# Patient Record
Sex: FEMALE | Race: WHITE | NOT HISPANIC OR LATINO | Employment: FULL TIME | URBAN - METROPOLITAN AREA
[De-identification: names, ages, dates, MRNs, and addresses within clinical notes are randomized per-mention and may not be internally consistent; named-entity substitution may affect disease eponyms.]

---

## 2017-07-11 ENCOUNTER — TELEPHONE (OUTPATIENT)
Dept: OBSTETRICS AND GYNECOLOGY | Facility: CLINIC | Age: 52
End: 2017-07-11

## 2017-07-11 NOTE — TELEPHONE ENCOUNTER
Pt said she thinks it is time for her yearly mammo. She had one done in November but that was for breast andrzej so she would like to know if  wants her to get a regular screening mammo done now.

## 2017-09-22 RX ORDER — ESTRADIOL 0.5 MG/1
TABLET ORAL
Qty: 30 TABLET | Refills: 0 | Status: SHIPPED | OUTPATIENT
Start: 2017-09-22 | End: 2017-10-24 | Stop reason: SDUPTHER

## 2017-10-24 RX ORDER — ESTRADIOL 0.5 MG/1
TABLET ORAL
Qty: 30 TABLET | Refills: 0 | Status: SHIPPED | OUTPATIENT
Start: 2017-10-24 | End: 2017-11-27 | Stop reason: SDUPTHER

## 2017-11-28 RX ORDER — ESTRADIOL 0.5 MG/1
TABLET ORAL
Qty: 30 TABLET | Refills: 0 | Status: SHIPPED | OUTPATIENT
Start: 2017-11-28 | End: 2018-01-30

## 2017-11-28 RX ORDER — ESTRADIOL 0.5 MG/1
TABLET ORAL
Qty: 30 TABLET | Refills: 0 | Status: SHIPPED | OUTPATIENT
Start: 2017-11-28 | End: 2017-11-28 | Stop reason: SDUPTHER

## 2017-11-30 ENCOUNTER — TELEPHONE (OUTPATIENT)
Dept: OBSTETRICS AND GYNECOLOGY | Facility: CLINIC | Age: 52
End: 2017-11-30

## 2017-11-30 DIAGNOSIS — Z12.31 VISIT FOR SCREENING MAMMOGRAM: Primary | ICD-10-CM

## 2017-12-14 ENCOUNTER — TELEPHONE (OUTPATIENT)
Dept: OBSTETRICS AND GYNECOLOGY | Facility: CLINIC | Age: 52
End: 2017-12-14

## 2017-12-14 DIAGNOSIS — Z12.31 VISIT FOR SCREENING MAMMOGRAM: Primary | ICD-10-CM

## 2017-12-27 RX ORDER — ESTRADIOL 0.5 MG/1
TABLET ORAL
Qty: 30 TABLET | Refills: 0 | Status: SHIPPED | OUTPATIENT
Start: 2017-12-27 | End: 2018-01-30

## 2018-01-30 ENCOUNTER — OFFICE VISIT (OUTPATIENT)
Dept: OBSTETRICS AND GYNECOLOGY | Facility: CLINIC | Age: 53
End: 2018-01-30
Payer: COMMERCIAL

## 2018-01-30 VITALS
DIASTOLIC BLOOD PRESSURE: 78 MMHG | SYSTOLIC BLOOD PRESSURE: 126 MMHG | WEIGHT: 145.5 LBS | BODY MASS INDEX: 26.78 KG/M2 | HEIGHT: 62 IN

## 2018-01-30 DIAGNOSIS — Z11.51 ENCOUNTER FOR SCREENING FOR HUMAN PAPILLOMAVIRUS (HPV): ICD-10-CM

## 2018-01-30 DIAGNOSIS — Z12.4 ENCOUNTER FOR PAPANICOLAOU SMEAR FOR CERVICAL CANCER SCREENING: ICD-10-CM

## 2018-01-30 DIAGNOSIS — Z01.419 ENCOUNTER FOR GYNECOLOGICAL EXAMINATION (GENERAL) (ROUTINE) WITHOUT ABNORMAL FINDINGS: Primary | ICD-10-CM

## 2018-01-30 PROCEDURE — 87624 HPV HI-RISK TYP POOLED RSLT: CPT

## 2018-01-30 PROCEDURE — 99396 PREV VISIT EST AGE 40-64: CPT | Mod: S$GLB,,, | Performed by: OBSTETRICS & GYNECOLOGY

## 2018-01-30 PROCEDURE — 88175 CYTOPATH C/V AUTO FLUID REDO: CPT

## 2018-01-30 PROCEDURE — 99999 PR PBB SHADOW E&M-EST. PATIENT-LVL III: CPT | Mod: PBBFAC,,, | Performed by: OBSTETRICS & GYNECOLOGY

## 2018-02-02 LAB
HPV16 AG SPEC QL: NEGATIVE
HPV16+18+H RISK 12 DNA CVX-IMP: NEGATIVE
HPV18 DNA SPEC QL NAA+PROBE: NEGATIVE

## 2018-02-10 NOTE — PROGRESS NOTES
Subjective:       Patient ID: Padmini Pollock is a 52 y.o. female.    Chief Complaint:  Well Woman (Annual Exam, last mammo  normal @ [facility]. Took herself off of Estradiol last mn due to mother having breast cancer. Now having hot flashes.)      History of Present Illness  52 year old here for annual.  Currently off hormones.  Mild hot flashes.  Concerned about vaginal dryness.  No breast concerns.  No pelvic pains.      GYN & OB History  No LMP recorded. Patient has had a hysterectomy.   Date of Last Pap: 2018    OB History    Para Term  AB Living   2 2       2   SAB TAB Ectopic Multiple Live Births           2      # Outcome Date GA Lbr Ain/2nd Weight Sex Delivery Anes PTL Lv   2 Para 08/10/00    M CS-LTranv  N MIKE   1 Para 89    F CS-LTranv  N MIKE          Past Medical History:   Diagnosis Date    Depression     Endometriosis     Family history of breast cancer in mother     Menopause     Prolapsing mitral leaflet syndrome        Past Surgical History:   Procedure Laterality Date     SECTION      x 2    HYSTERECTOMY      Outpt. facility possibly [facility]       Review of Systems  Review of Systems   Constitutional: Negative for fatigue.   Respiratory: Negative for shortness of breath.    Cardiovascular: Negative for chest pain.   Gastrointestinal: Negative for abdominal pain, constipation, diarrhea and nausea.   Genitourinary: Negative for dyspareunia, dysuria, menstrual problem and pelvic pain.        Vaginal dryness    Musculoskeletal: Negative for back pain.   Skin: Negative for rash.   Neurological: Negative for headaches.   Hematological: Negative for adenopathy.   Psychiatric/Behavioral: Negative for dysphoric mood. The patient is not nervous/anxious.         Objective:   Physical Exam:   Constitutional: She is oriented to person, place, and time. She appears well-developed and well-nourished.      Neck: No thyromegaly present.    Cardiovascular: Normal rate.      Pulmonary/Chest: Effort normal and breath sounds normal. Right breast exhibits no mass, no nipple discharge, no skin change, no tenderness and no bleeding. Left breast exhibits no mass, no nipple discharge, no skin change, no tenderness and no bleeding.        Abdominal: Soft. Normal appearance and bowel sounds are normal. She exhibits no distension and no mass. There is no tenderness. There is no rebound and no guarding.     Genitourinary: Vagina normal. Pelvic exam was performed with patient supine. There is no rash, tenderness, lesion or injury on the right labia. There is no rash, tenderness, lesion or injury on the left labia. Uterus is absent. Right adnexum displays no mass, no tenderness and no fullness. Left adnexum displays no mass, no tenderness and no fullness. No erythema, tenderness, rectocele, cystocele or unspecified prolapse of vaginal walls in the vagina. No signs of injury around the vagina. No vaginal discharge found. Cervix exhibits absence.           Musculoskeletal: Normal range of motion and moves all extremeties.      Lymphadenopathy:     She has no axillary adenopathy.        Right: No supraclavicular adenopathy present.        Left: No supraclavicular adenopathy present.    Neurological: She is alert and oriented to person, place, and time.    Skin: Skin is warm and dry.    Psychiatric: She has a normal mood and affect. Her behavior is normal. Judgment normal.        Assessment/ Plan:     Encounter for gynecological examination (general) (routine) without abnormal findings    Encounter for Papanicolaou smear for cervical cancer screening  -     Liquid-based pap smear, screening    Encounter for screening for human papillomavirus (HPV)  -     HPV High Risk Genotypes, PCR    next mammogram at [facility]      Follow-up in about 1 year (around 1/30/2019).    Patient was counseled today on A.C.S. Pap guidelines and recommendations for yearly pelvic exams, mammograms and monthly self breast exams;  to see her PCP for other health maintenance.

## 2018-06-13 ENCOUNTER — TELEPHONE (OUTPATIENT)
Dept: OBSTETRICS AND GYNECOLOGY | Facility: CLINIC | Age: 53
End: 2018-06-13

## 2018-06-13 NOTE — TELEPHONE ENCOUNTER
Pt states she has soreness in thighs near hips since the day after her annual 1/30/18.  She stopped hormones right before the appt.  It's not daily but has continued from then until now.  It's not in pelvis its more in hip but can radiate to pelvis.  She had a total hyst in 2011.  She wants to be checked, advised she probably needs to see PCP since she had hyst it probably isn't GYN related.

## 2018-12-21 ENCOUNTER — TELEPHONE (OUTPATIENT)
Dept: OBSTETRICS AND GYNECOLOGY | Facility: CLINIC | Age: 53
End: 2018-12-21

## 2018-12-21 DIAGNOSIS — Z12.31 VISIT FOR SCREENING MAMMOGRAM: Primary | ICD-10-CM

## 2019-03-12 ENCOUNTER — OFFICE VISIT (OUTPATIENT)
Dept: OBSTETRICS AND GYNECOLOGY | Facility: CLINIC | Age: 54
End: 2019-03-12
Payer: COMMERCIAL

## 2019-03-12 ENCOUNTER — LAB VISIT (OUTPATIENT)
Dept: LAB | Facility: HOSPITAL | Age: 54
End: 2019-03-12
Attending: OBSTETRICS & GYNECOLOGY
Payer: COMMERCIAL

## 2019-03-12 VITALS
HEIGHT: 62 IN | BODY MASS INDEX: 26.05 KG/M2 | SYSTOLIC BLOOD PRESSURE: 112 MMHG | DIASTOLIC BLOOD PRESSURE: 74 MMHG | WEIGHT: 141.56 LBS

## 2019-03-12 DIAGNOSIS — M89.9 DISORDER OF BONE: ICD-10-CM

## 2019-03-12 DIAGNOSIS — Z01.419 ENCOUNTER FOR GYNECOLOGICAL EXAMINATION (GENERAL) (ROUTINE) WITHOUT ABNORMAL FINDINGS: Primary | ICD-10-CM

## 2019-03-12 DIAGNOSIS — Z00.00 WELLNESS EXAMINATION: ICD-10-CM

## 2019-03-12 LAB — 25(OH)D3+25(OH)D2 SERPL-MCNC: 31 NG/ML

## 2019-03-12 PROCEDURE — 99999 PR PBB SHADOW E&M-EST. PATIENT-LVL III: ICD-10-PCS | Mod: PBBFAC,,, | Performed by: OBSTETRICS & GYNECOLOGY

## 2019-03-12 PROCEDURE — 99396 PREV VISIT EST AGE 40-64: CPT | Mod: S$GLB,,, | Performed by: OBSTETRICS & GYNECOLOGY

## 2019-03-12 PROCEDURE — 99396 PR PREVENTIVE VISIT,EST,40-64: ICD-10-PCS | Mod: S$GLB,,, | Performed by: OBSTETRICS & GYNECOLOGY

## 2019-03-12 PROCEDURE — 82306 VITAMIN D 25 HYDROXY: CPT

## 2019-03-12 PROCEDURE — 99999 PR PBB SHADOW E&M-EST. PATIENT-LVL III: CPT | Mod: PBBFAC,,, | Performed by: OBSTETRICS & GYNECOLOGY

## 2019-03-12 RX ORDER — CLOSTRIDIUM TETANI TOXOID ANTIGEN (FORMALDEHYDE INACTIVATED), CORYNEBACTERIUM DIPHTHERIAE TOXOID ANTIGEN (FORMALDEHYDE INACTIVATED), BORDETELLA PERTUSSIS TOXOID ANTIGEN (GLUTARALDEHYDE INACTIVATED), BORDETELLA PERTUSSIS FILAMENTOUS HEMAGGLUTININ ANTIGEN (FORMALDEHYDE INACTIVATED), BORDETELLA PERTUSSIS PERTACTIN ANTIGEN, AND BORDETELLA PERTUSSIS FIMBRIAE 2/3 ANTIGEN 5; 2; 2.5; 5; 3; 5 [LF]/.5ML; [LF]/.5ML; UG/.5ML; UG/.5ML; UG/.5ML; UG/.5ML
INJECTION, SUSPENSION INTRAMUSCULAR
COMMUNITY
Start: 2019-01-26 | End: 2020-09-08

## 2019-03-12 RX ORDER — PROGESTERONE 100 MG/1
100 CAPSULE ORAL NIGHTLY
Qty: 30 CAPSULE | Refills: 11 | Status: SHIPPED | OUTPATIENT
Start: 2019-03-12 | End: 2020-09-08

## 2019-03-12 RX ORDER — PRAVASTATIN SODIUM 20 MG/1
20 TABLET ORAL DAILY
COMMUNITY
End: 2021-12-27 | Stop reason: SDUPTHER

## 2019-03-12 RX ORDER — ESTRADIOL 1 MG/1
1 TABLET ORAL DAILY
Qty: 30 TABLET | Refills: 11 | Status: SHIPPED | OUTPATIENT
Start: 2019-03-12 | End: 2019-04-01 | Stop reason: SDUPTHER

## 2019-03-12 NOTE — PROGRESS NOTES
Subjective:       Patient ID: Padmini Pollock is a 53 y.o. female.    Chief Complaint:  Well Woman (Annual Exam  --  last pap/hpv 18, Neg (Supracervical Hyst)  --  last mmg 18, Birads 2  --  colonoscopy 2016, Normal )      History of Present Illness  53 year old here for annual.  Reports worsening hot flashes and night sweats.  Feeling mood changes and irritation.  Feeling more fatigued.  No vaginal bleeding or pelvic pain.  No breast pain.  Last mammogram normal.  Interested in possible genetic counseling and will do next mammogram here.  Discussed bone density.  Interested in starting hormones and is considering pellets.      GYN & OB History  No LMP recorded. Patient has had a hysterectomy.   Date of Last Pap: 2018    OB History    Para Term  AB Living   2 2 2     2   SAB TAB Ectopic Multiple Live Births           2      # Outcome Date GA Lbr Ian/2nd Weight Sex Delivery Anes PTL Lv   2 Term 08/10/00 39w0d   M CS-LTranv Spinal N MIKE      Complications: Previous  section   1 Term 89 39w0d   F CS-LTranv Spinal N MIKE          Past Medical History:   Diagnosis Date    Depression     Endometriosis     Family history of breast cancer in mother     Hx of migraine headaches     Hyperlipidemia     Menopause     Prolapsing mitral leaflet syndrome        Past Surgical History:   Procedure Laterality Date     SECTION      x 2    COLONOSCOPY  2016    Normal     HYSTERECTOMY  2011    Supra Cervical w/ Dr Jamie Mayorga (Outpt. facility possibly [facility])       Review of Systems  Review of Systems   Constitutional: Positive for fatigue.   Respiratory: Negative for shortness of breath.    Cardiovascular: Negative for chest pain.   Gastrointestinal: Negative for abdominal pain, constipation, diarrhea and nausea.   Endocrine: Positive for heat intolerance.   Genitourinary: Negative for dyspareunia, dysuria, menstrual problem, pelvic pain and vaginal bleeding.    Musculoskeletal: Negative for back pain.   Skin: Negative for rash.   Neurological: Negative for headaches.   Hematological: Negative for adenopathy.   Psychiatric/Behavioral: Positive for dysphoric mood. The patient is not nervous/anxious.         Objective:   Physical Exam:   Constitutional: She is oriented to person, place, and time. She appears well-developed and well-nourished.      Neck: No thyromegaly present.    Cardiovascular: Normal rate.     Pulmonary/Chest: Effort normal and breath sounds normal. Right breast exhibits no mass, no nipple discharge, no skin change, no tenderness and no bleeding. Left breast exhibits no mass, no nipple discharge, no skin change, no tenderness and no bleeding.        Abdominal: Soft. Normal appearance and bowel sounds are normal. She exhibits no distension and no mass. There is no tenderness. There is no rebound and no guarding.     Genitourinary: Vagina normal. Pelvic exam was performed with patient supine. There is no rash, tenderness, lesion or injury on the right labia. There is no rash, tenderness, lesion or injury on the left labia. Uterus is absent. Cervix is normal. Right adnexum displays no mass, no tenderness and no fullness. Left adnexum displays no mass, no tenderness and no fullness. No erythema, tenderness, rectocele, cystocele or unspecified prolapse of vaginal walls in the vagina. No signs of injury around the vagina. No vaginal discharge found. Cervix exhibits no motion tenderness, no discharge and no friability.   Genitourinary Comments: Vulvar dryness            Musculoskeletal: Normal range of motion and moves all extremeties.      Lymphadenopathy:     She has no axillary adenopathy.        Right: No supraclavicular adenopathy present.        Left: No supraclavicular adenopathy present.    Neurological: She is alert and oriented to person, place, and time.    Skin: Skin is warm and dry.    Psychiatric: She has a normal mood and affect. Her behavior is  normal. Judgment normal.        Assessment/ Plan:     Encounter for gynecological examination (general) (routine) without abnormal findings    Wellness examination  -     Vitamin D; Future; Expected date: 03/12/2019    Disorder of bone  -     DXA Bone Density Spine And Hip; Future; Expected date: 03/12/2019    Other orders  -     estradiol (ESTRACE) 1 MG tablet; Take 1 tablet (1 mg total) by mouth once daily.  Dispense: 30 tablet; Refill: 11  -     progesterone (PROMETRIUM) 100 MG capsule; Take 1 capsule (100 mg total) by mouth nightly.  Dispense: 30 capsule; Refill: 11         Follow-up in about 3 months (around 6/12/2019).    Patient was counseled today on A.C.S. Pap guidelines and recommendations for yearly pelvic exams, mammograms and monthly self breast exams; to see her PCP for other health maintenance.

## 2019-03-14 ENCOUNTER — TELEPHONE (OUTPATIENT)
Dept: OBSTETRICS AND GYNECOLOGY | Facility: CLINIC | Age: 54
End: 2019-03-14

## 2019-03-14 NOTE — TELEPHONE ENCOUNTER
Pt notified of normal vitamin d levels and instructed to take 1000 IUs daily per Dr. Ndiaye, pt verbalized understanding.

## 2019-03-15 ENCOUNTER — TELEPHONE (OUTPATIENT)
Dept: OBSTETRICS AND GYNECOLOGY | Facility: CLINIC | Age: 54
End: 2019-03-15

## 2019-03-15 NOTE — TELEPHONE ENCOUNTER
----- Message from Connie Ndiaye MD sent at 3/13/2019 12:45 PM CDT -----  Please take vitamin d as the level is just in the normal range.  1000 iu a day Dr. Ndiaye

## 2019-04-02 RX ORDER — ESTRADIOL 1 MG/1
1 TABLET ORAL DAILY
Qty: 90 TABLET | Refills: 3 | Status: SHIPPED | OUTPATIENT
Start: 2019-04-02 | End: 2020-01-30 | Stop reason: SDUPTHER

## 2019-05-06 ENCOUNTER — APPOINTMENT (OUTPATIENT)
Dept: RADIOLOGY | Facility: CLINIC | Age: 54
End: 2019-05-06
Attending: OBSTETRICS & GYNECOLOGY
Payer: COMMERCIAL

## 2019-05-06 DIAGNOSIS — M89.9 DISORDER OF BONE: ICD-10-CM

## 2019-05-06 PROCEDURE — 77080 DXA BONE DENSITY AXIAL: CPT | Mod: 26,,, | Performed by: INTERNAL MEDICINE

## 2019-05-06 PROCEDURE — 77080 DEXA BONE DENSITY SPINE HIP: ICD-10-PCS | Mod: 26,,, | Performed by: INTERNAL MEDICINE

## 2019-05-06 PROCEDURE — 77080 DXA BONE DENSITY AXIAL: CPT | Mod: TC,PO

## 2019-06-26 RX ORDER — ESTRADIOL 1 MG/1
1 TABLET ORAL DAILY
Qty: 90 TABLET | Refills: 3 | OUTPATIENT
Start: 2019-06-26 | End: 2020-06-25

## 2019-12-17 ENCOUNTER — TELEPHONE (OUTPATIENT)
Dept: OBSTETRICS AND GYNECOLOGY | Facility: CLINIC | Age: 54
End: 2019-12-17

## 2019-12-17 DIAGNOSIS — Z12.31 VISIT FOR SCREENING MAMMOGRAM: Primary | ICD-10-CM

## 2019-12-17 NOTE — TELEPHONE ENCOUNTER
Dr. Ndiaye-- pt requesting mammo orders be faxed to [facility] [address]. Pt would like to be called once sent. Pt's #  [phone]

## 2020-01-31 RX ORDER — ESTRADIOL 1 MG/1
1 TABLET ORAL DAILY
Qty: 90 TABLET | Refills: 0 | Status: SHIPPED | OUTPATIENT
Start: 2020-01-31 | End: 2020-05-10 | Stop reason: SDUPTHER

## 2020-05-10 RX ORDER — ESTRADIOL 1 MG/1
TABLET ORAL
Qty: 90 TABLET | Refills: 0 | Status: SHIPPED | OUTPATIENT
Start: 2020-05-10 | End: 2020-08-04

## 2020-07-06 ENCOUNTER — TELEPHONE (OUTPATIENT)
Dept: OBSTETRICS AND GYNECOLOGY | Facility: CLINIC | Age: 55
End: 2020-07-06

## 2020-07-06 RX ORDER — ESCITALOPRAM OXALATE 10 MG/1
10 TABLET ORAL DAILY
Qty: 30 TABLET | Refills: 2 | Status: SHIPPED | OUTPATIENT
Start: 2020-07-06 | End: 2021-06-30

## 2020-09-08 ENCOUNTER — OFFICE VISIT (OUTPATIENT)
Dept: OBSTETRICS AND GYNECOLOGY | Facility: CLINIC | Age: 55
End: 2020-09-08
Attending: OBSTETRICS & GYNECOLOGY
Payer: COMMERCIAL

## 2020-09-08 VITALS
SYSTOLIC BLOOD PRESSURE: 120 MMHG | DIASTOLIC BLOOD PRESSURE: 80 MMHG | WEIGHT: 143.5 LBS | BODY MASS INDEX: 26.41 KG/M2 | HEIGHT: 62 IN

## 2020-09-08 DIAGNOSIS — Z12.31 VISIT FOR SCREENING MAMMOGRAM: ICD-10-CM

## 2020-09-08 DIAGNOSIS — Z01.419 ENCOUNTER FOR GYNECOLOGICAL EXAMINATION (GENERAL) (ROUTINE) WITHOUT ABNORMAL FINDINGS: Primary | ICD-10-CM

## 2020-09-08 PROCEDURE — 99396 PREV VISIT EST AGE 40-64: CPT | Mod: S$GLB,,, | Performed by: OBSTETRICS & GYNECOLOGY

## 2020-09-08 PROCEDURE — 99999 PR PBB SHADOW E&M-EST. PATIENT-LVL III: ICD-10-PCS | Mod: PBBFAC,,, | Performed by: OBSTETRICS & GYNECOLOGY

## 2020-09-08 PROCEDURE — 99999 PR PBB SHADOW E&M-EST. PATIENT-LVL III: CPT | Mod: PBBFAC,,, | Performed by: OBSTETRICS & GYNECOLOGY

## 2020-09-08 PROCEDURE — 99396 PR PREVENTIVE VISIT,EST,40-64: ICD-10-PCS | Mod: S$GLB,,, | Performed by: OBSTETRICS & GYNECOLOGY

## 2020-09-08 RX ORDER — PROGESTERONE 100 MG/1
100 CAPSULE ORAL NIGHTLY
Qty: 30 CAPSULE | Refills: 11 | Status: SHIPPED | OUTPATIENT
Start: 2020-09-08 | End: 2021-06-30

## 2020-09-14 NOTE — PROGRESS NOTES
Subjective:       Patient ID: Padmini Pollock is a 55 y.o. female.    Chief Complaint:  Well Woman (18-pap/hpv-negative/negative 1933-edepv-WDL -negative )      History of Present Illness  55 year old here for annual.  Discussed hormones and currently happy with present dose.  Will not change at this time.  Patient s/p hysterectomy and is having no vaginal bleeding or pelvic pain.  Pap utd and mmg at end of the year.  Recently started lexapro and moods are stable.  Discussed changes in her life and de stressing techniques to help her at home.    Menopausal symptoms controlled     GYN & OB History  No LMP recorded. Patient has had a hysterectomy.   Date of Last Pap: 2018    OB History    Para Term  AB Living   2 2 2     2   SAB TAB Ectopic Multiple Live Births           2      # Outcome Date GA Lbr Ian/2nd Weight Sex Delivery Anes PTL Lv   2 Term 08/10/00 39w0d   M CS-LTranv Spinal N MIKE      Complications: Previous  section   1 Term 89 39w0d   F CS-LTranv Spinal N MIKE       Past Medical History:   Diagnosis Date    Depression     Endometriosis     Family history of breast cancer in mother     Hx of migraine headaches     Hyperlipidemia     Menopause     Prolapsing mitral leaflet syndrome        Past Surgical History:   Procedure Laterality Date     SECTION      x 2    COLONOSCOPY  2016    Normal     HYSTERECTOMY  2011    Supra Cervical w/ Dr Jamie Mayorga (Outpt. facility possibly [facility])       Review of Systems  Review of Systems   Constitutional: Negative for fatigue.   Respiratory: Negative for shortness of breath.    Cardiovascular: Negative for chest pain.   Gastrointestinal: Negative for abdominal pain, constipation, diarrhea and nausea.   Genitourinary: Negative for dyspareunia, dysuria, menstrual problem, pelvic pain and vaginal bleeding.   Skin: Negative for rash.   Neurological: Negative for headaches.   Hematological: Negative for adenopathy.    Psychiatric/Behavioral: Positive for dysphoric mood and sleep disturbance. The patient is nervous/anxious.         Objective:   Physical Exam:   Constitutional: She is oriented to person, place, and time. She appears well-developed and well-nourished.      Neck: No thyromegaly present.     Pulmonary/Chest: Effort normal. Right breast exhibits no mass, no nipple discharge, no skin change, no tenderness and no bleeding. Left breast exhibits no mass, no nipple discharge, no skin change, no tenderness and no bleeding.        Abdominal: Soft. Normal appearance and bowel sounds are normal. She exhibits no distension and no mass. There is no abdominal tenderness. There is no rebound and no guarding.     Genitourinary:    Vagina normal.      Pelvic exam was performed with patient supine.   There is no rash, tenderness, lesion or injury on the right labia. There is no rash, tenderness, lesion or injury on the left labia. Uterus is absent. Cervix is normal. Right adnexum displays no mass, no tenderness and no fullness. Left adnexum displays no mass, no tenderness and no fullness. No erythema, tenderness, rectocele, cystocele or unspecified prolapse of vaginal walls in the vagina.    No signs of injury in the vagina.   negative for vaginal discharge          Musculoskeletal: Normal range of motion and moves all extremeties.      Lymphadenopathy:        Right: No supraclavicular adenopathy present.        Left: No supraclavicular adenopathy present.    Neurological: She is alert and oriented to person, place, and time.    Skin: Skin is warm and dry.    Psychiatric: She has a normal mood and affect. Her behavior is normal. Judgment normal.        Assessment/ Plan:     Encounter for gynecological examination (general) (routine) without abnormal findings    Visit for screening mammogram  -     Mammo Digital Screening Martha w/ [facility]; Future; Expected date: 09/08/2020    Other orders  -     progesterone (PROMETRIUM) 100 MG capsule;  Take 1 capsule (100 mg total) by mouth nightly.  Dispense: 30 capsule; Refill: 11         No follow-ups on file.    Patient was counseled today on A.C.S. Pap guidelines and recommendations for yearly pelvic exams, mammograms and monthly self breast exams; to see her PCP for other health maintenance.

## 2020-11-03 RX ORDER — ESTRADIOL 1 MG/1
1 TABLET ORAL DAILY
Qty: 90 TABLET | Refills: 3 | Status: SHIPPED | OUTPATIENT
Start: 2020-11-03 | End: 2021-10-26 | Stop reason: SDUPTHER

## 2020-12-10 ENCOUNTER — CLINICAL SUPPORT (OUTPATIENT)
Dept: URGENT CARE | Facility: CLINIC | Age: 55
End: 2020-12-10
Payer: COMMERCIAL

## 2020-12-10 DIAGNOSIS — U07.1 COVID-19: Primary | ICD-10-CM

## 2020-12-10 DIAGNOSIS — U07.1 COVID-19 VIRUS DETECTED: ICD-10-CM

## 2020-12-10 LAB
CTP QC/QA: YES
SARS-COV-2 RDRP RESP QL NAA+PROBE: POSITIVE

## 2020-12-10 PROCEDURE — U0002 COVID-19 LAB TEST NON-CDC: HCPCS | Mod: QW,S$GLB,, | Performed by: PHYSICIAN ASSISTANT

## 2020-12-10 PROCEDURE — U0002: ICD-10-PCS | Mod: QW,S$GLB,, | Performed by: PHYSICIAN ASSISTANT

## 2020-12-10 NOTE — LETTER
[address], [address] [address]  Phone: [phone]  Fax: [phone]          Return to Work/School    Patient: Padmini Pollock  YOB: 1965   Date: 12/10/2020     To Whom It May Concern:     Padmini Pollock was in contact with/seen in my office on 12/10/2020. COVID-19 is present in our communities across the state. There is limited testing for COVID at this time, so not all patients can be tested. In this situation, your employee meets the following criteria:     Padmini Pollock has met the criteria for COVID-19 testing and has a POSITIVE result. She can return to work once they are asymptomatic for 24 hours without the use of fever reducing medications AND at least ten days from the start of symptoms (or from the first positive result if they have no symptoms).      If you have any questions or concerns, or if I can be of further assistance, please do not hesitate to contact me.     Sincerely,    NURSE URGENT CARE, [facility] [place]

## 2020-12-14 ENCOUNTER — NURSE TRIAGE (OUTPATIENT)
Dept: ADMINISTRATIVE | Facility: CLINIC | Age: 55
End: 2020-12-14

## 2020-12-14 NOTE — TELEPHONE ENCOUNTER
Day 8 symptoms     Reason for Disposition   [1] COVID-19 diagnosed by positive lab test AND [2] mild symptoms (e.g., cough, fever, others) AND [3] no complications or SOB    Additional Information   Negative: Severe difficulty breathing (e.g., struggling for each breath, speaks in single words)   Negative: Difficult to awaken or acting confused (e.g., disoriented, slurred speech)   Negative: Bluish (or gray) lips or face now   Negative: Shock suspected (e.g., cold/pale/clammy skin, too weak to stand, low BP, rapid pulse)   Negative: Sounds like a life-threatening emergency to the triager   Negative: [1] COVID-19 exposure AND [2] no symptoms   Negative: COVID-19 and Breastfeeding, questions about   Negative: [1] Adult with possible COVID-19 symptoms AND [2] triager concerned about severity of symptoms or other causes   Negative: SEVERE or constant chest pain or pressure (Exception: mild central chest pain, present only when coughing)   Negative: MODERATE difficulty breathing (e.g., speaks in phrases, SOB even at rest, pulse 100-120)   Negative: MILD difficulty breathing (e.g., minimal/no SOB at rest, SOB with walking, pulse <100)   Negative: Chest pain   Negative: Patient sounds very sick or weak to the triager   Negative: Fever > 103 F (39.4 C)   Negative: [1] Fever > 101 F (38.3 C) AND [2] age > 60   Negative: [1] Fever > 100.0 F (37.8 C) AND [2] bedridden (e.g., nursing home patient, CVA, chronic illness, recovering from surgery)   Negative: HIGH RISK patient (e.g., age > 64 years, diabetes, heart or lung disease, weak immune system) (Exception: has already been evaluated by healthcare provider and has no new or worsening symptoms)   Negative: [1] COVID-19 infection suspected by caller or triager AND [2] mild symptoms (cough, fever, or others) AND [3] no complications or SOB   Negative: Fever present > 3 days (72 hours)   Negative: [1] Fever returns after gone for over 24 hours AND [2]  symptoms worse or not improved   Negative: [1] Continuous (nonstop) coughing interferes with work or school AND [2] no improvement using cough treatment per protocol   Negative: Cough present > 3 weeks    Protocols used: CORONAVIRUS (COVID-19) DIAGNOSED OR [id number]

## 2021-01-14 ENCOUNTER — TELEPHONE (OUTPATIENT)
Dept: OBSTETRICS AND GYNECOLOGY | Facility: CLINIC | Age: 56
End: 2021-01-14

## 2021-01-14 DIAGNOSIS — Z12.31 VISIT FOR SCREENING MAMMOGRAM: Primary | ICD-10-CM

## 2021-02-23 ENCOUNTER — PATIENT MESSAGE (OUTPATIENT)
Dept: OBSTETRICS AND GYNECOLOGY | Facility: CLINIC | Age: 56
End: 2021-02-23

## 2021-02-23 ENCOUNTER — TELEPHONE (OUTPATIENT)
Dept: OBSTETRICS AND GYNECOLOGY | Facility: CLINIC | Age: 56
End: 2021-02-23

## 2021-02-23 DIAGNOSIS — R92.8 ABNORMAL MAMMOGRAM: Primary | ICD-10-CM

## 2021-04-16 ENCOUNTER — PATIENT MESSAGE (OUTPATIENT)
Dept: RESEARCH | Facility: HOSPITAL | Age: 56
End: 2021-04-16

## 2021-06-30 ENCOUNTER — OFFICE VISIT (OUTPATIENT)
Dept: PRIMARY CARE CLINIC | Facility: CLINIC | Age: 56
End: 2021-06-30
Payer: COMMERCIAL

## 2021-06-30 ENCOUNTER — PATIENT OUTREACH (OUTPATIENT)
Dept: ADMINISTRATIVE | Facility: HOSPITAL | Age: 56
End: 2021-06-30

## 2021-06-30 VITALS
HEART RATE: 68 BPM | RESPIRATION RATE: 20 BRPM | DIASTOLIC BLOOD PRESSURE: 96 MMHG | OXYGEN SATURATION: 98 % | HEIGHT: 62 IN | SYSTOLIC BLOOD PRESSURE: 140 MMHG | WEIGHT: 149.5 LBS | BODY MASS INDEX: 27.51 KG/M2 | TEMPERATURE: 98 F

## 2021-06-30 DIAGNOSIS — K76.89 LIVER CYST: ICD-10-CM

## 2021-06-30 DIAGNOSIS — E78.5 HYPERLIPIDEMIA, UNSPECIFIED HYPERLIPIDEMIA TYPE: ICD-10-CM

## 2021-06-30 DIAGNOSIS — I10 HYPERTENSION, UNSPECIFIED TYPE: ICD-10-CM

## 2021-06-30 DIAGNOSIS — Z11.4 ENCOUNTER FOR SCREENING FOR HIV: ICD-10-CM

## 2021-06-30 DIAGNOSIS — K21.9 GASTROESOPHAGEAL REFLUX DISEASE, UNSPECIFIED WHETHER ESOPHAGITIS PRESENT: ICD-10-CM

## 2021-06-30 DIAGNOSIS — Z00.00 NORMAL PHYSICAL EXAM, ROUTINE: Primary | ICD-10-CM

## 2021-06-30 DIAGNOSIS — N28.1 SIMPLE RENAL CYST: ICD-10-CM

## 2021-06-30 DIAGNOSIS — Z11.59 NEED FOR HEPATITIS C SCREENING TEST: ICD-10-CM

## 2021-06-30 DIAGNOSIS — J45.991 COUGH VARIANT ASTHMA: ICD-10-CM

## 2021-06-30 PROCEDURE — 99386 PR PREVENTIVE VISIT,NEW,40-64: ICD-10-PCS | Mod: S$GLB,,, | Performed by: INTERNAL MEDICINE

## 2021-06-30 PROCEDURE — 99999 PR PBB SHADOW E&M-EST. PATIENT-LVL IV: ICD-10-PCS | Mod: PBBFAC,,, | Performed by: INTERNAL MEDICINE

## 2021-06-30 PROCEDURE — 99386 PREV VISIT NEW AGE 40-64: CPT | Mod: S$GLB,,, | Performed by: INTERNAL MEDICINE

## 2021-06-30 PROCEDURE — 99999 PR PBB SHADOW E&M-EST. PATIENT-LVL IV: CPT | Mod: PBBFAC,,, | Performed by: INTERNAL MEDICINE

## 2021-07-01 ENCOUNTER — PATIENT OUTREACH (OUTPATIENT)
Dept: ADMINISTRATIVE | Facility: HOSPITAL | Age: 56
End: 2021-07-01

## 2021-10-04 PROBLEM — Z00.00 NORMAL PHYSICAL EXAM, ROUTINE: Status: RESOLVED | Noted: 2021-06-30 | Resolved: 2021-10-04

## 2021-11-02 ENCOUNTER — TELEPHONE (OUTPATIENT)
Dept: PRIMARY CARE CLINIC | Facility: CLINIC | Age: 56
End: 2021-11-02
Payer: COMMERCIAL

## 2021-11-02 ENCOUNTER — OFFICE VISIT (OUTPATIENT)
Dept: INTERNAL MEDICINE | Facility: CLINIC | Age: 56
End: 2021-11-02
Payer: COMMERCIAL

## 2021-11-02 VITALS
SYSTOLIC BLOOD PRESSURE: 156 MMHG | HEART RATE: 81 BPM | OXYGEN SATURATION: 98 % | HEIGHT: 62 IN | DIASTOLIC BLOOD PRESSURE: 94 MMHG | BODY MASS INDEX: 27.05 KG/M2 | WEIGHT: 147 LBS

## 2021-11-02 DIAGNOSIS — Z11.4 ENCOUNTER FOR SCREENING FOR HIV: ICD-10-CM

## 2021-11-02 DIAGNOSIS — G43.809 OTHER MIGRAINE WITHOUT STATUS MIGRAINOSUS, NOT INTRACTABLE: ICD-10-CM

## 2021-11-02 DIAGNOSIS — Z11.59 NEED FOR HEPATITIS C SCREENING TEST: ICD-10-CM

## 2021-11-02 DIAGNOSIS — E78.5 HYPERLIPIDEMIA, UNSPECIFIED HYPERLIPIDEMIA TYPE: ICD-10-CM

## 2021-11-02 DIAGNOSIS — I10 HYPERTENSION, UNSPECIFIED TYPE: Primary | ICD-10-CM

## 2021-11-02 PROCEDURE — 99999 PR PBB SHADOW E&M-EST. PATIENT-LVL III: ICD-10-PCS | Mod: PBBFAC,,, | Performed by: INTERNAL MEDICINE

## 2021-11-02 PROCEDURE — 99214 OFFICE O/P EST MOD 30 MIN: CPT | Mod: S$GLB,,, | Performed by: INTERNAL MEDICINE

## 2021-11-02 PROCEDURE — 99999 PR PBB SHADOW E&M-EST. PATIENT-LVL III: CPT | Mod: PBBFAC,,, | Performed by: INTERNAL MEDICINE

## 2021-11-02 PROCEDURE — 99214 PR OFFICE/OUTPT VISIT, EST, LEVL IV, 30-39 MIN: ICD-10-PCS | Mod: S$GLB,,, | Performed by: INTERNAL MEDICINE

## 2021-11-02 RX ORDER — ATENOLOL 25 MG/1
25 TABLET ORAL 2 TIMES DAILY
Qty: 60 TABLET | Refills: 1 | Status: SHIPPED | OUTPATIENT
Start: 2021-11-02 | End: 2021-12-23

## 2021-11-04 ENCOUNTER — TELEPHONE (OUTPATIENT)
Dept: PRIMARY CARE CLINIC | Facility: CLINIC | Age: 56
End: 2021-11-04
Payer: COMMERCIAL

## 2021-11-04 DIAGNOSIS — I10 HYPERTENSION, UNSPECIFIED TYPE: Primary | ICD-10-CM

## 2021-11-04 LAB
ALBUMIN: 4 GRAM/DL (ref 3.5–5)
ALP SERPL-CCNC: 121 UNIT/L (ref 38–126)
ALT SERPL W P-5'-P-CCNC: 58 UNIT/L (ref 7–56)
ANION GAP SERPL CALC-SCNC: 17 MEQ/L (ref 9–18)
AST SERPL-CCNC: 56 UNIT/L (ref 7–40)
BASOPHILS ABSOLUTE COUNT: 0 K/UL (ref 0–0.2)
BASOPHILS NFR BLD: 0.6 % (ref 0–2)
BILIRUB SERPL-MCNC: 0.3 MG/DL (ref 0–1.2)
BUN BLD-MCNC: 10 MG/DL (ref 7–21)
BUN/CREAT SERPL: 12 RATIO (ref 6–22)
CALC OSMOLALITY: 282 MOSM/KG (ref 275–295)
CALCIUM SERPL-MCNC: 9.4 MG/DL (ref 8.5–10.4)
CHLORIDE SERPL-SCNC: 103 MEQ/L (ref 98–107)
CHOL/HDLC RATIO: 4
CHOLEST SERPL-MSCNC: 203 MG/DL (ref 100–200)
CO2 SERPL-SCNC: 26 MEQ/L (ref 21–31)
CREAT SERPL-MCNC: 0.8 MG/DL (ref 0.5–1)
DIFFERENTIAL TYPE: NORMAL
EOSINOPHIL NFR BLD: 1.2 % (ref 0–4)
EOSINOPHILS ABSOLUTE COUNT: 0.1 K/UL (ref 0–0.7)
ERYTHROCYTE [DISTWIDTH] IN BLOOD BY AUTOMATED COUNT: 12.8 % (ref 12–15.3)
GFR: 78.7 ML/MIN/1.73M2
GLUCOSE SERPL-MCNC: 92 MG/DL (ref 70–100)
HCT VFR BLD AUTO: 40.6 % (ref 37–47)
HCV AB S/CO SERPL IA: 0.01
HCV AB SERPL QL IA: NORMAL
HDLC SERPL-MCNC: 50 MG/DL (ref 40–75)
HGB BLD-MCNC: 13.8 GRAM/DL (ref 12–16)
HIV 1+2 AB+HIV1 P24 AG SERPL QL IA: NORMAL
LDLC SERPL CALC-MCNC: 130 MG/DL (ref 0–125)
LYMPHOCYTES %: 26 % (ref 15–45)
LYMPHOCYTES ABSOLUTE COUNT: 1.8 K/UL (ref 1–4.2)
MCH RBC QN AUTO: 32.2 PICOGRAM (ref 27–33)
MCHC RBC AUTO-ENTMCNC: 33.9 GRAM/DL (ref 32–36)
MCV RBC AUTO: 95.1 FEMTOLITER (ref 81–99)
MONOCYTES %: 6.4 % (ref 3–13)
MONOCYTES ABSOLUTE COUNT: 0.5 K/UL (ref 0.1–0.8)
NEUTROPHILS ABSOLUTE COUNT: 4.7 K/UL (ref 2.1–7.6)
NEUTROPHILS RELATIVE PERCENT: 65.8 % (ref 32–80)
NONHDLC SERPL-MCNC: 153 MG/DL (ref 60–125)
PLATELET # BLD AUTO: 337 K/UL (ref 150–350)
PMV BLD AUTO: 8.8 FEMTOLITER (ref 7–10.2)
POTASSIUM SERPL-SCNC: 4.3 MEQ/L (ref 3.5–5)
RBC # BLD AUTO: 4.27 MIL/UL (ref 4.2–5.4)
SODIUM BLD-SCNC: 142 MEQ/L (ref 135–145)
TOTAL PROTEIN: 7 GRAM/DL (ref 6.3–8.2)
TRIGL SERPL-MCNC: 142 MG/DL (ref 30–150)
TSH SERPL DL<=0.005 MIU/L-ACNC: 1.9 UIL/ML (ref 0.35–4)
WBC # BLD AUTO: 7.1 K/UL (ref 4.5–11)

## 2021-11-04 RX ORDER — LOSARTAN POTASSIUM 25 MG/1
25 TABLET ORAL DAILY
Qty: 90 TABLET | Refills: 0 | Status: SHIPPED | OUTPATIENT
Start: 2021-11-04 | End: 2021-12-27 | Stop reason: SDUPTHER

## 2021-11-04 RX ORDER — LISINOPRIL 10 MG/1
10 TABLET ORAL DAILY
Qty: 90 TABLET | Refills: 0 | Status: SHIPPED | OUTPATIENT
Start: 2021-11-04 | End: 2021-11-04

## 2021-12-27 ENCOUNTER — OFFICE VISIT (OUTPATIENT)
Dept: PRIMARY CARE CLINIC | Facility: CLINIC | Age: 56
End: 2021-12-27
Payer: COMMERCIAL

## 2021-12-27 VITALS
SYSTOLIC BLOOD PRESSURE: 121 MMHG | DIASTOLIC BLOOD PRESSURE: 72 MMHG | TEMPERATURE: 99 F | WEIGHT: 137.13 LBS | RESPIRATION RATE: 18 BRPM | HEART RATE: 90 BPM | HEIGHT: 62 IN | OXYGEN SATURATION: 97 % | BODY MASS INDEX: 25.23 KG/M2

## 2021-12-27 DIAGNOSIS — R05.9 COUGH: ICD-10-CM

## 2021-12-27 DIAGNOSIS — J06.9 ACUTE UPPER RESPIRATORY INFECTION: Primary | ICD-10-CM

## 2021-12-27 DIAGNOSIS — I10 HYPERTENSION, UNSPECIFIED TYPE: ICD-10-CM

## 2021-12-27 DIAGNOSIS — E78.5 HYPERLIPIDEMIA, UNSPECIFIED HYPERLIPIDEMIA TYPE: ICD-10-CM

## 2021-12-27 DIAGNOSIS — K63.5 POLYP OF COLON, UNSPECIFIED PART OF COLON, UNSPECIFIED TYPE: ICD-10-CM

## 2021-12-27 PROCEDURE — 1159F MED LIST DOCD IN RCRD: CPT | Mod: CPTII,S$GLB,, | Performed by: INTERNAL MEDICINE

## 2021-12-27 PROCEDURE — U0003 INFECTIOUS AGENT DETECTION BY NUCLEIC ACID (DNA OR RNA); SEVERE ACUTE RESPIRATORY SYNDROME CORONAVIRUS 2 (SARS-COV-2) (CORONAVIRUS DISEASE [COVID-19]), AMPLIFIED PROBE TECHNIQUE, MAKING USE OF HIGH THROUGHPUT TECHNOLOGIES AS DESCRIBED BY CMS-2020-01-R: HCPCS | Performed by: INTERNAL MEDICINE

## 2021-12-27 PROCEDURE — 4010F PR ACE/ARB THEARPY RXD/TAKEN: ICD-10-PCS | Mod: CPTII,S$GLB,, | Performed by: INTERNAL MEDICINE

## 2021-12-27 PROCEDURE — 99999 PR PBB SHADOW E&M-EST. PATIENT-LVL III: CPT | Mod: PBBFAC,,, | Performed by: INTERNAL MEDICINE

## 2021-12-27 PROCEDURE — 1159F PR MEDICATION LIST DOCUMENTED IN MEDICAL RECORD: ICD-10-PCS | Mod: CPTII,S$GLB,, | Performed by: INTERNAL MEDICINE

## 2021-12-27 PROCEDURE — 3074F PR MOST RECENT SYSTOLIC BLOOD PRESSURE < 130 MM HG: ICD-10-PCS | Mod: CPTII,S$GLB,, | Performed by: INTERNAL MEDICINE

## 2021-12-27 PROCEDURE — 99999 PR PBB SHADOW E&M-EST. PATIENT-LVL III: ICD-10-PCS | Mod: PBBFAC,,, | Performed by: INTERNAL MEDICINE

## 2021-12-27 PROCEDURE — 99214 OFFICE O/P EST MOD 30 MIN: CPT | Mod: S$GLB,,, | Performed by: INTERNAL MEDICINE

## 2021-12-27 PROCEDURE — 3078F DIAST BP <80 MM HG: CPT | Mod: CPTII,S$GLB,, | Performed by: INTERNAL MEDICINE

## 2021-12-27 PROCEDURE — 99214 PR OFFICE/OUTPT VISIT, EST, LEVL IV, 30-39 MIN: ICD-10-PCS | Mod: S$GLB,,, | Performed by: INTERNAL MEDICINE

## 2021-12-27 PROCEDURE — 3074F SYST BP LT 130 MM HG: CPT | Mod: CPTII,S$GLB,, | Performed by: INTERNAL MEDICINE

## 2021-12-27 PROCEDURE — 3008F BODY MASS INDEX DOCD: CPT | Mod: CPTII,S$GLB,, | Performed by: INTERNAL MEDICINE

## 2021-12-27 PROCEDURE — 4010F ACE/ARB THERAPY RXD/TAKEN: CPT | Mod: CPTII,S$GLB,, | Performed by: INTERNAL MEDICINE

## 2021-12-27 PROCEDURE — 3078F PR MOST RECENT DIASTOLIC BLOOD PRESSURE < 80 MM HG: ICD-10-PCS | Mod: CPTII,S$GLB,, | Performed by: INTERNAL MEDICINE

## 2021-12-27 PROCEDURE — 3008F PR BODY MASS INDEX (BMI) DOCUMENTED: ICD-10-PCS | Mod: CPTII,S$GLB,, | Performed by: INTERNAL MEDICINE

## 2021-12-27 PROCEDURE — U0005 INFEC AGEN DETEC AMPLI PROBE: HCPCS | Performed by: INTERNAL MEDICINE

## 2021-12-27 RX ORDER — ATENOLOL 25 MG/1
25 TABLET ORAL DAILY
Qty: 90 TABLET | Refills: 1 | Status: SHIPPED | OUTPATIENT
Start: 2021-12-27 | End: 2022-03-28

## 2021-12-27 RX ORDER — PRAVASTATIN SODIUM 20 MG/1
20 TABLET ORAL DAILY
Qty: 90 TABLET | Refills: 1 | Status: SHIPPED | OUTPATIENT
Start: 2021-12-27 | End: 2022-07-01 | Stop reason: SDUPTHER

## 2021-12-27 RX ORDER — TRAMADOL HYDROCHLORIDE 50 MG/1
50 TABLET ORAL 2 TIMES DAILY PRN
COMMUNITY
Start: 2021-12-15 | End: 2021-12-27

## 2021-12-27 RX ORDER — LOSARTAN POTASSIUM 25 MG/1
25 TABLET ORAL DAILY
Qty: 90 TABLET | Refills: 1 | Status: SHIPPED | OUTPATIENT
Start: 2021-12-27 | End: 2022-07-01 | Stop reason: SDUPTHER

## 2021-12-28 LAB
SARS-COV-2 RNA RESP QL NAA+PROBE: DETECTED
SARS-COV-2- CYCLE NUMBER: 40

## 2021-12-29 ENCOUNTER — TELEPHONE (OUTPATIENT)
Dept: PRIMARY CARE CLINIC | Facility: CLINIC | Age: 56
End: 2021-12-29
Payer: COMMERCIAL

## 2021-12-29 DIAGNOSIS — U07.1 COVID-19: Primary | ICD-10-CM

## 2022-01-03 DIAGNOSIS — R39.89 SUSPECTED UTI: Primary | ICD-10-CM

## 2022-01-03 RX ORDER — NITROFURANTOIN 25; 75 MG/1; MG/1
100 CAPSULE ORAL 2 TIMES DAILY
Qty: 10 CAPSULE | Refills: 0 | Status: SHIPPED | OUTPATIENT
Start: 2022-01-03 | End: 2022-01-08

## 2022-01-03 NOTE — TELEPHONE ENCOUNTER
Last seen 9/8/2020    Pt c/o bladder infection, reports bladder spasms and urgency.     Advised if not feeling better in 5 days, to call and drop off urine sample.  Pt verbalized understanding.    Scheduled annual in March.    Macrobid x 5 days pended

## 2022-01-10 ENCOUNTER — TELEPHONE (OUTPATIENT)
Dept: PRIMARY CARE CLINIC | Facility: CLINIC | Age: 57
End: 2022-01-10
Payer: COMMERCIAL

## 2022-01-10 DIAGNOSIS — U07.1 COVID-19: Primary | ICD-10-CM

## 2022-01-10 RX ORDER — PROMETHAZINE HYDROCHLORIDE AND DEXTROMETHORPHAN HYDROBROMIDE 6.25; 15 MG/5ML; MG/5ML
5 SYRUP ORAL EVERY 8 HOURS PRN
Qty: 118 ML | Refills: 0 | Status: SHIPPED | OUTPATIENT
Start: 2022-01-10 | End: 2022-01-20

## 2022-01-10 NOTE — TELEPHONE ENCOUNTER
----- Message from Thom Wallace sent at 1/10/2022  3:49 PM CST -----  Contact: [phone] pt  Pt would like cough meds. Pt states cough will not go away. Sent to       [id number] - [place] - [phone]  [place] [address]  Phone: [phone] Fax: [phone]

## 2022-01-12 ENCOUNTER — OFFICE VISIT (OUTPATIENT)
Dept: PRIMARY CARE CLINIC | Facility: CLINIC | Age: 57
End: 2022-01-12
Payer: COMMERCIAL

## 2022-01-12 ENCOUNTER — TELEPHONE (OUTPATIENT)
Dept: PRIMARY CARE CLINIC | Facility: CLINIC | Age: 57
End: 2022-01-12
Payer: COMMERCIAL

## 2022-01-12 VITALS
TEMPERATURE: 98 F | HEIGHT: 62 IN | RESPIRATION RATE: 18 BRPM | OXYGEN SATURATION: 98 % | HEART RATE: 88 BPM | SYSTOLIC BLOOD PRESSURE: 122 MMHG | DIASTOLIC BLOOD PRESSURE: 82 MMHG | BODY MASS INDEX: 25.6 KG/M2 | WEIGHT: 139.13 LBS

## 2022-01-12 DIAGNOSIS — U07.1 COVID-19: Primary | ICD-10-CM

## 2022-01-12 DIAGNOSIS — J02.9 ACUTE PHARYNGITIS, UNSPECIFIED ETIOLOGY: ICD-10-CM

## 2022-01-12 PROCEDURE — 99213 OFFICE O/P EST LOW 20 MIN: CPT | Mod: 25,S$GLB,, | Performed by: INTERNAL MEDICINE

## 2022-01-12 PROCEDURE — 99213 PR OFFICE/OUTPT VISIT, EST, LEVL III, 20-29 MIN: ICD-10-PCS | Mod: 25,S$GLB,, | Performed by: INTERNAL MEDICINE

## 2022-01-12 PROCEDURE — 96372 THER/PROPH/DIAG INJ SC/IM: CPT | Mod: S$GLB,,, | Performed by: INTERNAL MEDICINE

## 2022-01-12 PROCEDURE — 3074F SYST BP LT 130 MM HG: CPT | Mod: CPTII,S$GLB,, | Performed by: INTERNAL MEDICINE

## 2022-01-12 PROCEDURE — 3008F PR BODY MASS INDEX (BMI) DOCUMENTED: ICD-10-PCS | Mod: CPTII,S$GLB,, | Performed by: INTERNAL MEDICINE

## 2022-01-12 PROCEDURE — 3008F BODY MASS INDEX DOCD: CPT | Mod: CPTII,S$GLB,, | Performed by: INTERNAL MEDICINE

## 2022-01-12 PROCEDURE — 3074F PR MOST RECENT SYSTOLIC BLOOD PRESSURE < 130 MM HG: ICD-10-PCS | Mod: CPTII,S$GLB,, | Performed by: INTERNAL MEDICINE

## 2022-01-12 PROCEDURE — 3079F DIAST BP 80-89 MM HG: CPT | Mod: CPTII,S$GLB,, | Performed by: INTERNAL MEDICINE

## 2022-01-12 PROCEDURE — 3079F PR MOST RECENT DIASTOLIC BLOOD PRESSURE 80-89 MM HG: ICD-10-PCS | Mod: CPTII,S$GLB,, | Performed by: INTERNAL MEDICINE

## 2022-01-12 PROCEDURE — 4010F PR ACE/ARB THEARPY RXD/TAKEN: ICD-10-PCS | Mod: CPTII,S$GLB,, | Performed by: INTERNAL MEDICINE

## 2022-01-12 PROCEDURE — 99999 PR PBB SHADOW E&M-EST. PATIENT-LVL III: ICD-10-PCS | Mod: PBBFAC,,, | Performed by: INTERNAL MEDICINE

## 2022-01-12 PROCEDURE — 4010F ACE/ARB THERAPY RXD/TAKEN: CPT | Mod: CPTII,S$GLB,, | Performed by: INTERNAL MEDICINE

## 2022-01-12 PROCEDURE — 1159F PR MEDICATION LIST DOCUMENTED IN MEDICAL RECORD: ICD-10-PCS | Mod: CPTII,S$GLB,, | Performed by: INTERNAL MEDICINE

## 2022-01-12 PROCEDURE — 1159F MED LIST DOCD IN RCRD: CPT | Mod: CPTII,S$GLB,, | Performed by: INTERNAL MEDICINE

## 2022-01-12 PROCEDURE — 96372 PR INJECTION,THERAP/PROPH/DIAG2ST, IM OR SUBCUT: ICD-10-PCS | Mod: S$GLB,,, | Performed by: INTERNAL MEDICINE

## 2022-01-12 PROCEDURE — 99999 PR PBB SHADOW E&M-EST. PATIENT-LVL III: CPT | Mod: PBBFAC,,, | Performed by: INTERNAL MEDICINE

## 2022-01-12 RX ORDER — ALBUTEROL SULFATE 90 UG/1
2 AEROSOL, METERED RESPIRATORY (INHALATION) EVERY 6 HOURS PRN
Qty: 18 G | Refills: 0 | Status: SHIPPED | OUTPATIENT
Start: 2022-01-12 | End: 2022-02-04

## 2022-01-12 RX ORDER — AZITHROMYCIN 250 MG/1
TABLET, FILM COATED ORAL
Qty: 6 TABLET | Refills: 0 | Status: SHIPPED | OUTPATIENT
Start: 2022-01-12 | End: 2022-01-17

## 2022-01-12 RX ORDER — TRIAMCINOLONE ACETONIDE 40 MG/ML
40 INJECTION, SUSPENSION INTRA-ARTICULAR; INTRAMUSCULAR
Status: COMPLETED | OUTPATIENT
Start: 2022-01-12 | End: 2022-01-12

## 2022-01-12 RX ADMIN — TRIAMCINOLONE ACETONIDE 40 MG: 40 INJECTION, SUSPENSION INTRA-ARTICULAR; INTRAMUSCULAR at 03:01

## 2022-01-12 NOTE — TELEPHONE ENCOUNTER
----- Message from Marlen Butcher sent at 1/12/2022  8:09 AM CST -----  Contact: pt [phone]  Would like to get medical advice  Symptoms: sore throat/hard to swallow/ear pain  How long has patient had these symptoms: yesterday  Would the patient like a call back, or a response through their [facility] portal?: call  Pharmacy name and phone #:   [id number] - [place] - [id number]  [place] [address]  Phone: [phone] Fax: [phone]    Comments:    Please call and advise.    Thank you

## 2022-01-12 NOTE — TELEPHONE ENCOUNTER
Pt started with rt ear pain and sore throat that makes it hard to swallow yesterday. No other symptoms. Note: pt tested positive for covid 12/27/21

## 2022-01-12 NOTE — PROGRESS NOTES
Two patient identifiers verified.  Allergies reviewed.  Kenalog 40 MG IM administered to right dorsalgluteal per MD order.  Patient tolerated injection well; no redness, bleeding, or bruising noted to injection site.  Patient instructed to remain in clinic setting for 15 minutes. verbalizes understanding.

## 2022-01-12 NOTE — PROGRESS NOTES
"[facility] Note    Chief Complaint      Chief Complaint   Patient presents with    Otalgia    Sore Throat       History of Present Illness      Padmini Pollock is a 56 y.o.  F with Heptic cyst, Simple Renal cyst, GERD, HLD, Postmenopausal on HRT, HTN, Colon Polyps presents to  chronic issues. Referred by Mom, Christianne . Last visit - 6/30/21.     COVID + 12/27/21 - Still with lingering cough.  Fevers have subsided. Promethazine DM is helping with cough. Will Rx Albuterol prn.     Acute pharyngitis - Pt reports sore throat causing difficulty swallowing and Rt Otalgia x 2 days.  Pt thinks she had a rash with ampicillin in past in 20's and has not been on PCN or PCN derivatives since.  Will Tx with Zpack and admin Kenalog.     Hepatic cyst -await old records to review. Can consider repeat Abd U/S.     Simple Renal cyst -  Await old records to review      GERD - Rec reflux prec. Rec pepcid OTC prn.      HLD - Pt on Pravastatin 20 mg QHS. She just had labs with Cards, Dr. Dobson. Will obtain a copy for review      Postmenopausal on HRT - Pt on estrace per OB.      HTN - Pt on Atenolol  25 mg QHS and Losartan 25 mg 1/2 tab po QHS.  Wt loss - down 12 lb since June with diet.     Colon Polyps - due for repeat C-scope with Dr. Mayorga. Pt plans to set up.      Cough variant asthma - saw Dr. Kovacs in past. She is not on an inhaler - will refill given recent COVID and continued cough.   She reports PFT's were "borderline".      HCM - Flu - 9/2020 - due;  Tdap -1/26/19;  PCV 13 - none;  PVX 23 - none;  Shingrix - none;  COVID - 19 Vaccine (Pfizer)  #1 2/24/21; #2 3/17/21; and # 3 - due; MGM -  3/2021 - at [facility];  DEXA - none;  PAP - 1/30/18 - neg; Hep C Screen - 11/3/21 - neg.;  HIV Screen -- 11/3/21 - neg.; C-scope - 6/20/16 - ulceration - repeat 5 yrs - due;    Prev PCP - none;  Ob/GYN - Dr. Ndiaye; Cards - Dr. Dobson; GI - Dr. Mayorga; Pulm - Dr. Kovacs; Well visit - 6/30/21       Patient Care Team:  Elvia" GISSELLE Chacko MD as PCP - General (Internal Medicine)  Kenyatta Ramirez MA as Care Coordinator  Jose Roberto Mayorga MD as Consulting Physician (Gastroenterology)     Health Maintenance:  Immunization History   Administered Date(s) Administered    Influenza - Quadrivalent - MDCK - PF 10/06/2018, 2019    Influenza - Quadrivalent - PF *Preferred* (6 months and older) 2020    Influenza - Trivalent (ADULT) 2015    Tdap 2019      Health Maintenance   Topic Date Due    Mammogram  2022    Lipid Panel  2026    TETANUS VACCINE  2029    Hepatitis C Screening  Completed        Past Medical History:  Past Medical History:   Diagnosis Date    Depression     Endometriosis     Family history of breast cancer in mother     Hx of migraine headaches     Hyperlipidemia     Hyperlipidemia 2021    Hypertension 2021    Menopause     Prolapsing mitral leaflet syndrome        Past Surgical History:   has a past surgical history that includes  section; Hysterectomy (); and Colonoscopy ().    Family History:  family history includes Breast cancer in her mother; Heart attack in her father; Heart disease (age of onset: 65) in her father; Hypertension in her father and mother; No Known Problems in her sister.     Social History:  Social History     Tobacco Use    Smoking status: Never Smoker    Smokeless tobacco: Never Used   Substance Use Topics    Alcohol use: Yes     Comment: rare - once every 6 mos    Drug use: Never       Review of Systems   Constitutional: Negative for chills and fever.        Fever 10 day ago - resolved   HENT: Positive for ear pain and sore throat. Negative for nasal congestion.    Respiratory: Positive for cough. Negative for shortness of breath and wheezing.    Gastrointestinal: Negative for diarrhea, nausea and vomiting.   Musculoskeletal: Negative for arthralgias and myalgias.   Neurological: Negative for headaches.     "    Medications:    Current Outpatient Medications:     atenoloL (TENORMIN) 25 MG tablet, Take 1 tablet (25 mg total) by mouth once daily., Disp: 90 tablet, Rfl: 1    estradioL (ESTRACE) 1 MG tablet, TAKE 1 TABLET BY MOUTH EVERY DAY, Disp: 90 tablet, Rfl: 3    losartan (COZAAR) 25 MG tablet, Take 1 tablet (25 mg total) by mouth once daily., Disp: 90 tablet, Rfl: 1    pravastatin (PRAVACHOL) 20 MG tablet, Take 1 tablet (20 mg total) by mouth once daily., Disp: 90 tablet, Rfl: 1    promethazine-dextromethorphan (PROMETHAZINE-DM) 6.25-15 mg/5 mL Syrp, Take 5 mLs by mouth every 8 (eight) hours as needed., Disp: 118 mL, Rfl: 0    albuterol (PROAIR HFA) 90 mcg/actuation inhaler, Inhale 2 puffs into the lungs every 6 (six) hours as needed for Wheezing. Rescue, Disp: 18 g, Rfl: 0    azithromycin (Z-FREDDIE) 250 MG tablet, Take 2 tablets by mouth on day 1; Take 1 tablet by mouth on days 2-5, Disp: 6 tablet, Rfl: 0  No current facility-administered medications for this visit.     Allergies:  Review of patient's allergies indicates:  No Known Allergies    Physical Exam      Vital Signs  Temp: 97.9 °F (36.6 °C)  Pulse: 88  Resp: 18  SpO2: 98 %  BP: 122/82  BP Location: Right arm  Patient Position: Sitting  Pain Score: 0-No pain  Height and Weight  Height: 5' 2" (157.5 cm)  Weight: 63.1 kg (139 lb 1.8 oz)  BSA (Calculated - sq m): 1.66 sq meters  BMI (Calculated): 25.4  Weight in (lb) to have BMI = 25: 136.4      Patient Position: Sitting      Physical Exam  Vitals reviewed.   Constitutional:       General: She is not in acute distress.     Appearance: Normal appearance. She is not ill-appearing, toxic-appearing or diaphoretic.   HENT:      Head: Normocephalic and atraumatic.      Right Ear: Tympanic membrane normal.      Left Ear: Tympanic membrane normal.      Mouth/Throat:      Pharynx: Oropharyngeal exudate and posterior oropharyngeal erythema present.      Comments: veronica tonsils with white exudate  Eyes:      Extraocular " Movements: Extraocular movements intact.      Conjunctiva/sclera: Conjunctivae normal.      Pupils: Pupils are equal, round, and reactive to light.   Cardiovascular:      Rate and Rhythm: Normal rate and regular rhythm.      Pulses: Normal pulses.      Heart sounds: Normal heart sounds.   Pulmonary:      Effort: No respiratory distress.      Breath sounds: Normal breath sounds. No wheezing.   Lymphadenopathy:      Cervical: Cervical adenopathy present.   Neurological:      General: No focal deficit present.      Mental Status: She is alert and oriented to person, place, and time.   Psychiatric:         Mood and Affect: Mood normal.         Behavior: Behavior normal.          Laboratory:  CBC:  Recent Labs   Lab 11/03/21 0000   WBC 7.1   RBC 4.27   Hemoglobin 13.8   Hematocrit 40.6   Platelets 337   MCV 95.1   MCH 32.2   MCHC 33.9       CMP:  Recent Labs   Lab 11/03/21  0000   Glucose 92   Calcium 9.4   ALBUMIN 4.0   Total Protein 7.0   Sodium 142   Potassium 4.3   CO2 26   Chloride 103   BUN 10   Creatinine 0.8   Alkaline Phosphatase 121   ALT 58 H   AST 56 H   Total Bilirubin 0.3        LIPIDS:  Recent Labs   Lab 11/03/21  0000   TSH 1.90   HDL 50   Cholesterol 203 H   Triglycerides 142   LDL Calculated 130 H   Non-HDL Cholesterol 153 H       TSH:  Recent Labs   Lab 11/03/21  0000   TSH 1.90      Other:   Recent Labs   Lab 03/12/19  1037   Vit D, 25-Hydroxy 31     Recent Labs   Lab 11/03/21  0000   Hepatitis C Ab NON-REACTIVE       Assessment/Plan     Padmini Pollock is a 56 y.o.female with:    COVID-19  -     albuterol (PROAIR HFA) 90 mcg/actuation inhaler; Inhale 2 puffs into the lungs every 6 (six) hours as needed for Wheezing. Rescue  Dispense: 18 g; Refill: 0  - Doing better. Still has lingering cough.  Lung exam wnl.  Will Refill Albuterol.  Promethazine DM is helping.     Acute pharyngitis, unspecified etiology  -     triamcinolone acetonide injection 40 mg  -     azithromycin (Z-FREDDIE) 250 MG tablet; Take 2  tablets by mouth on day 1; Take 1 tablet by mouth on days 2-5  Dispense: 6 tablet; Refill: 0  - Rec supportive care.  Rec strict hand hygiene.  Gargle with salt water. Rec APAP/Ibuprofen prn T > 100.3/pain. RTC or alert MD if Symptoms persist/worsen. Will Admin Kenalog 40 mg IM x 1 and Rx Zpack given ? Allergy to PCN. D/w pt warning signs for which to alert MD.        Chronic conditions status updated as per HPI.  Other than changes above, cont current medications and maintain follow up with specialists.  Follow up in about 6 months (around 7/1/2022) for well visit or sooner if needed.      Elvia Chacko MD  [facility]

## 2022-01-15 ENCOUNTER — NURSE TRIAGE (OUTPATIENT)
Dept: ADMINISTRATIVE | Facility: CLINIC | Age: 57
End: 2022-01-15
Payer: COMMERCIAL

## 2022-01-15 ENCOUNTER — PATIENT MESSAGE (OUTPATIENT)
Dept: PRIMARY CARE CLINIC | Facility: CLINIC | Age: 57
End: 2022-01-15
Payer: COMMERCIAL

## 2022-01-16 ENCOUNTER — OFFICE VISIT (OUTPATIENT)
Dept: URGENT CARE | Facility: CLINIC | Age: 57
End: 2022-01-16
Payer: COMMERCIAL

## 2022-01-16 VITALS
BODY MASS INDEX: 25.58 KG/M2 | TEMPERATURE: 98 F | SYSTOLIC BLOOD PRESSURE: 111 MMHG | RESPIRATION RATE: 16 BRPM | OXYGEN SATURATION: 96 % | HEART RATE: 92 BPM | DIASTOLIC BLOOD PRESSURE: 71 MMHG | WEIGHT: 139 LBS | HEIGHT: 62 IN

## 2022-01-16 DIAGNOSIS — J03.90 TONSILLITIS WITH EXUDATE: Primary | ICD-10-CM

## 2022-01-16 DIAGNOSIS — J02.9 ACUTE SORE THROAT: ICD-10-CM

## 2022-01-16 PROCEDURE — 87147 CULTURE TYPE IMMUNOLOGIC: CPT | Performed by: NURSE PRACTITIONER

## 2022-01-16 PROCEDURE — 4010F PR ACE/ARB THEARPY RXD/TAKEN: ICD-10-PCS | Mod: CPTII,S$GLB,, | Performed by: NURSE PRACTITIONER

## 2022-01-16 PROCEDURE — 96372 PR INJECTION,THERAP/PROPH/DIAG2ST, IM OR SUBCUT: ICD-10-PCS | Mod: S$GLB,,, | Performed by: EMERGENCY MEDICINE

## 2022-01-16 PROCEDURE — 3008F PR BODY MASS INDEX (BMI) DOCUMENTED: ICD-10-PCS | Mod: CPTII,S$GLB,, | Performed by: NURSE PRACTITIONER

## 2022-01-16 PROCEDURE — 3074F SYST BP LT 130 MM HG: CPT | Mod: CPTII,S$GLB,, | Performed by: NURSE PRACTITIONER

## 2022-01-16 PROCEDURE — 1160F PR REVIEW ALL MEDS BY PRESCRIBER/CLIN PHARMACIST DOCUMENTED: ICD-10-PCS | Mod: CPTII,S$GLB,, | Performed by: NURSE PRACTITIONER

## 2022-01-16 PROCEDURE — 99214 PR OFFICE/OUTPT VISIT, EST, LEVL IV, 30-39 MIN: ICD-10-PCS | Mod: 25,S$GLB,, | Performed by: NURSE PRACTITIONER

## 2022-01-16 PROCEDURE — 3078F DIAST BP <80 MM HG: CPT | Mod: CPTII,S$GLB,, | Performed by: NURSE PRACTITIONER

## 2022-01-16 PROCEDURE — 1159F PR MEDICATION LIST DOCUMENTED IN MEDICAL RECORD: ICD-10-PCS | Mod: CPTII,S$GLB,, | Performed by: NURSE PRACTITIONER

## 2022-01-16 PROCEDURE — 87070 CULTURE OTHR SPECIMN AEROBIC: CPT | Performed by: NURSE PRACTITIONER

## 2022-01-16 PROCEDURE — 1160F RVW MEDS BY RX/DR IN RCRD: CPT | Mod: CPTII,S$GLB,, | Performed by: NURSE PRACTITIONER

## 2022-01-16 PROCEDURE — 3078F PR MOST RECENT DIASTOLIC BLOOD PRESSURE < 80 MM HG: ICD-10-PCS | Mod: CPTII,S$GLB,, | Performed by: NURSE PRACTITIONER

## 2022-01-16 PROCEDURE — 1159F MED LIST DOCD IN RCRD: CPT | Mod: CPTII,S$GLB,, | Performed by: NURSE PRACTITIONER

## 2022-01-16 PROCEDURE — 4010F ACE/ARB THERAPY RXD/TAKEN: CPT | Mod: CPTII,S$GLB,, | Performed by: NURSE PRACTITIONER

## 2022-01-16 PROCEDURE — 3074F PR MOST RECENT SYSTOLIC BLOOD PRESSURE < 130 MM HG: ICD-10-PCS | Mod: CPTII,S$GLB,, | Performed by: NURSE PRACTITIONER

## 2022-01-16 PROCEDURE — 96372 THER/PROPH/DIAG INJ SC/IM: CPT | Mod: S$GLB,,, | Performed by: EMERGENCY MEDICINE

## 2022-01-16 PROCEDURE — 99214 OFFICE O/P EST MOD 30 MIN: CPT | Mod: 25,S$GLB,, | Performed by: NURSE PRACTITIONER

## 2022-01-16 PROCEDURE — 3008F BODY MASS INDEX DOCD: CPT | Mod: CPTII,S$GLB,, | Performed by: NURSE PRACTITIONER

## 2022-01-16 RX ORDER — CLINDAMYCIN HYDROCHLORIDE 300 MG/1
300 CAPSULE ORAL EVERY 6 HOURS
Qty: 28 CAPSULE | Refills: 0 | Status: SHIPPED | OUTPATIENT
Start: 2022-01-16 | End: 2022-01-20

## 2022-01-16 RX ORDER — CLINDAMYCIN PHOSPHATE 150 MG/ML
600 INJECTION, SOLUTION INTRAVENOUS
Status: COMPLETED | OUTPATIENT
Start: 2022-01-16 | End: 2022-01-16

## 2022-01-16 RX ADMIN — CLINDAMYCIN PHOSPHATE 600 MG: 150 INJECTION, SOLUTION INTRAVENOUS at 05:01

## 2022-01-16 NOTE — TELEPHONE ENCOUNTER
"Padmini called to report worsening sore throat, right side, with right ear pain adjacent to the sore throat.  She has not been taking the promethazine DM, as it makes her sleepy, and has not used the albuterol inhaler as "I don't want to take too many medications."  She was diagnosed with Covid 19 on 12/27, began to feel better, then began to have fever of 101.0 last night, with the sore throat and cough.  Can eat and drink cold foods / liquids only.  Is gargling every 4 hours or so. No SOB. No CP. She did see Elvia Chacko MD, pcp, on 01/12/2022 and began taking azithromycin.  She will continue this abx, and will call [facility] for virtual visit tonight, she said. Encouraged use of inhaler and promethazine tonight, to see if she gets some relief. I recommended go to [facility] tomorrow morning if she is feeling worse.  And encouraged ED if any swelling of the throat, or unable to swallow, or pain becomes severe, fever > 103.0.  She said she will. Message to Elvia Chacko MD, pcp.  Please contact caller directly with any additional care advice.      Reason for Disposition   [1] Continuous (nonstop) coughing interferes with work or school AND [2] no improvement using cough treatment per protocol    Additional Information   Negative: SEVERE difficulty breathing (e.g., struggling for each breath, speaks in single words)   Negative: Difficult to awaken or acting confused (e.g., disoriented, slurred speech)   Negative: Bluish (or gray) lips or face now   Negative: Shock suspected (e.g., cold/pale/clammy skin, too weak to stand, low BP, rapid pulse)   Negative: Sounds like a life-threatening emergency to the triager   Negative: SEVERE or constant chest pain or pressure (Exception: mild central chest pain, present only when coughing)   Negative: MODERATE difficulty breathing (e.g., speaks in phrases, SOB even at rest, pulse 100-120)   Negative: [1] Headache AND [2] stiff neck (can't touch chin to chest)   " Negative: MILD difficulty breathing (e.g., minimal/no SOB at rest, SOB with walking, pulse <100)   Negative: Chest pain or pressure   Negative: Patient sounds very sick or weak to the triager   Negative: Fever > 103 F (39.4 C)   Negative: [1] Fever > 101 F (38.3 C) AND [2] age > 60 years   Negative: [1] Fever > 100.0 F (37.8 C) AND [2] bedridden (e.g., nursing home patient, CVA, chronic illness, recovering from surgery)   Negative: HIGH RISK for severe COVID complications (e.g., age > 64 years, obesity with BMI > 25, pregnant, chronic lung disease or other chronic medical condition)  (Exception: Already seen by PCP and no new or worsening symptoms.)   Negative: [1] HIGH RISK patient AND [2] influenza is widespread in the community AND [3] ONE OR MORE respiratory symptoms: cough, sore throat, runny or stuffy nose   Negative: [1] HIGH RISK patient AND [2] influenza exposure within the last 7 days AND [3] ONE OR MORE respiratory symptoms: cough, sore throat, runny or stuffy nose   Negative: [1] COVID-19 infection suspected by caller or triager AND [2] mild symptoms (cough, fever, or others) AND [3] negative COVID-19 rapid test   Negative: Fever present > 3 days (72 hours)   Negative: [1] Fever returns after gone for over 24 hours AND [2] symptoms worse or not improved    Protocols used: CORONAVIRUS (COVID-19) DIAGNOSED OR [id number]

## 2022-01-16 NOTE — PATIENT INSTRUCTIONS
Return to Urgent Care or go to ER if symptoms worsen or fail to improve.  Follow up with PCP as recommended for further management.   We will notify you of any test results (if performed) in 3-5 days.   If you develop difficulty swallowing liquids, severe worsening throat pain, fever, go to ED.     Patient Education       Sore Throat Discharge Instructions, Adult   About this topic   Swelling at the back of your throat is called pharyngitis. Swelling of your throat and tonsils is tonsillopharyngitis. Both are commonly called a sore throat. Your sore throat is likely caused by a virus. Most of the time, a sore throat will go away without antibiotics in a week or two.           What care is needed at home?   · Ask your doctor what you need to do when you go home. Make sure you ask questions if you do not understand what the doctor says. This way you will know what you need to do.  · To help ease a sore throat you can:  ? Use a sore throat spray.  ? Suck on hard candy or throat lozenges.  ? Gargle with warm saltwater a few times each day. Mix of 1/4 teaspoon (1.25 grams) salt in 8 ounces (240 mL) of warm water.  ? Use a cool mist humidifier to help you breathe easier.  · You may want to take medicine like acetaminophen, ibuprofen, or naproxen for pain.  · If you decide to take over-the-counter cough or cold medicines, follow the directions on the label carefully. Be sure you do not take more than one medicine that contains acetaminophen. Also, if you have a heart problem or high blood pressure, check with your doctor before you take any of these medicines.  · Wash your hands often. This will help keep others healthy.  What follow-up care is needed?   Your doctor may ask you to make visits to the office to check on your progress. Be sure to keep these visits.  What drugs may be needed?   Take your drugs as ordered by your doctor. Do not skip doses or stop when you feel better. The doctor may order drugs to:  · Prevent  or fight an infection  · Help with pain  · Lower fever  · Help nasal congestion and runny nose  · Soothe the throat  Will physical activity be limited?   You may need to rest at home for 1 to 2 days or until you are feeling well.  What changes to diet are needed?   If your throat feels too sore to eat solid foods you may drink juice, milk, milkshakes, or soups. Talk to your doctor about what diet is proper for your condition.  What can be done to prevent this health problem?   · Wash your hands often. Be sure to wash after you blow your nose or take care of others with a sore throat.  · Do not share utensils and drinking glasses with someone who has a sore throat. Wash these objects with hot, soapy water.  · Do not share your foods or drinks with others while you are sick. You might infect them.  · Throw away used tissues right away and then wash your hands.  · Get a new toothbrush after signs are gone or you are done with your antibiotics.  When do I need to call the doctor?   · You have trouble breathing.  · Your neck, tongue, or throat is swelling.  · You are drooling because you cannot swallow your saliva.  · You have very bad pain in your throat that keeps you from eating or drinking anything.  · There are large, painful lumps in your neck.  · You have blisters in the back of your throat.  Teach Back: Helping You Understand   The Teach Back Method helps you understand the information we are giving you. After you talk with the staff, tell them in your own words what you learned. This helps to make sure the staff has described each thing clearly. It also helps to explain things that may have been confusing. Before going home, make sure you can do these:  · I can tell you about my condition.  · I can tell you what may help ease my pain.  · I can tell you what I will do if I have trouble breathing or swallowing or have large painful lumps in my throat.  Where can I learn more?   Centers for Disease Control and  Prevention  https://www.cdc.gov/antibiotic-use/community/for-patients/common-illnesses/sore-throat.html   Ministry of Health  https://www.health.govt.nz/your-health/conditions-and-treatments/diseases-and-illnesses/sore-throat   NHS Choices  https://www.nhs.uk/conditions/sore-throat/   Last Reviewed Date   2021-06-08  Consumer Information Use and Disclaimer   This information is not specific medical advice and does not replace information you receive from your health care provider. This is only a brief summary of general information. It does NOT include all information about conditions, illnesses, injuries, tests, procedures, treatments, therapies, discharge instructions or life-style choices that may apply to you. You must talk with your health care provider for complete information about your health and treatment options. This information should not be used to decide whether or not to accept your health care providers advice, instructions or recommendations. Only your health care provider has the knowledge and training to provide advice that is right for you.  Copyright   Copyright © 2021 [employer], [employer] and its affiliates and/or licensors. All rights reserved.

## 2022-01-16 NOTE — PROGRESS NOTES
"Subjective:       Patient ID: Padmini Pollock is a 56 y.o. female.    Vitals:  height is 5' 2" (1.575 m) and weight is 63 kg (139 lb). Her tympanic temperature is 98 °F (36.7 °C). Her blood pressure is 111/71 and her pulse is 92. Her respiration is 16 and oxygen saturation is 96%.     Chief Complaint: Otalgia    Pt was recently covid + on 12/27 . Pt stated thast she has been having right ear pain . Patient stated that she has been covid vaccinated . Pts pharmacy has been updated.  She was seen by PCP and prescribed zpack-- today is her last dose.     Otalgia   There is pain in the right ear. This is a new problem. The current episode started in the past 7 days. The problem occurs constantly. The problem has been unchanged. The maximum temperature recorded prior to her arrival was 101 - 101.9 F. The fever has been present for less than 1 day. The pain is mild. Associated symptoms include a sore throat. Pertinent negatives include no abdominal pain, coughing, diarrhea, ear discharge, headaches, hearing loss, neck pain, rash, rhinorrhea or vomiting. The treatment provided no relief. There is no history of a chronic ear infection, hearing loss or a tympanostomy tube.       HENT: Positive for ear pain and sore throat. Negative for ear discharge and hearing loss.    Neck: Negative for neck pain.   Respiratory: Negative for cough.    Gastrointestinal: Negative for abdominal pain, vomiting and diarrhea.   Skin: Negative for rash.   Neurological: Negative for headaches.       Objective:      Physical Exam   Constitutional:  Non-toxic appearance. She appears ill. No distress.   HENT:   Ears:   Right Ear: Tympanic membrane and ear canal normal.   Left Ear: Tympanic membrane and ear canal normal.   Nose: Mucosal edema and rhinorrhea present.   Mouth/Throat: Uvula is midline and mucous membranes are normal. No uvula swelling. Oropharyngeal exudate, posterior oropharyngeal edema and posterior oropharyngeal erythema present. No " tonsillar abscesses. Tonsillar exudate.   Neck: Neck supple. No torticollis present. No neck rigidity present. No decreased range of motion present.   Pulmonary/Chest: No respiratory distress (patient speaks in complete sentences without pause).   Lymphadenopathy:     She has cervical adenopathy.        Right cervical: Superficial cervical adenopathy present.        Left cervical: Superficial cervical adenopathy present.   Neurological: She is alert.   Skin: Skin is warm, dry and not diaphoretic.   Nursing note and vitals reviewed.        Assessment:       1. Tonsillitis with exudate    2. Acute sore throat          Plan:         Tonsillitis with exudate  -     Culture, Throat  -     clindamycin injection 600 mg  -     clindamycin (CLEOCIN) 300 MG capsule; Take 1 capsule (300 mg total) by mouth every 6 (six) hours. With food for 7 days  Dispense: 28 capsule; Refill: 0  -     (Magic mouthwash) 1:1:1 diphenhydramine(Benadryl) 12.5mg/5ml liq, aluminum & magnesium hydroxide-simethicone (Maalox), LIDOcaine viscous 2%; Swish and spit 10 mLs every 4 (four) hours as needed (sore throat).  Dispense: 100 mL; Refill: 0    Acute sore throat  -     Cancel: POCT Strep A, Molecular  -     Culture, Throat  -     clindamycin injection 600 mg  -     clindamycin (CLEOCIN) 300 MG capsule; Take 1 capsule (300 mg total) by mouth every 6 (six) hours. With food for 7 days  Dispense: 28 capsule; Refill: 0  -     (Magic mouthwash) 1:1:1 diphenhydramine(Benadryl) 12.5mg/5ml liq, aluminum & magnesium hydroxide-simethicone (Maalox), LIDOcaine viscous 2%; Swish and spit 10 mLs every 4 (four) hours as needed (sore throat).  Dispense: 100 mL; Refill: 0      Patient reports that she was dx with Covid 19 about 3 weeks ago-- then about 1 week ago she developed worsening throat pain-- seen by PCP about 5 days ago who noted exudative tonsillitis and prescribed zpack and steroid shot.  Patient states she is not feeling any better and will finish the  "zpack today.    Attempted to obtain poct molecular strep which returned "invalid" x 3.  Throat culture obtained and sent to lab.  Treated with clinda IM and oral, throat culture sent and strict ER precautions given to patient.                  "

## 2022-01-18 ENCOUNTER — TELEPHONE (OUTPATIENT)
Dept: PRIMARY CARE CLINIC | Facility: CLINIC | Age: 57
End: 2022-01-18
Payer: COMMERCIAL

## 2022-01-18 LAB — BACTERIA THROAT CULT: NORMAL

## 2022-01-18 NOTE — TELEPHONE ENCOUNTER
----- Message from Thom Wallace sent at 1/18/2022 11:38 AM CST -----  Contact: [phone] pt  Pt states she read her results and is not fulling understanding the results. It was a throat culture. Please call and advise

## 2022-01-19 ENCOUNTER — TELEPHONE (OUTPATIENT)
Dept: URGENT CARE | Facility: CLINIC | Age: 57
End: 2022-01-19
Payer: COMMERCIAL

## 2022-01-20 ENCOUNTER — OFFICE VISIT (OUTPATIENT)
Dept: URGENT CARE | Facility: CLINIC | Age: 57
End: 2022-01-20
Payer: COMMERCIAL

## 2022-01-20 VITALS
OXYGEN SATURATION: 97 % | RESPIRATION RATE: 16 BRPM | SYSTOLIC BLOOD PRESSURE: 129 MMHG | HEART RATE: 59 BPM | HEIGHT: 62 IN | TEMPERATURE: 98 F | DIASTOLIC BLOOD PRESSURE: 60 MMHG | WEIGHT: 138 LBS | BODY MASS INDEX: 25.4 KG/M2

## 2022-01-20 DIAGNOSIS — J02.9 PHARYNGITIS, UNSPECIFIED ETIOLOGY: Primary | ICD-10-CM

## 2022-01-20 DIAGNOSIS — B27.90 INFECTIOUS MONONUCLEOSIS WITHOUT COMPLICATION, INFECTIOUS MONONUCLEOSIS DUE TO UNSPECIFIED ORGANISM: ICD-10-CM

## 2022-01-20 LAB
CTP QC/QA: YES
HETEROPH AB SER QL: POSITIVE

## 2022-01-20 PROCEDURE — 3008F PR BODY MASS INDEX (BMI) DOCUMENTED: ICD-10-PCS | Mod: CPTII,S$GLB,, | Performed by: NURSE PRACTITIONER

## 2022-01-20 PROCEDURE — 99213 PR OFFICE/OUTPT VISIT, EST, LEVL III, 20-29 MIN: ICD-10-PCS | Mod: S$GLB,,, | Performed by: NURSE PRACTITIONER

## 2022-01-20 PROCEDURE — 99213 OFFICE O/P EST LOW 20 MIN: CPT | Mod: S$GLB,,, | Performed by: NURSE PRACTITIONER

## 2022-01-20 PROCEDURE — 3078F DIAST BP <80 MM HG: CPT | Mod: CPTII,S$GLB,, | Performed by: NURSE PRACTITIONER

## 2022-01-20 PROCEDURE — 3078F PR MOST RECENT DIASTOLIC BLOOD PRESSURE < 80 MM HG: ICD-10-PCS | Mod: CPTII,S$GLB,, | Performed by: NURSE PRACTITIONER

## 2022-01-20 PROCEDURE — 1160F RVW MEDS BY RX/DR IN RCRD: CPT | Mod: CPTII,S$GLB,, | Performed by: NURSE PRACTITIONER

## 2022-01-20 PROCEDURE — 4010F PR ACE/ARB THEARPY RXD/TAKEN: ICD-10-PCS | Mod: CPTII,S$GLB,, | Performed by: NURSE PRACTITIONER

## 2022-01-20 PROCEDURE — 4010F ACE/ARB THERAPY RXD/TAKEN: CPT | Mod: CPTII,S$GLB,, | Performed by: NURSE PRACTITIONER

## 2022-01-20 PROCEDURE — 1159F MED LIST DOCD IN RCRD: CPT | Mod: CPTII,S$GLB,, | Performed by: NURSE PRACTITIONER

## 2022-01-20 PROCEDURE — 3074F SYST BP LT 130 MM HG: CPT | Mod: CPTII,S$GLB,, | Performed by: NURSE PRACTITIONER

## 2022-01-20 PROCEDURE — 86308 POCT INFECTIOUS MONONUCLEOSIS: ICD-10-PCS | Mod: QW,S$GLB,, | Performed by: NURSE PRACTITIONER

## 2022-01-20 PROCEDURE — 86308 HETEROPHILE ANTIBODY SCREEN: CPT | Mod: QW,S$GLB,, | Performed by: NURSE PRACTITIONER

## 2022-01-20 PROCEDURE — 1159F PR MEDICATION LIST DOCUMENTED IN MEDICAL RECORD: ICD-10-PCS | Mod: CPTII,S$GLB,, | Performed by: NURSE PRACTITIONER

## 2022-01-20 PROCEDURE — 1160F PR REVIEW ALL MEDS BY PRESCRIBER/CLIN PHARMACIST DOCUMENTED: ICD-10-PCS | Mod: CPTII,S$GLB,, | Performed by: NURSE PRACTITIONER

## 2022-01-20 PROCEDURE — 3074F PR MOST RECENT SYSTOLIC BLOOD PRESSURE < 130 MM HG: ICD-10-PCS | Mod: CPTII,S$GLB,, | Performed by: NURSE PRACTITIONER

## 2022-01-20 PROCEDURE — 3008F BODY MASS INDEX DOCD: CPT | Mod: CPTII,S$GLB,, | Performed by: NURSE PRACTITIONER

## 2022-01-20 NOTE — LETTER
January 20, 2022      [facility]  [address], [address]  [place] [address]  Phone: [phone]  Fax: [phone]       Patient: Padmini Pollock   YOB: 1965  Date of Visit: 01/20/2022    To Whom It May Concern:    Homer Pollock  was at [facility] on 01/20/2022 and tested POSITIVE FOR MONO.   The patient may return to work/school on 1/24/22 as long as she is without fever for 24 hours before returning on this day.   If you have any questions or concerns, or if I can be of further assistance, please do not hesitate to contact me.    Sincerely,    Jaja Carrillo NP

## 2022-01-20 NOTE — PATIENT INSTRUCTIONS
Mono   If your condition worsens or fails to improve we recommend that you receive another evaluation at the ER immediately or contact your PCP to discuss your concerns or return here. You must understand that you've received an urgent care treatment only and that you may be released before all your medical problems are known or treated. You the patient will arrange for followup care as instructed.   Your Mono Spot Test was Positive  Because this condition is viral, supportive care is mainstay of treatment:  - Drink plenty of cool liquids while avoid any beverage or food that can irritate your throat (acidic, spicy or salty foods).  - Throw away your toothbrush now and start a new one.   -  Tylenol or ibuprofen for pain may help as long as you are not allergic to these meds or have a medical condition such as stomach ulcers, liver or kidney disease or taking blood thinners etc that would   prevent you from using these medications.   -  Rest and fluids will help as well.   -  Avoid contact sports (including football, gymnastics, rugby, hockey, lacrosse, wrestling, diving, and basketball) or activities, associated with increased abdominal pressure (such as weightlifting) for minimum of 4 weeks.    -  Most symptoms resolve in 1 to 2 weeks, although fatigue can persist for a couple of weeks thereafPatient Education       Mononucleosis Discharge Instructions   About this topic   Mononucleosis, or mono, is an infection that causes flu-like signs. These include fever, feeling tired, and swollen neck glands. It is caused by a germ called the Fabio-Barr virus.  The infection may go away on its own. You will start to feel better in 1 to 2 weeks. Full recovery may happen after 2 to 3 months. The older you are when you get mono, the worse the signs. You may treat this illness at home with rest, drinking lots of fluids, and taking over-the-counter (OTC) drugs.          What care is needed at home?   · Ask your doctor what you need to do when you go home. Make sure you ask questions if you do not understand what the doctor says. This way you will know what you need to do.  · Take all drugs as ordered by your doctor.  · Drink lots of fluids to keep your urine light yellow.  · Do not share drinking containers, toothbrushes, facecloths, lip gloss, lip balm, lipstick, or moisturizers with anyone.  · Put a cool mist humidifier in your room to keep your throat moist.  · Gargle with warm salt water every after meal. Mix 1/2 teaspoon (2.5 grams) salt with a cup (240 mL) of warm water.  · You may suck on lozenges, popsicles, or candies to ease throat pain.  What follow-up care is needed?   · Your doctor may ask you to make visits to the office to check on your progress. Be sure to keep these visits.  · If the infection is very bad, your doctor may order other tests or blood work.  What drugs may be needed?   The doctor may order drugs to:  · Help with pain  · Lower fever  Will physical activity be limited?   · You may need to limit your activity until you feel better. Talk to your doctor about the right amount of activity for you.  · If you have sharp belly pain, avoid activities that take a lot of energy.  · You may need bedrest for a few days to let your body recover from the infection. Slowly add activities over a few weeks to months until you get to your former activity level.  · Avoid contact sports for 1 month after infection onset, to prevent spleen rupture.  What changes to diet are needed?   · Eat soft foods like soup and pureed fruit and vegetables if swallowing is painful.  · Do not drink sports drinks, soft drinks, or undiluted fruit juice. They have too much sugar and may cause fluid loss and throat pain. Instead try herbal teas, diluted fruit juice, and water.  · Avoid caffeine, smoking, and beer, wine, and mixed drinks (alcohol). These can make your signs worse.  What  problems could happen?   · Liver damage  · Spleen breaks open. This is a rupture.  What can be done to prevent this health problem?   · Avoid kissing others who have the signs of mono, such as fever or sore throat.  · Do not drink from the glasses or water bottles of others.  · Keep your eating utensils and glasses away from the rest of your family. This will help prevent the spread of infection.  · Cover your nose and mouth when you sneeze or cough.  When do I need to call the doctor?   · Signs of infection. These include a fever of 100.4°F (38°C) or higher, chills.  · Very bad belly pain  · Yellow color of your eyes and skin  · Health problem is not better or you are feeling worse  Helpful tips   When you start to feel better, take it slow and dont overdo things.  Teach Back: Helping You Understand   The Teach Back Method helps you understand the information we are giving you. After you talk with the staff, tell them in your own words what you learned. This helps to make sure the staff has described each thing clearly. It also helps to explain things that may have been confusing. Before going home, make sure you can do these:  · I can tell you about my condition.  · I can tell you what may help ease my sore throat.  · I can tell you what I will do to keep others from getting sick.  · I can tell you what I will do if I have very bad belly pain or a yellow color of my eyes and skin.  Where can I learn more?   Centers for Disease Control and Prevention ? National Center for Infectious Diseases  http://www.cdc.gov/mayda-barr/index.html   Family Doctor  https://familydoctor.org/condition/mononucleosis/   KidsHealth  http://kidshealth.org/teen/infections/common/mononucleosis.html   National Health Service  https://www.nhs.uk/conditions/glandular-fever/   Last Reviewed Date   2020-04-08  Consumer Information Use and Disclaimer   This information is not specific medical advice and does not replace information you  receive from your health care provider. This is only a brief summary of general information. It does NOT include all information about conditions, illnesses, injuries, tests, procedures, treatments, therapies, discharge instructions or life-style choices that may apply to you. You must talk with your health care provider for complete information about your health and treatment options. This information should not be used to decide whether or not to accept your health care providers advice, instructions or recommendations. Only your health care provider has the knowledge and training to provide advice that is right for you.  Copyright   Copyright © 2021 [employer] and its affiliates and/or licensors. All rights reserved.

## 2022-01-20 NOTE — PROGRESS NOTES
"Subjective:       Patient ID: Padmini Pollock is a 56 y.o. female.    Vitals:  height is 5' 2" (1.575 m) and weight is 62.6 kg (138 lb). Her tympanic temperature is 98.3 °F (36.8 °C). Her blood pressure is 129/60 and her pulse is 59 (abnormal). Her respiration is 16 and oxygen saturation is 97%.     Chief Complaint: Sore Throat    Patient was covid positive on Dec 22, 2021. She was seen here at  on 01/16/2022 and had a throat culture done. She was advised yesterday after receiving negative throat culture that if not feeling better to follow up and possibly be tested for mono.    Sore Throat   This is a new problem. The current episode started 1 to 4 weeks ago. The problem has been gradually worsening. Neither side of throat is experiencing more pain than the other. There has been no fever. The pain is at a severity of 3/10. The pain is mild. Associated symptoms include coughing, swollen glands and trouble swallowing. Pertinent negatives include no abdominal pain, congestion, diarrhea, ear discharge, ear pain, headaches, neck pain, shortness of breath or vomiting. She has had no exposure to strep or mono. Treatments tried: antibiotics  The treatment provided mild relief.       Constitution: Positive for fatigue. Negative for chills, sweating, fever and generalized weakness.   HENT: Positive for sore throat and trouble swallowing. Negative for ear pain, ear discharge, congestion, postnasal drip, sinus pain, sinus pressure and voice change.    Neck: Negative for neck pain and neck stiffness.   Cardiovascular: Negative for chest pain, palpitations and sob on exertion.   Respiratory: Positive for cough. Negative for chest tightness, shortness of breath and wheezing.    Gastrointestinal: Negative for abdominal pain, nausea, vomiting and diarrhea.   Musculoskeletal: Negative for muscle ache.   Neurological: Negative for dizziness, light-headedness and headaches.       Objective:      Physical Exam   Constitutional: She is " oriented to person, place, and time. She appears well-developed and well-nourished. She is cooperative.  Non-toxic appearance. She does not have a sickly appearance. She does not appear ill. No distress.   HENT:   Head: Normocephalic and atraumatic.   Ears:   Right Ear: Hearing, tympanic membrane, external ear and ear canal normal.   Left Ear: Hearing, tympanic membrane, external ear and ear canal normal.   Nose: Nose normal. No mucosal edema, rhinorrhea or nasal deformity. No epistaxis. Right sinus exhibits no maxillary sinus tenderness and no frontal sinus tenderness. Left sinus exhibits no maxillary sinus tenderness and no frontal sinus tenderness.   Mouth/Throat: Uvula is midline and mucous membranes are normal. Mucous membranes are moist. No trismus in the jaw. Normal dentition. No uvula swelling. Posterior oropharyngeal erythema present. No oropharyngeal exudate or posterior oropharyngeal edema. Tonsils are 2+ on the right. Tonsils are 2+ on the left. No tonsillar exudate. Oropharynx is clear.   Eyes: Conjunctivae and lids are normal. Pupils are equal, round, and reactive to light. No scleral icterus.      extraocular movement intact   Neck: Trachea normal and phonation normal. Neck supple. No edema present. No erythema present. No neck rigidity present.   Cardiovascular: Normal rate, regular rhythm, S1 normal, S2 normal, normal heart sounds, intact distal pulses and normal pulses. Exam reveals no decreased pulses.   Pulmonary/Chest: Effort normal and breath sounds normal. No accessory muscle usage. No respiratory distress. She has no decreased breath sounds. She has no wheezes. She has no rhonchi. She has no rales.   Abdominal: Normal appearance.   Musculoskeletal: Normal range of motion.         General: No deformity or edema. Normal range of motion.   Lymphadenopathy:     She has no cervical adenopathy.   Neurological: She is alert and oriented to person, place, and time. She exhibits normal muscle tone.  Coordination normal.   Skin: Skin is warm, dry, intact, not diaphoretic and not pale.   Psychiatric: She has a normal mood and affect. Her speech is normal and behavior is normal. Judgment and thought content normal. Cognition and memory  Nursing note and vitals reviewed.        Results for orders placed or performed in visit on 01/20/22   POCT Infectious mononucleosis antibody   Result Value Ref Range    Monospot Positive (A) Negative     Acceptable Yes       ]  Assessment:       1. Pharyngitis, unspecified etiology    2. Infectious mononucleosis without complication, infectious mononucleosis due to unspecified organism          Plan:       Reviewed positive mono test with patient who verbalized understanding.  We discussed treatment with over the counter meds to cover the viral throat infection as well as magic mouthwash to cover sore throat symptoms.  We also discussed at home remedies to help with current symptoms and   that can also help with diagnosis.  Patient educational handouts also included with discharge instructions for patient who verbalized understanding agrees with plan of care.  Patient denies any further questions or concerns at this time.  Patient exits exam room in no acute distress.  Pharyngitis, unspecified etiology  -     POCT Infectious mononucleosis antibody    Infectious mononucleosis without complication, infectious mononucleosis due to unspecified organism      Patient Instructions                                                               Mono   If your condition worsens or fails to improve we recommend that you receive another evaluation at the ER immediately or contact your PCP to discuss your concerns or return here. You must understand that you've received an urgent care treatment only and that you may be released before all your medical problems are known or treated. You the patient will arrange for followup care as instructed.   Your Mono Spot Test was  Positive  Because this condition is viral, supportive care is mainstay of treatment:  - Drink plenty of cool liquids while avoid any beverage or food that can irritate your throat (acidic, spicy or salty foods).  - Throw away your toothbrush now and start a new one.   -  Tylenol or ibuprofen for pain may help as long as you are not allergic to these meds or have a medical condition such as stomach ulcers, liver or kidney disease or taking blood thinners etc that would   prevent you from using these medications.   -  Rest and fluids will help as well.   -  Avoid contact sports (including football, gymnastics, rugby, hockey, lacrosse, wrestling, diving, and basketball) or activities, associated with increased abdominal pressure (such as weightlifting) for minimum of 4 weeks.    -  Most symptoms resolve in 1 to 2 weeks, although fatigue can persist for a couple of weeks thereafPatient Education       Mononucleosis Discharge Instructions   About this topic   Mononucleosis, or mono, is an infection that causes flu-like signs. These include fever, feeling tired, and swollen neck glands. It is caused by a germ called the Fabio-Barr virus.  The infection may go away on its own. You will start to feel better in 1 to 2 weeks. Full recovery may happen after 2 to 3 months. The older you are when you get mono, the worse the signs. You may treat this illness at home with rest, drinking lots of fluids, and taking over-the-counter (OTC) drugs.         What care is needed at home?   · Ask your doctor what you need to do when you go home. Make sure you ask questions if you do not understand what the doctor says. This way you will know what you need to do.  · Take all drugs as ordered by your doctor.  · Drink lots of fluids to keep your urine light yellow.  · Do not share drinking containers, toothbrushes, facecloths, lip gloss, lip balm, lipstick, or moisturizers with anyone.  · Put a cool mist humidifier in your room to keep your  throat moist.  · Gargle with warm salt water every after meal. Mix 1/2 teaspoon (2.5 grams) salt with a cup (240 mL) of warm water.  · You may suck on lozenges, popsicles, or candies to ease throat pain.  What follow-up care is needed?   · Your doctor may ask you to make visits to the office to check on your progress. Be sure to keep these visits.  · If the infection is very bad, your doctor may order other tests or blood work.  What drugs may be needed?   The doctor may order drugs to:  · Help with pain  · Lower fever  Will physical activity be limited?   · You may need to limit your activity until you feel better. Talk to your doctor about the right amount of activity for you.  · If you have sharp belly pain, avoid activities that take a lot of energy.  · You may need bedrest for a few days to let your body recover from the infection. Slowly add activities over a few weeks to months until you get to your former activity level.  · Avoid contact sports for 1 month after infection onset, to prevent spleen rupture.  What changes to diet are needed?   · Eat soft foods like soup and pureed fruit and vegetables if swallowing is painful.  · Do not drink sports drinks, soft drinks, or undiluted fruit juice. They have too much sugar and may cause fluid loss and throat pain. Instead try herbal teas, diluted fruit juice, and water.  · Avoid caffeine, smoking, and beer, wine, and mixed drinks (alcohol). These can make your signs worse.  What problems could happen?   · Liver damage  · Spleen breaks open. This is a rupture.  What can be done to prevent this health problem?   · Avoid kissing others who have the signs of mono, such as fever or sore throat.  · Do not drink from the glasses or water bottles of others.  · Keep your eating utensils and glasses away from the rest of your family. This will help prevent the spread of infection.  · Cover your nose and mouth when you sneeze or cough.  When do I need to call the doctor?    · Signs of infection. These include a fever of 100.4°F (38°C) or higher, chills.  · Very bad belly pain  · Yellow color of your eyes and skin  · Health problem is not better or you are feeling worse  Helpful tips   When you start to feel better, take it slow and dont overdo things.  Teach Back: Helping You Understand   The Teach Back Method helps you understand the information we are giving you. After you talk with the staff, tell them in your own words what you learned. This helps to make sure the staff has described each thing clearly. It also helps to explain things that may have been confusing. Before going home, make sure you can do these:  · I can tell you about my condition.  · I can tell you what may help ease my sore throat.  · I can tell you what I will do to keep others from getting sick.  · I can tell you what I will do if I have very bad belly pain or a yellow color of my eyes and skin.  Where can I learn more?   Centers for Disease Control and Prevention ? National Center for Infectious Diseases  http://www.cdc.gov/mayda-barr/index.html   Family Doctor  https://familydoctor.org/condition/mononucleosis/   KidsHealth  http://kidshealth.org/teen/infections/common/mononucleosis.html   National Health Service  https://www.nhs.uk/conditions/glandular-fever/   Last Reviewed Date   2020-04-08  Consumer Information Use and Disclaimer   This information is not specific medical advice and does not replace information you receive from your health care provider. This is only a brief summary of general information. It does NOT include all information about conditions, illnesses, injuries, tests, procedures, treatments, therapies, discharge instructions or life-style choices that may apply to you. You must talk with your health care provider for complete information about your health and treatment options. This information should not be used to decide whether or not to accept your health care providers advice,  instructions or recommendations. Only your health care provider has the knowledge and training to provide advice that is right for you.  Copyright   Copyright © 2021 [employer] and its affiliates and/or licensors. All rights reserved.

## 2022-01-27 ENCOUNTER — LAB VISIT (OUTPATIENT)
Dept: LAB | Facility: HOSPITAL | Age: 57
End: 2022-01-27
Attending: INTERNAL MEDICINE
Payer: COMMERCIAL

## 2022-01-27 ENCOUNTER — TELEPHONE (OUTPATIENT)
Dept: PRIMARY CARE CLINIC | Facility: CLINIC | Age: 57
End: 2022-01-27
Payer: COMMERCIAL

## 2022-01-27 DIAGNOSIS — R35.0 URINARY FREQUENCY: ICD-10-CM

## 2022-01-27 DIAGNOSIS — R82.90 ABNORMAL URINE ODOR: ICD-10-CM

## 2022-01-27 DIAGNOSIS — R82.90 ABNORMAL URINE ODOR: Primary | ICD-10-CM

## 2022-01-27 DIAGNOSIS — R39.15 URINARY URGENCY: ICD-10-CM

## 2022-01-27 LAB
BACTERIA #/AREA URNS AUTO: ABNORMAL /HPF
BILIRUB UR QL STRIP: NEGATIVE
CLARITY UR REFRACT.AUTO: ABNORMAL
COLOR UR AUTO: YELLOW
GLUCOSE UR QL STRIP: NEGATIVE
HGB UR QL STRIP: ABNORMAL
KETONES UR QL STRIP: NEGATIVE
LEUKOCYTE ESTERASE UR QL STRIP: ABNORMAL
MICROSCOPIC COMMENT: ABNORMAL
NITRITE UR QL STRIP: POSITIVE
PH UR STRIP: 6 [PH] (ref 5–8)
PROT UR QL STRIP: NEGATIVE
RBC #/AREA URNS AUTO: 3 /HPF (ref 0–4)
SP GR UR STRIP: 1.01 (ref 1–1.03)
SQUAMOUS #/AREA URNS AUTO: 9 /HPF
URN SPEC COLLECT METH UR: ABNORMAL
WBC #/AREA URNS AUTO: 93 /HPF (ref 0–5)

## 2022-01-27 PROCEDURE — 87077 CULTURE AEROBIC IDENTIFY: CPT | Performed by: INTERNAL MEDICINE

## 2022-01-27 PROCEDURE — 87088 URINE BACTERIA CULTURE: CPT | Performed by: INTERNAL MEDICINE

## 2022-01-27 PROCEDURE — 87186 SC STD MICRODIL/AGAR DIL: CPT | Performed by: INTERNAL MEDICINE

## 2022-01-27 PROCEDURE — 87086 URINE CULTURE/COLONY COUNT: CPT | Performed by: INTERNAL MEDICINE

## 2022-01-27 PROCEDURE — 81001 URINALYSIS AUTO W/SCOPE: CPT | Performed by: INTERNAL MEDICINE

## 2022-01-27 NOTE — TELEPHONE ENCOUNTER
----- Message from Yuliana Martinez sent at 1/27/2022  8:12 AM CST -----  Contact: pt  Patient would like to get medical advice.  Symptoms (please be specific):  Bladder infection  How long have you had these symptoms: 2 days  Would you like a call back, or a response through your [facility] portal?: call  Pharmacy name and phone # (copy from chart):   [id number] - [place] - [address]  [place] [address]  Phone: [phone] Fax: [phone]       Comments:

## 2022-01-28 ENCOUNTER — TELEPHONE (OUTPATIENT)
Dept: PRIMARY CARE CLINIC | Facility: CLINIC | Age: 57
End: 2022-01-28
Payer: COMMERCIAL

## 2022-01-28 RX ORDER — SULFAMETHOXAZOLE AND TRIMETHOPRIM 800; 160 MG/1; MG/1
1 TABLET ORAL 2 TIMES DAILY
Qty: 6 TABLET | Refills: 0 | Status: SHIPPED | OUTPATIENT
Start: 2022-01-28 | End: 2022-01-31

## 2022-01-28 NOTE — TELEPHONE ENCOUNTER
Yes, I will tx her empirically with bactrim DS 1 po BID x 3 days    Dr. CHAMPION   You can access the FollowMyHealth Patient Portal offered by [facility] by registering at the following website: [email or web address]. By joining [employer]’s FollowMyHealth portal, you will also be able to view your health information using other applications (apps) compatible with our system.

## 2022-01-28 NOTE — TELEPHONE ENCOUNTER
----- Message from Mickie Hayden sent at 1/28/2022  8:28 AM CST -----  Contact: [phone]  Patient called to follow up on Rx request for a bladder infection. Patient states she did a urinalysis yesterday. Please call and advise

## 2022-01-30 LAB — BACTERIA UR CULT: ABNORMAL

## 2022-01-31 ENCOUNTER — TELEPHONE (OUTPATIENT)
Dept: PRIMARY CARE CLINIC | Facility: CLINIC | Age: 57
End: 2022-01-31
Payer: COMMERCIAL

## 2022-01-31 NOTE — TELEPHONE ENCOUNTER
----- Message from Elvia Chacko MD sent at 1/31/2022  5:44 AM CST -----  I sent pt a my chart message -  I reviewed your Urine culture results and it appears consistent with a Klebsiella aerogenes UTI that is sensitive to the Bactrim I prescribed for you. Please inform me if your symptoms have not resolved.     Dr. CHAMPION

## 2022-01-31 NOTE — PROGRESS NOTES
I sent pt a my chart message -  I reviewed your Urine culture results and it appears consistent with a Klebsiella aerogenes UTI that is sensitive to the Bactrim I prescribed for you. Please inform me if your symptoms have not resolved.     Dr. CHAMPION

## 2022-02-19 ENCOUNTER — NURSE TRIAGE (OUTPATIENT)
Dept: ADMINISTRATIVE | Facility: CLINIC | Age: 57
End: 2022-02-19
Payer: COMMERCIAL

## 2022-02-19 DIAGNOSIS — N32.89 BLADDER SPASM: Primary | ICD-10-CM

## 2022-02-19 DIAGNOSIS — R35.0 URINARY FREQUENCY: ICD-10-CM

## 2022-02-19 NOTE — TELEPHONE ENCOUNTER
Pt reports urinary frequency. Advised, per protocol. Verbalizes understanding. May utilize [facility] but would like follow up with office in this regard.     Reason for Disposition   Urinating more frequently than usual (i.e., frequency)    Additional Information   Negative: Shock suspected (e.g., cold/pale/clammy skin, too weak to stand, low BP, rapid pulse)   Negative: Sounds like a life-threatening emergency to the triager   Negative: [1] Unable to urinate (or only a few drops) > 4 hours AND [2] bladder feels very full (e.g., palpable bladder or strong urge to urinate)   Negative: [1] Decreased urination and [2] drinking very little AND [2] dehydration suspected (e.g., dark urine, no urine > 12 hours, very dry mouth, very lightheaded)   Negative: Patient sounds very sick or weak to the triager   Negative: Fever > 100.4 F (38.0 C)    Protocols used: URINARY SYMPTOMS-A-AH

## 2022-02-21 ENCOUNTER — TELEPHONE (OUTPATIENT)
Dept: PRIMARY CARE CLINIC | Facility: CLINIC | Age: 57
End: 2022-02-21
Payer: COMMERCIAL

## 2022-02-21 ENCOUNTER — LAB VISIT (OUTPATIENT)
Dept: LAB | Facility: HOSPITAL | Age: 57
End: 2022-02-21
Attending: INTERNAL MEDICINE
Payer: COMMERCIAL

## 2022-02-21 DIAGNOSIS — R35.0 URINARY FREQUENCY: ICD-10-CM

## 2022-02-21 DIAGNOSIS — N32.89 BLADDER SPASM: ICD-10-CM

## 2022-02-21 LAB
BILIRUB UR QL STRIP: NEGATIVE
CLARITY UR REFRACT.AUTO: CLEAR
COLOR UR AUTO: YELLOW
GLUCOSE UR QL STRIP: NEGATIVE
HGB UR QL STRIP: ABNORMAL
KETONES UR QL STRIP: NEGATIVE
LEUKOCYTE ESTERASE UR QL STRIP: ABNORMAL
MICROSCOPIC COMMENT: ABNORMAL
NITRITE UR QL STRIP: NEGATIVE
PH UR STRIP: 7 [PH] (ref 5–8)
PROT UR QL STRIP: NEGATIVE
RBC #/AREA URNS AUTO: 1 /HPF (ref 0–4)
SP GR UR STRIP: 1 (ref 1–1.03)
SQUAMOUS #/AREA URNS AUTO: 3 /HPF
URN SPEC COLLECT METH UR: ABNORMAL
WBC #/AREA URNS AUTO: 6 /HPF (ref 0–5)

## 2022-02-21 PROCEDURE — 81001 URINALYSIS AUTO W/SCOPE: CPT | Performed by: INTERNAL MEDICINE

## 2022-02-21 RX ORDER — SULFAMETHOXAZOLE AND TRIMETHOPRIM 800; 160 MG/1; MG/1
1 TABLET ORAL 2 TIMES DAILY
Qty: 6 TABLET | Refills: 0 | Status: SHIPPED | OUTPATIENT
Start: 2022-02-21 | End: 2022-02-24 | Stop reason: SDUPTHER

## 2022-02-21 NOTE — TELEPHONE ENCOUNTER
----- Message from Arlin Joshua sent at 2/21/2022  4:28 PM CST -----  Contact: [phone]  Pt called to get a status update on her UA results and to inquire if something was called into the pharmacy for her. Please Advise       [id number] - [place] - [address]  [address] [place] [address]  Phone: [phone] Fax: [phone]

## 2022-02-21 NOTE — TELEPHONE ENCOUNTER
I sw pt. She did her u/a this morning and the results are still pending. Pt would like to know if you can send her an abx to start. C/o spasms and inc freq. Note: pt was rx bactrim around 1/27 for uti as well

## 2022-02-21 NOTE — TELEPHONE ENCOUNTER
----- Message from Ainsley May sent at 2/21/2022 10:42 AM CST -----  Contact: [phone] Patient  Pt states she is now using the   [id number]  [place] - [address] [place] [address]  Phone: [phone] Fax: [phone]  for her Rxs. Please call and advise.

## 2022-02-21 NOTE — PROGRESS NOTES
I sent pt a my chart message -  I reviewed your urine analysis. It did show some leukocytes and while it does show occult blood there is actually no significant blood in your urine as there is not an abnormal number of red blood cells (RBC) in your urine. There are no nitrites so this may not be a Urinary tract infection. Because there are no nitrites this will not reflex to a urine culture.  I will treat you with bactrim again and you can take some OTC Azo if needed.   If your symptoms persist you must alert me.     Dr. CHAMPION

## 2022-02-22 ENCOUNTER — TELEPHONE (OUTPATIENT)
Dept: PRIMARY CARE CLINIC | Facility: CLINIC | Age: 57
End: 2022-02-22
Payer: COMMERCIAL

## 2022-02-22 NOTE — TELEPHONE ENCOUNTER
----- Message from Elvia Chacko MD sent at 2/21/2022  5:19 PM CST -----  I sent pt a my chart message -  I reviewed your urine analysis. It did show some leukocytes and while it does show occult blood there is actually no significant blood in your urine as there is not an abnormal number of red blood cells (RBC) in your urine. There are no nitrites so this may not be a Urinary tract infection. Because there are no nitrites this will not reflex to a urine culture.  I will treat you with bactrim again and you can take some OTC Azo if needed.   If your symptoms persist you must alert me.     Dr. CHAMPION

## 2022-02-24 RX ORDER — SULFAMETHOXAZOLE AND TRIMETHOPRIM 800; 160 MG/1; MG/1
1 TABLET ORAL 2 TIMES DAILY
Qty: 6 TABLET | Refills: 0 | Status: SHIPPED | OUTPATIENT
Start: 2022-02-24 | End: 2022-02-27

## 2022-02-24 NOTE — TELEPHONE ENCOUNTER
Pt was given a 3 day supply of Bactrim on 2/21/22. Pt states she is feeling better but still having very minimal sxs remaining. She would like to know if we can extend the abx a few more days

## 2022-02-24 NOTE — TELEPHONE ENCOUNTER
----- Message from Yuliana Martinez sent at 2/24/2022 11:06 AM CST -----  Contact: pt  Requesting an RX refill or new RX.  Is this a refill or new RX: refill  RX name and strength (copy/paste from chart): sulfamethoxazole-trimethoprim 800-160mg (BACTRIM DS) 800-160 mg    Is this a 30 day or 90 day RX:   Pharmacy name and phone # (copy/paste from chart):   [facility] [address] [place] [address] [address] [facility]  [address] [facility] [address]  Phone: [phone] Fax: [phone]       The doctors have asked that we provide their patients with the following 2 reminders -- prescription refills can take up to 72 hours, and a friendly reminder that in the future you can use your [facility] account to request refills:no      Pt is calling to extend this medication.Please advise

## 2022-03-03 ENCOUNTER — TELEPHONE (OUTPATIENT)
Dept: OBSTETRICS AND GYNECOLOGY | Facility: CLINIC | Age: 57
End: 2022-03-03
Payer: COMMERCIAL

## 2022-03-03 DIAGNOSIS — Z12.31 VISIT FOR SCREENING MAMMOGRAM: Primary | ICD-10-CM

## 2022-03-22 ENCOUNTER — OFFICE VISIT (OUTPATIENT)
Dept: OBSTETRICS AND GYNECOLOGY | Facility: CLINIC | Age: 57
End: 2022-03-22
Attending: OBSTETRICS & GYNECOLOGY
Payer: COMMERCIAL

## 2022-03-22 VITALS
HEIGHT: 62 IN | WEIGHT: 134.5 LBS | DIASTOLIC BLOOD PRESSURE: 90 MMHG | BODY MASS INDEX: 24.75 KG/M2 | SYSTOLIC BLOOD PRESSURE: 140 MMHG

## 2022-03-22 DIAGNOSIS — N95.1 MENOPAUSAL STATE: ICD-10-CM

## 2022-03-22 DIAGNOSIS — Z12.31 VISIT FOR SCREENING MAMMOGRAM: Primary | ICD-10-CM

## 2022-03-22 DIAGNOSIS — Z12.4 PAP SMEAR FOR CERVICAL CANCER SCREENING: ICD-10-CM

## 2022-03-22 PROCEDURE — 99396 PR PREVENTIVE VISIT,EST,40-64: ICD-10-PCS | Mod: S$GLB,,, | Performed by: OBSTETRICS & GYNECOLOGY

## 2022-03-22 PROCEDURE — 3080F DIAST BP >= 90 MM HG: CPT | Mod: CPTII,S$GLB,, | Performed by: OBSTETRICS & GYNECOLOGY

## 2022-03-22 PROCEDURE — 3008F BODY MASS INDEX DOCD: CPT | Mod: CPTII,S$GLB,, | Performed by: OBSTETRICS & GYNECOLOGY

## 2022-03-22 PROCEDURE — 88175 CYTOPATH C/V AUTO FLUID REDO: CPT | Performed by: OBSTETRICS & GYNECOLOGY

## 2022-03-22 PROCEDURE — 4010F ACE/ARB THERAPY RXD/TAKEN: CPT | Mod: CPTII,S$GLB,, | Performed by: OBSTETRICS & GYNECOLOGY

## 2022-03-22 PROCEDURE — 1159F MED LIST DOCD IN RCRD: CPT | Mod: CPTII,S$GLB,, | Performed by: OBSTETRICS & GYNECOLOGY

## 2022-03-22 PROCEDURE — 4010F PR ACE/ARB THEARPY RXD/TAKEN: ICD-10-PCS | Mod: CPTII,S$GLB,, | Performed by: OBSTETRICS & GYNECOLOGY

## 2022-03-22 PROCEDURE — 99999 PR PBB SHADOW E&M-EST. PATIENT-LVL III: CPT | Mod: PBBFAC,,, | Performed by: OBSTETRICS & GYNECOLOGY

## 2022-03-22 PROCEDURE — 3077F SYST BP >= 140 MM HG: CPT | Mod: CPTII,S$GLB,, | Performed by: OBSTETRICS & GYNECOLOGY

## 2022-03-22 PROCEDURE — 3008F PR BODY MASS INDEX (BMI) DOCUMENTED: ICD-10-PCS | Mod: CPTII,S$GLB,, | Performed by: OBSTETRICS & GYNECOLOGY

## 2022-03-22 PROCEDURE — 87624 HPV HI-RISK TYP POOLED RSLT: CPT | Performed by: OBSTETRICS & GYNECOLOGY

## 2022-03-22 PROCEDURE — 3080F PR MOST RECENT DIASTOLIC BLOOD PRESSURE >= 90 MM HG: ICD-10-PCS | Mod: CPTII,S$GLB,, | Performed by: OBSTETRICS & GYNECOLOGY

## 2022-03-22 PROCEDURE — 99999 PR PBB SHADOW E&M-EST. PATIENT-LVL III: ICD-10-PCS | Mod: PBBFAC,,, | Performed by: OBSTETRICS & GYNECOLOGY

## 2022-03-22 PROCEDURE — 1159F PR MEDICATION LIST DOCUMENTED IN MEDICAL RECORD: ICD-10-PCS | Mod: CPTII,S$GLB,, | Performed by: OBSTETRICS & GYNECOLOGY

## 2022-03-22 PROCEDURE — 3077F PR MOST RECENT SYSTOLIC BLOOD PRESSURE >= 140 MM HG: ICD-10-PCS | Mod: CPTII,S$GLB,, | Performed by: OBSTETRICS & GYNECOLOGY

## 2022-03-22 PROCEDURE — 99396 PREV VISIT EST AGE 40-64: CPT | Mod: S$GLB,,, | Performed by: OBSTETRICS & GYNECOLOGY

## 2022-03-22 RX ORDER — ESTRADIOL 1 MG/1
1 TABLET ORAL DAILY
Qty: 90 TABLET | Refills: 3 | Status: SHIPPED | OUTPATIENT
Start: 2022-03-22 | End: 2022-10-26

## 2022-03-22 NOTE — PROGRESS NOTES
Subjective:       Patient ID: Padmini Pollock is a 57 y.o. female.    Chief Complaint:  WWE    History of Present Illness  57 year old with supracervical hysterectomy doing well with estradiol and will add sublingual progesterone.  Struggles with sleep.  No vaginal bleeding or pelvic pain.  No breast concerns.  Reports moods are good.    Needs bone density      GYN & OB History  No LMP recorded. Patient has had a hysterectomy.   Date of Last Pap: No result found    OB History    Para Term  AB Living   2 2 2     2   SAB IAB Ectopic Multiple Live Births           2      # Outcome Date GA Lbr Ian/2nd Weight Sex Delivery Anes PTL Lv   2 Term 08/10/00 39w0d   M CS-LTranv Spinal N MIKE      Complications: Previous  section   1 Term 89 39w0d   F CS-LTranv Spinal N MIKE       Past Medical History:   Diagnosis Date    Depression     Endometriosis     Family history of breast cancer in mother     Hx of migraine headaches     Hyperlipidemia     Hyperlipidemia 2021    Hypertension 2021    Menopause     Prolapsing mitral leaflet syndrome        Past Surgical History:   Procedure Laterality Date     SECTION      x 2    COLONOSCOPY  2016    Normal     HYSTERECTOMY  2011    Supra Cervical w/ Dr Jamie Mayorga (Outpt. facility possibly [facility])       Review of Systems  Review of Systems   Constitutional: Negative for fatigue.   Respiratory: Negative for shortness of breath.    Cardiovascular: Negative for chest pain.   Gastrointestinal: Negative for abdominal pain, constipation, diarrhea and nausea.   Endocrine: Negative for heat intolerance.   Genitourinary: Negative for dyspareunia, dysuria, menstrual problem, pelvic pain and vaginal bleeding.   Musculoskeletal: Negative for back pain.   Skin: Negative for rash.   Neurological: Negative for headaches.   Hematological: Negative for adenopathy.   Psychiatric/Behavioral: Positive for sleep disturbance. Negative for dysphoric mood.  The patient is not nervous/anxious.         Objective:   Physical Exam:   Constitutional: She is oriented to person, place, and time. She appears well-developed and well-nourished.      Neck: No thyromegaly present.    Cardiovascular: Normal rate.     Pulmonary/Chest: Effort normal and breath sounds normal. Right breast exhibits no mass, no nipple discharge, no skin change, no tenderness and no bleeding. Left breast exhibits no mass, no nipple discharge, no skin change, no tenderness and no bleeding.        Abdominal: Soft. Bowel sounds are normal. She exhibits no distension and no mass. There is no abdominal tenderness. There is no rebound and no guarding.     Genitourinary:    Vagina normal.      Pelvic exam was performed with patient supine.   There is no rash, tenderness, lesion or injury on the right labia. There is no rash, tenderness, lesion or injury on the left labia. Cervix is normal. Right adnexum displays no mass, no tenderness and no fullness. Left adnexum displays no mass, no tenderness and no fullness. No erythema,  no vaginal discharge, tenderness, rectocele, cystocele or unspecified prolapse of vaginal walls in the vagina.    No signs of injury in the vagina.   Cervix exhibits no motion tenderness, no discharge and no friability.           Musculoskeletal: Normal range of motion and moves all extremeties.      Lymphadenopathy:        Right: No supraclavicular adenopathy present.        Left: No supraclavicular adenopathy present.    Neurological: She is alert and oriented to person, place, and time.    Skin: Skin is warm and dry.    Psychiatric: She has a normal mood and affect. Her behavior is normal. Judgment normal.        Assessment/ Plan:     Visit for screening mammogram  -     Mammo Digital Screening Martha w/ [facility]; Future; Expected date: 03/22/2022    Pap smear for cervical cancer screening  -     Liquid-Based Pap Smear, Screening  -     HPV High Risk Genotypes, PCR    Menopausal state  -      estradioL (ESTRACE) 1 MG tablet; Take 1 tablet (1 mg total) by mouth once daily.  Dispense: 90 tablet; Refill: 3         No follow-ups on file.    Patient was counseled today on A.C.S. Pap guidelines and recommendations for yearly pelvic exams, mammograms and monthly self breast exams; to see her PCP for other health maintenance.

## 2022-03-25 ENCOUNTER — TELEPHONE (OUTPATIENT)
Dept: OBSTETRICS AND GYNECOLOGY | Facility: CLINIC | Age: 57
End: 2022-03-25
Payer: COMMERCIAL

## 2022-03-25 NOTE — LETTER
March 25, 2022      [facility]  [id number]  [place] [address]  Phone: [phone]  Fax: [phone]       Patient: Padmini Pollock   YOB: 1965  Date of Visit: 03/25/2022    To Whom It May Concern:    Homer Pollock  was at [facility] on 03/22/22 . If you have any questions or concerns, or if I can be of further assistance, please do not hesitate to contact me.    Sincerely,      Connie Ndiaye MD

## 2022-03-28 LAB
CLINICAL INFO: NORMAL
CYTO CVX: NORMAL
CYTOLOGIST CVX/VAG CYTO: NORMAL
CYTOLOGIST CVX/VAG CYTO: NORMAL
CYTOLOGY CMNT CVX/VAG CYTO-IMP: NORMAL
CYTOLOGY PAP THIN PREP EXPLANATION: NORMAL
DATE OF PREVIOUS PAP: NORMAL
DATE PREVIOUS BX: NO
GEN CATEG CVX/VAG CYTO-IMP: NORMAL
HPV E6+E7 MRNA CVX QL NAA+PROBE: NOT DETECTED
LMP START DATE: NORMAL
MICROORGANISM CVX/VAG CYTO: NORMAL
PATHOLOGIST CVX/VAG CYTO: NORMAL
SERVICE CMNT-IMP: NORMAL
SPECIMEN SOURCE CVX/VAG CYTO: NORMAL
STAT OF ADQ CVX/VAG CYTO-IMP: NORMAL

## 2022-04-25 ENCOUNTER — PATIENT MESSAGE (OUTPATIENT)
Dept: PRIMARY CARE CLINIC | Facility: CLINIC | Age: 57
End: 2022-04-25
Payer: COMMERCIAL

## 2022-04-25 ENCOUNTER — LAB VISIT (OUTPATIENT)
Dept: LAB | Facility: HOSPITAL | Age: 57
End: 2022-04-25
Attending: INTERNAL MEDICINE
Payer: COMMERCIAL

## 2022-04-25 DIAGNOSIS — R39.15 URGENCY OF URINATION: ICD-10-CM

## 2022-04-25 DIAGNOSIS — R30.0 DYSURIA: Primary | ICD-10-CM

## 2022-04-25 DIAGNOSIS — R30.0 DYSURIA: ICD-10-CM

## 2022-04-25 PROCEDURE — 81001 URINALYSIS AUTO W/SCOPE: CPT | Performed by: INTERNAL MEDICINE

## 2022-04-25 PROCEDURE — 87086 URINE CULTURE/COLONY COUNT: CPT | Performed by: INTERNAL MEDICINE

## 2022-04-26 ENCOUNTER — TELEPHONE (OUTPATIENT)
Dept: PRIMARY CARE CLINIC | Facility: CLINIC | Age: 57
End: 2022-04-26
Payer: COMMERCIAL

## 2022-04-26 DIAGNOSIS — N30.00 ACUTE CYSTITIS WITHOUT HEMATURIA: Primary | ICD-10-CM

## 2022-04-26 LAB
BACTERIA #/AREA URNS AUTO: ABNORMAL /HPF
BILIRUB UR QL STRIP: NEGATIVE
CLARITY UR REFRACT.AUTO: ABNORMAL
COLOR UR AUTO: YELLOW
GLUCOSE UR QL STRIP: NEGATIVE
HGB UR QL STRIP: NEGATIVE
KETONES UR QL STRIP: NEGATIVE
LEUKOCYTE ESTERASE UR QL STRIP: ABNORMAL
MICROSCOPIC COMMENT: ABNORMAL
NITRITE UR QL STRIP: NEGATIVE
NON-SQ EPI CELLS #/AREA URNS AUTO: 1 /HPF
PH UR STRIP: 6 [PH] (ref 5–8)
PROT UR QL STRIP: NEGATIVE
RBC #/AREA URNS AUTO: 4 /HPF (ref 0–4)
SP GR UR STRIP: 1.02 (ref 1–1.03)
SQUAMOUS #/AREA URNS AUTO: 3 /HPF
URN SPEC COLLECT METH UR: ABNORMAL
WBC #/AREA URNS AUTO: >100 /HPF (ref 0–5)
WBC CLUMPS UR QL AUTO: ABNORMAL

## 2022-04-26 RX ORDER — SULFAMETHOXAZOLE AND TRIMETHOPRIM 800; 160 MG/1; MG/1
1 TABLET ORAL 2 TIMES DAILY
Qty: 6 TABLET | Refills: 0 | Status: SHIPPED | OUTPATIENT
Start: 2022-04-26 | End: 2022-04-29

## 2022-04-26 NOTE — TELEPHONE ENCOUNTER
LVM for patient regarding provider message.  If you have additional questions, our call back is [phone].

## 2022-04-26 NOTE — TELEPHONE ENCOUNTER
Positive u/a with culture pending. Pt c/o urgency and a little burning. Do you want to prescribe abx?

## 2022-04-26 NOTE — TELEPHONE ENCOUNTER
----- Message from Yvrose Moreno sent at 4/26/2022  3:02 PM CDT -----  Contact: [phone]  Pt is calling for her urine results please advise and give return call

## 2022-04-26 NOTE — PROGRESS NOTES
I sent pt a my chart message - Sent empiric Abx  (Bactrim) in the meantime.   I saw your message and sent antibiotics to the pharmacy for you while we await your urine culture results.     Dr. CHAMPION

## 2022-04-27 LAB — BACTERIA UR CULT: NO GROWTH

## 2022-04-28 NOTE — PROGRESS NOTES
I sent pt a my chart message -  I reviewed your urine culture which was negative. How are you feeling?    Dr. CHAMPION

## 2022-04-29 ENCOUNTER — TELEPHONE (OUTPATIENT)
Dept: PRIMARY CARE CLINIC | Facility: CLINIC | Age: 57
End: 2022-04-29
Payer: COMMERCIAL

## 2022-04-29 NOTE — TELEPHONE ENCOUNTER
----- Message from Elvia Chacko MD sent at 4/28/2022  5:50 PM CDT -----  I sent pt a my chart message -  I reviewed your urine culture which was negative. How are you feeling?    Dr. CHAMPION

## 2022-05-11 ENCOUNTER — TELEPHONE (OUTPATIENT)
Dept: OBSTETRICS AND GYNECOLOGY | Facility: CLINIC | Age: 57
End: 2022-05-11
Payer: COMMERCIAL

## 2022-05-11 ENCOUNTER — OFFICE VISIT (OUTPATIENT)
Dept: PRIMARY CARE CLINIC | Facility: CLINIC | Age: 57
End: 2022-05-11
Payer: COMMERCIAL

## 2022-05-11 ENCOUNTER — PATIENT MESSAGE (OUTPATIENT)
Dept: PRIMARY CARE CLINIC | Facility: CLINIC | Age: 57
End: 2022-05-11

## 2022-05-11 ENCOUNTER — TELEPHONE (OUTPATIENT)
Dept: PRIMARY CARE CLINIC | Facility: CLINIC | Age: 57
End: 2022-05-11
Payer: COMMERCIAL

## 2022-05-11 DIAGNOSIS — N95.0 POSTMENOPAUSAL BLEEDING: ICD-10-CM

## 2022-05-11 DIAGNOSIS — R39.15 URINARY URGENCY: Primary | ICD-10-CM

## 2022-05-11 DIAGNOSIS — R39.9 URINARY TRACT INFECTION SYMPTOMS: Primary | ICD-10-CM

## 2022-05-11 DIAGNOSIS — Z72.51 HIGH RISK SEXUAL BEHAVIOR, UNSPECIFIED TYPE: ICD-10-CM

## 2022-05-11 PROCEDURE — 99443 PR PHYSICIAN TELEPHONE EVALUATION 21-30 MIN: CPT | Mod: 95,,, | Performed by: INTERNAL MEDICINE

## 2022-05-11 PROCEDURE — 4010F ACE/ARB THERAPY RXD/TAKEN: CPT | Mod: CPTII,95,, | Performed by: INTERNAL MEDICINE

## 2022-05-11 PROCEDURE — 99443 PR PHYSICIAN TELEPHONE EVALUATION 21-30 MIN: ICD-10-PCS | Mod: 95,,, | Performed by: INTERNAL MEDICINE

## 2022-05-11 PROCEDURE — 4010F PR ACE/ARB THEARPY RXD/TAKEN: ICD-10-PCS | Mod: CPTII,95,, | Performed by: INTERNAL MEDICINE

## 2022-05-11 NOTE — TELEPHONE ENCOUNTER
lvm requesting pt contact the office to schedule her abd u/s that  ordered during their virtual visit.

## 2022-05-11 NOTE — PROGRESS NOTES
"[facility] Note    Chief Complaint    No chief complaint on file.      History of Present Illness      Padmini Pollock is a 57 y.o. F with Heptic cyst, Simple Renal cyst, GERD, HLD, Postmenopausal on HRT, HTN, Colon Polyps presents to fu  urinary sx's x 2 days and 1 episode of brown vaginal d/c. Referred by Mom Christianen . Last visit - 1/12/22.    The patient location is: "work"  The chief complaint leading to consultation is: "Fu urinary sx's x 2 days and 1 episode of brown vaginal d/c"    Visit type: audio only    Face to Face time with patient: 21 minutes  21 minutes of total time spent on the encounter, which includes face to face time and non-face to face time preparing to see the patient (eg, review of tests), Obtaining and/or reviewing separately obtained history, Documenting clinical information in the electronic or other health record, Independently interpreting results (not separately reported) and communicating results to the patient/family/caregiver, or Care coordination (not separately reported).         Each patient to whom he or she provides medical services by telemedicine is:  (1) informed of the relationship between the physician and patient and the respective role of any other health care provider with respect to management of the patient; and (2) notified that he or she may decline to receive medical services by telemedicine and may withdraw from such care at any time.    Notes:     Urinary Symptoms - She reports urinary Sx's that started as an irritation that feels like spasms x 2 days. She reports discomfort but not when urinating. She reports one episode of brown discharge/spotting. She is s/p partial Hys. She is on HRT. Rec she fu with OB. Pt has had Urinary Sx's 4 times since Jan. Prev h/o Recurrent UTI when marrried in yrs past. Pt newly sexually active with 1 new male partner. Not using condoms.      COVID + 12/27/21 - Still with lingering cough.  Fevers have subsided. " "Promethazine DM is helping with cough. Will Rx Albuterol prn.      Hepatic cyst - Will repeat Abd U/S.     Simple Renal cyst -  Await old records to review Will repeat abd U/S.       GERD - Rec reflux prec. Rec pepcid OTC prn.      HLD - Pt on Pravastatin 20 mg QHS. She just had labs with [facility], Dr. Dobson. Will obtain a copy for review      Postmenopausal on HRT - Pt on estrace per OB. Plans to fu due to spotting.      HTN - Pt on Atenolol  25 mg QHS and Losartan 25 mg 1/2 tab po QHS.  Wt loss - down 12 lb since June with diet.     Colon Polyps - due for repeat C-scope with Dr. Mayorga. Pt plans to set up.      Cough variant asthma - saw Dr. Kovacs in past. She is not on an inhaler - will refill given recent COVID and continued cough.   She reports PFT's were "borderline".      HCM - Flu - 9/2020 - due;  Tdap -1/26/19;  PCV 13 - none;  PVX 23 - none;  Shingrix - none;  COVID - 19 Vaccine (Pfizer)  #1 2/24/21; #2 3/17/21; and # 3 - due; MGM -  3/2021 - at [facility];  DEXA - none;  PAP - 1/30/18 - neg; Hep C Screen - 11/3/21 - neg.;  HIV Screen -- 11/3/21 - neg.; C-scope - 6/20/16 - ulceration - repeat 5 yrs - due;    Prev PCP - none;  Ob/GYN - Dr. Ndiaye; Cards - Dr. Dobson; GI - Dr. Mayorga; Pulm - Dr. Kovacs; Well visit - 6/30/21    Patient Care Team:  Elvia Chacko MD as PCP - General (Internal Medicine)  Kenyatta Ramirez MA as Care Coordinator  Jose Roberto Mayorga MD as Consulting Physician (Gastroenterology)     Health Maintenance:  Immunization History   Administered Date(s) Administered    Influenza - Quadrivalent - MDCK - PF 10/06/2018, 11/14/2019    Influenza - Quadrivalent - PF *Preferred* (6 months and older) 09/25/2020    Influenza - Trivalent (ADULT) 09/11/2015    Tdap 01/26/2019      Health Maintenance   Topic Date Due    Mammogram  03/08/2023    Lipid Panel  11/03/2026    TETANUS VACCINE  01/26/2029    Hepatitis C Screening  Completed        Past Medical History:  Past Medical History: "   Diagnosis Date    Depression     Endometriosis     Family history of breast cancer in mother     Hx of migraine headaches     Hyperlipidemia     Hyperlipidemia 2021    Hypertension 2021    Menopause     Prolapsing mitral leaflet syndrome        Past Surgical History:   has a past surgical history that includes  section; Hysterectomy (2011); and Colonoscopy ().    Family History:  family history includes Breast cancer in her mother; Heart attack in her father; Heart disease (age of onset: 65) in her father; Hypertension in her father and mother; No Known Problems in her sister.     Social History:  Social History     Tobacco Use    Smoking status: Never Smoker    Smokeless tobacco: Never Used   Substance Use Topics    Alcohol use: Yes     Comment: rare - once every 6 mos    Drug use: Never       Review of Systems   Constitutional: Negative for chills, diaphoresis and fever.   Genitourinary: Positive for dysuria, vaginal bleeding and vaginal discharge. Negative for bladder incontinence, frequency, hematuria and nocturia.        Medications:    Current Outpatient Medications:     atenoloL (TENORMIN) 25 MG tablet, TAKE 1 TABLET BY MOUTH EVERY DAY, Disp: 90 tablet, Rfl: 3    estradioL (ESTRACE) 1 MG tablet, Take 1 tablet (1 mg total) by mouth once daily., Disp: 90 tablet, Rfl: 3    losartan (COZAAR) 25 MG tablet, Take 1 tablet (25 mg total) by mouth once daily., Disp: 90 tablet, Rfl: 1    pravastatin (PRAVACHOL) 20 MG tablet, Take 1 tablet (20 mg total) by mouth once daily., Disp: 90 tablet, Rfl: 1     Allergies:  Review of patient's allergies indicates:  No Known Allergies    Physical Exam                    Physical Exam     Laboratory:  CBC:  Recent Labs   Lab 21  0000   WBC 7.1   RBC 4.27   Hemoglobin 13.8   Hematocrit 40.6   Platelets 337   MCV 95.1   MCH 32.2   MCHC 33.9       CMP:  Recent Labs   Lab 21  0000   Glucose 92   Calcium 9.4   ALBUMIN 4.0   Total  Protein 7.0   Sodium 142   Potassium 4.3   CO2 26   Chloride 103   BUN 10   Creatinine 0.8   Alkaline Phosphatase 121   ALT 58 H   AST 56 H   Total Bilirubin 0.3       URINALYSIS:  Recent Labs   Lab 04/25/22  1535   Color, UA Yellow   Specific Gravity, UA 1.020   pH, UA 6.0   Protein, UA Negative   Bacteria Moderate A   Nitrite, UA Negative   Leukocytes, UA 2+ A        LIPIDS:  Recent Labs   Lab 11/03/21  0000   TSH 1.90   HDL 50   Cholesterol 203 H   Triglycerides 142   LDL Calculated 130 H   Non-HDL Cholesterol 153 H       TSH:  Recent Labs   Lab 11/03/21  0000   TSH 1.90         Recent Labs   Lab 11/03/21  0000   Hepatitis C Ab NON-REACTIVE       Assessment/Plan     Padmini Pollock is a 57 y.o.female with:    Urinary tract infection symptoms  -     URINALYSIS; Future; Expected date: 05/11/2022  -     Urine culture; Future; Expected date: 05/11/2022  -     US Abdomen Complete; Future; Expected date: 05/11/2022  - Will check UA and Ucx and tx if positive. Will check Abd U/S. Could be atrophic vaginitis vs possible IC.     Postmenopausal bleeding  -  Rec fu with Ob for exam.  Could be related to her HRT or due to atrophic vaginitis. Note - She is s/p Hys.  Will r/o GC/Chlamydia as pt is newly sexually active.     High risk sexual behavior, unspecified type  -     C. trachomatis/N. gonorrhoeae by [facility]; Urine; Future; Expected date: 05/11/2022    Now by Edgardo  Chronic conditions status updated as per HPI.  Other than changes above, cont current medications and maintain follow up with specialists.    Follow up in about 7 weeks (around 7/1/2022) for well visit or sooner if needed.      Nicole Giambrone, MD [facility]

## 2022-05-11 NOTE — TELEPHONE ENCOUNTER
She needs to be seen. Will refer to Urology for recurrent UTI.  Can we add her virtually    Dr. CHAMPION

## 2022-05-11 NOTE — TELEPHONE ENCOUNTER
----- Message from Yvrose Moreno sent at 5/11/2022  1:54 PM CDT -----  Contact: [phone]  Pt is calling for Joy she states she has to tell you something in regards to the message she sent this morning

## 2022-05-11 NOTE — TELEPHONE ENCOUNTER
Pt has been suffering from recurring UTI's for the last several months.  Sometimes the cx would be neg but still having symptoms every month.  Reports intermittent bladder spasms and urgency.  Sometimes urine comes out and sometime no urine comes out.  Reports irritation and burning at the bottom of her vagina and internally.  Saw her PCP today but was told to f/u with OBGYN for possible vaginal dryness.  Pt mentioned she is sexually active after 11 years, uses lubricant with intercourse.    Please advise, thanks!

## 2022-05-12 ENCOUNTER — TELEPHONE (OUTPATIENT)
Dept: PRIMARY CARE CLINIC | Facility: CLINIC | Age: 57
End: 2022-05-12
Payer: COMMERCIAL

## 2022-05-12 ENCOUNTER — LAB VISIT (OUTPATIENT)
Dept: LAB | Facility: HOSPITAL | Age: 57
End: 2022-05-12
Attending: INTERNAL MEDICINE
Payer: COMMERCIAL

## 2022-05-12 DIAGNOSIS — R39.9 URINARY TRACT INFECTION SYMPTOMS: ICD-10-CM

## 2022-05-12 LAB
BACTERIA #/AREA URNS AUTO: ABNORMAL /HPF
BILIRUB UR QL STRIP: NEGATIVE
CLARITY UR REFRACT.AUTO: ABNORMAL
COLOR UR AUTO: ABNORMAL
GLUCOSE UR QL STRIP: NEGATIVE
HGB UR QL STRIP: NEGATIVE
KETONES UR QL STRIP: NEGATIVE
LEUKOCYTE ESTERASE UR QL STRIP: ABNORMAL
MICROSCOPIC COMMENT: ABNORMAL
NITRITE UR QL STRIP: NEGATIVE
PH UR STRIP: 7 [PH] (ref 5–8)
PROT UR QL STRIP: NEGATIVE
RBC #/AREA URNS AUTO: 0 /HPF (ref 0–4)
SP GR UR STRIP: 1 (ref 1–1.03)
SQUAMOUS #/AREA URNS AUTO: 2 /HPF
URN SPEC COLLECT METH UR: ABNORMAL
WBC #/AREA URNS AUTO: 25 /HPF (ref 0–5)

## 2022-05-12 PROCEDURE — 87088 URINE BACTERIA CULTURE: CPT | Performed by: INTERNAL MEDICINE

## 2022-05-12 PROCEDURE — 87077 CULTURE AEROBIC IDENTIFY: CPT | Performed by: INTERNAL MEDICINE

## 2022-05-12 PROCEDURE — 81001 URINALYSIS AUTO W/SCOPE: CPT | Performed by: INTERNAL MEDICINE

## 2022-05-12 PROCEDURE — 87186 SC STD MICRODIL/AGAR DIL: CPT | Performed by: INTERNAL MEDICINE

## 2022-05-12 PROCEDURE — 87086 URINE CULTURE/COLONY COUNT: CPT | Performed by: INTERNAL MEDICINE

## 2022-05-12 NOTE — TELEPHONE ENCOUNTER
----- Message from Yuliana Martinez sent at 5/12/2022  1:24 PM CDT -----  Contact: pt  Patient is returning a phone call.  Who left a message for the patient:Joy Soto   Does patient know what this is regarding:  yes  Would you like a call back, or a response through your [facility] portal?:  call  Comments:

## 2022-05-13 ENCOUNTER — PATIENT OUTREACH (OUTPATIENT)
Dept: ADMINISTRATIVE | Facility: OTHER | Age: 57
End: 2022-05-13
Payer: COMMERCIAL

## 2022-05-13 ENCOUNTER — TELEPHONE (OUTPATIENT)
Dept: PRIMARY CARE CLINIC | Facility: CLINIC | Age: 57
End: 2022-05-13
Payer: COMMERCIAL

## 2022-05-13 ENCOUNTER — TELEPHONE (OUTPATIENT)
Dept: OBSTETRICS AND GYNECOLOGY | Facility: CLINIC | Age: 57
End: 2022-05-13

## 2022-05-13 ENCOUNTER — HOSPITAL ENCOUNTER (OUTPATIENT)
Dept: RADIOLOGY | Facility: HOSPITAL | Age: 57
Discharge: HOME | End: 2022-05-13
Attending: INTERNAL MEDICINE
Payer: COMMERCIAL

## 2022-05-13 DIAGNOSIS — K76.0 FATTY LIVER: Primary | ICD-10-CM

## 2022-05-13 DIAGNOSIS — R39.9 URINARY TRACT INFECTION SYMPTOMS: ICD-10-CM

## 2022-05-13 DIAGNOSIS — K76.89 LIVER CYST: ICD-10-CM

## 2022-05-13 PROCEDURE — 76700 US EXAM ABDOM COMPLETE: CPT | Mod: TC

## 2022-05-13 PROCEDURE — 76700 US ABDOMEN COMPLETE: ICD-10-PCS | Mod: 26,,, | Performed by: INTERNAL MEDICINE

## 2022-05-13 PROCEDURE — 76700 US EXAM ABDOM COMPLETE: CPT | Mod: 26,,, | Performed by: INTERNAL MEDICINE

## 2022-05-13 NOTE — TELEPHONE ENCOUNTER
----- Message from Elvia Chacko MD sent at 5/13/2022 12:37 PM CDT -----  I sent pt a my chart message -  I reviewed your abdominal Ultrasound - Numerous simple and mildly complex hepatic cysts. +Fatty liver. I rec you limit tylenol and alcohol.  Rec diet and exercise for wt loss.  As discussed at visit there is a potential for cirrhosis. Here is a website with information about fatty liver and inflammation related to fatty liver (MELENDEZ) = www.nashtruth.BA Insight  AND www.NASHactually.com.  Will refer to hepatology since a 2.1 complex rather than simple liver cyst. +renal cysts - lgst of which is 4.3 cm.    Dr. CHAMPION

## 2022-05-13 NOTE — PROGRESS NOTES
I sent pt a my chart message -  I reviewed your abdominal Ultrasound - Numerous simple and mildly complex hepatic cysts. +Fatty liver. I rec you limit tylenol and alcohol.  Rec diet and exercise for wt loss.  As discussed at visit there is a potential for cirrhosis. Here is a website with information about fatty liver and inflammation related to fatty liver (MELENDEZ) = www.nashtruth.Entrada  AND www.NASHactually.Entrada.  Will refer to hepatology since a 2.1 complex rather than simple liver cyst. +renal cysts - lgst of which is 4.3 cm.    Dr. CHAMPION

## 2022-05-13 NOTE — PROGRESS NOTES
I d/w pt -  I reviewed your urine and it appears it could be a UTI. Await Urine culture and was going to start empiric Abx but she already  started bactrim she had  left over last night. She has a 3 day supply and will continue this.     Dr. CHAMPION

## 2022-05-15 LAB — BACTERIA UR CULT: ABNORMAL

## 2022-05-16 ENCOUNTER — TELEPHONE (OUTPATIENT)
Dept: PRIMARY CARE CLINIC | Facility: CLINIC | Age: 57
End: 2022-05-16
Payer: COMMERCIAL

## 2022-05-16 ENCOUNTER — PATIENT MESSAGE (OUTPATIENT)
Dept: PRIMARY CARE CLINIC | Facility: CLINIC | Age: 57
End: 2022-05-16
Payer: COMMERCIAL

## 2022-05-16 DIAGNOSIS — R39.9 URINARY TRACT INFECTION SYMPTOMS: Primary | ICD-10-CM

## 2022-05-16 RX ORDER — SULFAMETHOXAZOLE AND TRIMETHOPRIM 800; 160 MG/1; MG/1
1 TABLET ORAL 2 TIMES DAILY
Qty: 6 TABLET | Refills: 0 | Status: SHIPPED | OUTPATIENT
Start: 2022-05-16 | End: 2022-05-19

## 2022-05-16 NOTE — PROGRESS NOTES
I sent pt a my chart message -  I reviewed your Urine culture which was consistent with a Klebsiella Urinary tract infection which is sensitive to the Bactrim you are taking.  Let me know if you are still having symptoms.     Dr. CHAMPION

## 2022-05-16 NOTE — TELEPHONE ENCOUNTER
----- Message from Elvia Chacko MD sent at 5/16/2022  5:54 AM CDT -----  I sent pt a my chart message -  I reviewed your Urine culture which was consistent with a Klebsiella Urinary tract infection which is sensitive to the Bactrim you are taking.  Let me know if you are still having symptoms.     Dr. CHAMPION

## 2022-06-30 NOTE — PROGRESS NOTES
"[facility] Note    Chief Complaint      Chief Complaint   Patient presents with    Follow-up       History of Present Illness      Padmini Pollock is a 57 y.o. female   with Heptic cyst, Simple Renal cyst, GERD, HLD, Postmenopausal on HRT, HTN, Colon Polyps presents to fu  well visit. Referred by Mom, Christianne . Last visit - 5/11/22    Fatty Liver - Abd U/S -5/13/22 - Numerous simple and mildly complex hepatic cysts. +Fatty liver. Rec limit tylenol and alcohol.  Rec diet and exercise for wt loss.  As discussed at visit there is a potential for cirrhosis.     Urinary Symptoms - She was having an irritation that felt like spasms which stopped when she stopped taking electrolyte packets. She reports one episode of brown discharge/spotting. She is s/p partial Hys. She is on HRT. Rec she fu with OB. Pt had Urinary Sx's > 5 times since Jan. Prev h/o Recurrent UTI       COVID + 12/27/21 -  Has not needed Albuterol prn.      Hepatic cyst - Referred to hepatology since a 2.1 complex rather than simple liver cyst. +renal cysts - lgst of which is 4.3 cm. She plans to sched appt.  She deferred due to Mom having recurrence of CA and is planning to sched.     Simple Renal cyst - Abd U/S - 5/13/22 -  Multiple cysts.  Lgst at the lower pole  a 4.3 cm cyst.     GERD - Rec reflux prec. Rec pepcid OTC prn.      HLD - Pt on Pravastatin 20 mg QHS. She prev had  labs with Cards, Dr. Dobson.       Postmenopausal on HRT - Pt on estrace per OB. Plans to fu due to spotting.      HTN - Pt on Atenolol  25 mg QHS and Losartan 25 mg 1/2 tab po QAm.  She could not tolerate 25 mg of Losartan prev.  Wt loss - down 12 lb since June with diet.     Colon Polyps - due for repeat C-scope with Dr. Mayorga. Pt sched for a few wks from now for GI appt.     Cough variant asthma - saw Dr. Kovacs in past. She ihas not needed inhaler.   She reports PFT's were "borderline".      HCM - Flu - 9/2020 - due;  Tdap -1/26/19;  PCV 13 - none;  PVX 23 - " none- pt defers;   Shingrix - none;  COVID - 19 Vaccine (Pfizer)  #1 21; #2 3/17/21; and # 3 - due; MGM -  3/8/22 - at [facility]- repeat 1 yr;  DEXA - none;  PAP - 3/22/22- neg; Hep C Screen - 11/3/21 - neg.;  HIV Screen - 11/3/21 - neg.; C-scope - 16 - ulceration - repeat 5 yrs - due;    Prev PCP - none;  Ob/GYN - Dr. Ndiaye; Cards - Dr. Dobson; GI - Dr. Mayorga; Pulm - Dr. Kovacs; Well visit - 22      Patient Care Team:  Elvia Chacko MD as PCP - General (Internal Medicine)  Kenyatta Ramirez MA as Care Coordinator  Jose Roberto Mayorga MD as Consulting Physician (Gastroenterology)     Health Maintenance:  Immunization History   Administered Date(s) Administered    COVID-19, MRNA, LN-S, PF (Pfizer) (Purple Cap) 2021, 2021    Influenza - Quadrivalent - MDCK - PF 10/06/2018, 2019    Influenza - Quadrivalent - PF *Preferred* (6 months and older) 2020    Influenza - Trivalent (ADULT) 2015    Tdap 2019      Health Maintenance   Topic Date Due    Mammogram  2023    Lipid Panel  2026    TETANUS VACCINE  2029    Hepatitis C Screening  Completed        Past Medical History:  Past Medical History:   Diagnosis Date    Depression     Endometriosis     Family history of breast cancer in mother     Hx of migraine headaches     Hyperlipidemia     Hyperlipidemia 2021    Hypertension 2021    Menopause     Prolapsing mitral leaflet syndrome        Past Surgical History:   has a past surgical history that includes  section; Hysterectomy (2011); and Colonoscopy ().    Family History:  family history includes Breast cancer in her mother; Heart attack in her father; Heart disease (age of onset: 65) in her father; Hypertension in her father and mother; No Known Problems in her sister.     Social History:  Social History     Tobacco Use    Smoking status: Never Smoker    Smokeless tobacco: Never Used   Substance Use Topics     "Alcohol use: Yes     Comment: rare - once every 6 mos    Drug use: Never       Review of Systems   Constitutional: Negative for chills, diaphoresis and fever.   HENT: Negative for nasal congestion and sore throat.    Eyes: Negative for visual disturbance.   Respiratory: Negative for cough, chest tightness and shortness of breath.    Cardiovascular: Negative for chest pain.   Gastrointestinal: Negative for abdominal pain, constipation, diarrhea, nausea and vomiting.   Endocrine: Negative for polydipsia.   Genitourinary: Negative for bladder incontinence, dysuria and frequency.   Musculoskeletal: Negative for arthralgias and myalgias.   Neurological: Negative for dizziness and headaches.   Psychiatric/Behavioral: Negative for dysphoric mood. The patient is not nervous/anxious.         Medications:    Current Outpatient Medications:     atenoloL (TENORMIN) 25 MG tablet, TAKE 1 TABLET BY MOUTH EVERY DAY, Disp: 90 tablet, Rfl: 3    estradioL (ESTRACE) 1 MG tablet, Take 1 tablet (1 mg total) by mouth once daily., Disp: 90 tablet, Rfl: 3    losartan (COZAAR) 25 MG tablet, Take 1 tablet (25 mg total) by mouth once daily., Disp: 90 tablet, Rfl: 1    pravastatin (PRAVACHOL) 20 MG tablet, Take 1 tablet (20 mg total) by mouth once daily., Disp: 90 tablet, Rfl: 1     Allergies:  Review of patient's allergies indicates:  No Known Allergies    Physical Exam      Vital Signs  Temp: 97.8 °F (36.6 °C)  Pulse: 69  Resp: 18  SpO2: 98 %  BP: 132/82  BP Location: Left arm  Patient Position: Sitting  Pain Score: 0-No pain  Height and Weight  Height: 5' 2" (157.5 cm)  Weight: 63.7 kg (140 lb 6.9 oz)  BSA (Calculated - sq m): 1.67 sq meters  BMI (Calculated): 25.7  Weight in (lb) to have BMI = 25: 136.4      Patient Position: Sitting      Physical Exam  Vitals reviewed.   Constitutional:       General: She is not in acute distress.     Appearance: Normal appearance. She is not ill-appearing, toxic-appearing or diaphoretic.   HENT:      " Head: Normocephalic.      Right Ear: Tympanic membrane normal.      Left Ear: Tympanic membrane normal.   Eyes:      Extraocular Movements: Extraocular movements intact.      Conjunctiva/sclera: Conjunctivae normal.      Pupils: Pupils are equal, round, and reactive to light.   Neck:      Vascular: No carotid bruit.   Cardiovascular:      Rate and Rhythm: Normal rate and regular rhythm.      Pulses: Normal pulses.      Heart sounds: Normal heart sounds. No murmur heard.  Pulmonary:      Effort: No respiratory distress.   Abdominal:      General: Bowel sounds are normal. There is no distension.      Palpations: Abdomen is soft.      Tenderness: There is no abdominal tenderness. There is no guarding or rebound.   Musculoskeletal:         General: Normal range of motion.   Skin:     General: Skin is warm and dry.   Neurological:      General: No focal deficit present.      Mental Status: She is alert and oriented to person, place, and time.   Psychiatric:         Mood and Affect: Mood normal.         Behavior: Behavior normal.          Laboratory:  CBC:  Recent Labs   Lab 11/03/21  0000   WBC 7.1   RBC 4.27   Hemoglobin 13.8   Hematocrit 40.6   Platelets 337   MCV 95.1   MCH 32.2   MCHC 33.9       CMP:  Recent Labs   Lab 11/03/21  0000 07/01/22  0820   Glucose 92  --    Calcium 9.4  --    Albumin  --  3.7   ALBUMIN 4.0  --    Total Protein 7.0 7.2   Sodium 142  --    Potassium 4.3  --    CO2 26  --    Chloride 103  --    BUN 10  --    Creatinine 0.8  --    Alkaline Phosphatase 121 91   ALT 58 H 18   AST 56 H 19   Total Bilirubin 0.3 0.4           URINALYSIS:  Recent Labs   Lab 05/12/22  1548   Color, UA Straw   Specific Gravity, UA 1.000 A   pH, UA 7.0   Protein, UA Negative   Bacteria Rare   Nitrite, UA Negative   Leukocytes, UA 3+ A        LIPIDS:  Recent Labs   Lab 11/03/21  0000   TSH 1.90   HDL 50   Cholesterol 203 H   Triglycerides 142   LDL Calculated 130 H   Non-HDL Cholesterol 153 H       TSH:  Recent Labs    Lab 11/03/21  0000   TSH 1.90         Recent Labs   Lab 11/03/21  0000   Hepatitis C Ab NON-REACTIVE       Assessment/Plan     Padmini Pollock is a 57 y.o.female with:    Normal physical exam, routine  -  Performed today.       Hypertension, unspecified type  -     losartan (COZAAR) 25 MG tablet; Take 1 tablet (25 mg total) by mouth once daily.  Dispense: 90 tablet; Refill: 1  - Controlled.  Cont current.     Hyperlipidemia, unspecified hyperlipidemia type  -     pravastatin (PRAVACHOL) 20 MG tablet; Take 1 tablet (20 mg total) by mouth once daily.  Dispense: 90 tablet; Refill: 1  - Controlled.  Cont current.     Fatty liver  - Limit tylenol and alcohol.  Rec diet and exercise for wt loss.     Transaminitis  -     Hepatic Function Panel; Future; Expected date: 07/01/2022  - Cont to monitor. Suspect due to fatty liver.     Liver cyst  - Pt planning to fu with hepatology.     Gastroesophageal reflux disease, unspecified whether esophagitis present  - Stable.    Polyp of colon, unspecified part of colon, unspecified type  - Due for repeat C-scope with Dr. Mayorga. Pt sched for a few wks from now for GI appt.  Chronic conditions status updated as per HPI.  Other than changes above, cont current medications and maintain follow up with specialists.  Follow up in about 6 months (around 1/1/2023) for fu chronic issues or sooner if needed.      Elvia Chacko MD  [facility]

## 2022-07-01 ENCOUNTER — OFFICE VISIT (OUTPATIENT)
Dept: PRIMARY CARE CLINIC | Facility: CLINIC | Age: 57
End: 2022-07-01
Payer: COMMERCIAL

## 2022-07-01 ENCOUNTER — HOSPITAL ENCOUNTER (OUTPATIENT)
Dept: RADIOLOGY | Facility: CLINIC | Age: 57
Discharge: HOME | End: 2022-07-01
Attending: OBSTETRICS & GYNECOLOGY
Payer: COMMERCIAL

## 2022-07-01 VITALS
RESPIRATION RATE: 18 BRPM | HEART RATE: 69 BPM | HEIGHT: 62 IN | DIASTOLIC BLOOD PRESSURE: 82 MMHG | BODY MASS INDEX: 25.84 KG/M2 | SYSTOLIC BLOOD PRESSURE: 132 MMHG | TEMPERATURE: 98 F | OXYGEN SATURATION: 98 % | WEIGHT: 140.44 LBS

## 2022-07-01 DIAGNOSIS — Z00.00 NORMAL PHYSICAL EXAM, ROUTINE: Primary | ICD-10-CM

## 2022-07-01 DIAGNOSIS — K76.0 FATTY LIVER: ICD-10-CM

## 2022-07-01 DIAGNOSIS — K63.5 POLYP OF COLON, UNSPECIFIED PART OF COLON, UNSPECIFIED TYPE: ICD-10-CM

## 2022-07-01 DIAGNOSIS — I10 HYPERTENSION, UNSPECIFIED TYPE: ICD-10-CM

## 2022-07-01 DIAGNOSIS — K76.89 LIVER CYST: ICD-10-CM

## 2022-07-01 DIAGNOSIS — R74.01 TRANSAMINITIS: ICD-10-CM

## 2022-07-01 DIAGNOSIS — E78.5 HYPERLIPIDEMIA, UNSPECIFIED HYPERLIPIDEMIA TYPE: ICD-10-CM

## 2022-07-01 DIAGNOSIS — K21.9 GASTROESOPHAGEAL REFLUX DISEASE, UNSPECIFIED WHETHER ESOPHAGITIS PRESENT: ICD-10-CM

## 2022-07-01 PROCEDURE — 1160F RVW MEDS BY RX/DR IN RCRD: CPT | Mod: CPTII,S$GLB,, | Performed by: INTERNAL MEDICINE

## 2022-07-01 PROCEDURE — 3075F SYST BP GE 130 - 139MM HG: CPT | Mod: CPTII,S$GLB,, | Performed by: INTERNAL MEDICINE

## 2022-07-01 PROCEDURE — 77080 DEXA BONE DENSITY SPINE HIP: ICD-10-PCS | Mod: 26,,, | Performed by: INTERNAL MEDICINE

## 2022-07-01 PROCEDURE — 3008F BODY MASS INDEX DOCD: CPT | Mod: CPTII,S$GLB,, | Performed by: INTERNAL MEDICINE

## 2022-07-01 PROCEDURE — 3079F PR MOST RECENT DIASTOLIC BLOOD PRESSURE 80-89 MM HG: ICD-10-PCS | Mod: CPTII,S$GLB,, | Performed by: INTERNAL MEDICINE

## 2022-07-01 PROCEDURE — 3075F PR MOST RECENT SYSTOLIC BLOOD PRESS GE 130-139MM HG: ICD-10-PCS | Mod: CPTII,S$GLB,, | Performed by: INTERNAL MEDICINE

## 2022-07-01 PROCEDURE — 1159F MED LIST DOCD IN RCRD: CPT | Mod: CPTII,S$GLB,, | Performed by: INTERNAL MEDICINE

## 2022-07-01 PROCEDURE — 99396 PREV VISIT EST AGE 40-64: CPT | Mod: S$GLB,,, | Performed by: INTERNAL MEDICINE

## 2022-07-01 PROCEDURE — 3079F DIAST BP 80-89 MM HG: CPT | Mod: CPTII,S$GLB,, | Performed by: INTERNAL MEDICINE

## 2022-07-01 PROCEDURE — 3008F PR BODY MASS INDEX (BMI) DOCUMENTED: ICD-10-PCS | Mod: CPTII,S$GLB,, | Performed by: INTERNAL MEDICINE

## 2022-07-01 PROCEDURE — 77080 DXA BONE DENSITY AXIAL: CPT | Mod: TC

## 2022-07-01 PROCEDURE — 1160F PR REVIEW ALL MEDS BY PRESCRIBER/CLIN PHARMACIST DOCUMENTED: ICD-10-PCS | Mod: CPTII,S$GLB,, | Performed by: INTERNAL MEDICINE

## 2022-07-01 PROCEDURE — 99999 PR PBB SHADOW E&M-EST. PATIENT-LVL IV: CPT | Mod: PBBFAC,,, | Performed by: INTERNAL MEDICINE

## 2022-07-01 PROCEDURE — 1159F PR MEDICATION LIST DOCUMENTED IN MEDICAL RECORD: ICD-10-PCS | Mod: CPTII,S$GLB,, | Performed by: INTERNAL MEDICINE

## 2022-07-01 PROCEDURE — 4010F ACE/ARB THERAPY RXD/TAKEN: CPT | Mod: CPTII,S$GLB,, | Performed by: INTERNAL MEDICINE

## 2022-07-01 PROCEDURE — 99999 PR PBB SHADOW E&M-EST. PATIENT-LVL IV: ICD-10-PCS | Mod: PBBFAC,,, | Performed by: INTERNAL MEDICINE

## 2022-07-01 PROCEDURE — 77080 DXA BONE DENSITY AXIAL: CPT | Mod: 26,,, | Performed by: INTERNAL MEDICINE

## 2022-07-01 PROCEDURE — 99396 PR PREVENTIVE VISIT,EST,40-64: ICD-10-PCS | Mod: S$GLB,,, | Performed by: INTERNAL MEDICINE

## 2022-07-01 PROCEDURE — 4010F PR ACE/ARB THEARPY RXD/TAKEN: ICD-10-PCS | Mod: CPTII,S$GLB,, | Performed by: INTERNAL MEDICINE

## 2022-07-01 RX ORDER — PRAVASTATIN SODIUM 20 MG/1
20 TABLET ORAL DAILY
Qty: 90 TABLET | Refills: 1 | Status: SHIPPED | OUTPATIENT
Start: 2022-07-01 | End: 2023-01-13 | Stop reason: SDUPTHER

## 2022-07-01 RX ORDER — LOSARTAN POTASSIUM 25 MG/1
25 TABLET ORAL DAILY
Qty: 90 TABLET | Refills: 1 | Status: SHIPPED | OUTPATIENT
Start: 2022-07-01 | End: 2023-04-20

## 2022-08-02 ENCOUNTER — LAB VISIT (OUTPATIENT)
Dept: LAB | Facility: HOSPITAL | Age: 57
End: 2022-08-02
Attending: INTERNAL MEDICINE
Payer: COMMERCIAL

## 2022-08-02 ENCOUNTER — PATIENT MESSAGE (OUTPATIENT)
Dept: PRIMARY CARE CLINIC | Facility: CLINIC | Age: 57
End: 2022-08-02
Payer: COMMERCIAL

## 2022-08-02 DIAGNOSIS — R35.0 URINE FREQUENCY: Primary | ICD-10-CM

## 2022-08-02 DIAGNOSIS — R82.90 ABNORMAL URINE ODOR: ICD-10-CM

## 2022-08-02 DIAGNOSIS — R35.0 URINE FREQUENCY: ICD-10-CM

## 2022-08-02 LAB
BACTERIA #/AREA URNS AUTO: ABNORMAL /HPF
BILIRUB UR QL STRIP: NEGATIVE
CLARITY UR REFRACT.AUTO: ABNORMAL
COLOR UR AUTO: YELLOW
GLUCOSE UR QL STRIP: NEGATIVE
HGB UR QL STRIP: ABNORMAL
HYALINE CASTS UR QL AUTO: 0 /LPF
KETONES UR QL STRIP: NEGATIVE
LEUKOCYTE ESTERASE UR QL STRIP: ABNORMAL
MICROSCOPIC COMMENT: ABNORMAL
NITRITE UR QL STRIP: NEGATIVE
PH UR STRIP: 6 [PH] (ref 5–8)
PROT UR QL STRIP: ABNORMAL
RBC #/AREA URNS AUTO: 2 /HPF (ref 0–4)
SP GR UR STRIP: 1 (ref 1–1.03)
SQUAMOUS #/AREA URNS AUTO: 2 /HPF
URN SPEC COLLECT METH UR: ABNORMAL
WBC #/AREA URNS AUTO: >100 /HPF (ref 0–5)
WBC CLUMPS UR QL AUTO: ABNORMAL

## 2022-08-02 PROCEDURE — 81001 URINALYSIS AUTO W/SCOPE: CPT | Performed by: INTERNAL MEDICINE

## 2022-08-02 PROCEDURE — 87086 URINE CULTURE/COLONY COUNT: CPT | Performed by: INTERNAL MEDICINE

## 2022-08-02 NOTE — TELEPHONE ENCOUNTER
I put in order for UA. Let's see what the results show but she has been calling often with urinary symptoms We may need to refer to urology in near future. I await the results.

## 2022-08-03 ENCOUNTER — TELEPHONE (OUTPATIENT)
Dept: PRIMARY CARE CLINIC | Facility: CLINIC | Age: 57
End: 2022-08-03
Payer: COMMERCIAL

## 2022-08-03 DIAGNOSIS — N39.0 RECURRENT UTI: ICD-10-CM

## 2022-08-03 DIAGNOSIS — N30.00 ACUTE CYSTITIS WITHOUT HEMATURIA: Primary | ICD-10-CM

## 2022-08-03 RX ORDER — SULFAMETHOXAZOLE AND TRIMETHOPRIM 800; 160 MG/1; MG/1
1 TABLET ORAL 2 TIMES DAILY
Qty: 6 TABLET | Refills: 0 | Status: SHIPPED | OUTPATIENT
Start: 2022-08-03 | End: 2022-08-06

## 2022-08-03 NOTE — TELEPHONE ENCOUNTER
----- Message from Kemi Bradford sent at 8/3/2022  9:53 AM CDT -----  Contact: [phone] Patient  Pt is requesting a call back regarding the urine test done yesterday. Pt is wondering if the Dr is calling in something for her. Please call and advise. Thank you    [address], [place] - [address]  [address] [place] [address]  Phone: [phone] Fax: [phone]

## 2022-08-03 NOTE — TELEPHONE ENCOUNTER
Pt is requesting u/a results. Culture is pending. Pt is wondering if you will send abx in for her in the meantime

## 2022-08-03 NOTE — PROGRESS NOTES
I sent pt a my chart message -  I reviewed your  Urinalysis. It appears likely consistent with a UTI. This will be your 3rd one this year.  Will refer to Urology for further evaluation  Will Tx empirically with Bactrim while we await your Urine culture results. Please make sure that you are urinating after intercourse as sometimes this can help decrease the likelihood of a UTI    Dr. CHAMPION

## 2022-08-04 LAB — BACTERIA UR CULT: NORMAL

## 2022-08-04 NOTE — PROGRESS NOTES
I sent pt a my chart message -  Your urine culture showed no growth. How are you feeling since starting the bactrim?  I still want you to see Urology.  Could be this is not a urinary tract infection but instead atrophic vaginitis (low estrogencausing vagina dryness ) which can mimic UTI symptoms.     Dr. CHAMPION

## 2022-08-16 LAB — CRC RECOMMENDATION EXT: NORMAL

## 2022-09-06 ENCOUNTER — TELEPHONE (OUTPATIENT)
Dept: OBSTETRICS AND GYNECOLOGY | Facility: CLINIC | Age: 57
End: 2022-09-06
Payer: COMMERCIAL

## 2022-09-06 ENCOUNTER — LAB VISIT (OUTPATIENT)
Dept: LAB | Facility: HOSPITAL | Age: 57
End: 2022-09-06
Attending: OBSTETRICS & GYNECOLOGY
Payer: COMMERCIAL

## 2022-09-06 ENCOUNTER — PATIENT OUTREACH (OUTPATIENT)
Dept: ADMINISTRATIVE | Facility: HOSPITAL | Age: 57
End: 2022-09-06
Payer: COMMERCIAL

## 2022-09-06 DIAGNOSIS — R82.90 URINE ABNORMALITY: Primary | ICD-10-CM

## 2022-09-06 DIAGNOSIS — R82.90 URINE ABNORMALITY: ICD-10-CM

## 2022-09-06 PROCEDURE — 87186 SC STD MICRODIL/AGAR DIL: CPT | Performed by: OBSTETRICS & GYNECOLOGY

## 2022-09-06 PROCEDURE — 87088 URINE BACTERIA CULTURE: CPT | Performed by: OBSTETRICS & GYNECOLOGY

## 2022-09-06 PROCEDURE — 87077 CULTURE AEROBIC IDENTIFY: CPT | Performed by: OBSTETRICS & GYNECOLOGY

## 2022-09-06 PROCEDURE — 87086 URINE CULTURE/COLONY COUNT: CPT | Performed by: OBSTETRICS & GYNECOLOGY

## 2022-09-06 NOTE — TELEPHONE ENCOUNTER
Patient c/o UTI. She has pressure and frequency.She is now in a relationship and is having them more frequently. She talked to her PCP who recommends her to see a urologist, which the patient is not opposed to, but would like RX for treatment now. Macrobid pended. NKDA. Patient scheduled to drop off UA and culture.

## 2022-09-07 RX ORDER — NITROFURANTOIN 25; 75 MG/1; MG/1
100 CAPSULE ORAL 2 TIMES DAILY
Qty: 14 CAPSULE | Refills: 0 | Status: SHIPPED | OUTPATIENT
Start: 2022-09-07 | End: 2022-09-14

## 2022-09-09 LAB — BACTERIA UR CULT: ABNORMAL

## 2022-10-13 ENCOUNTER — OFFICE VISIT (OUTPATIENT)
Dept: HEPATOLOGY | Facility: CLINIC | Age: 57
End: 2022-10-13
Payer: COMMERCIAL

## 2022-10-13 ENCOUNTER — PROCEDURE VISIT (OUTPATIENT)
Dept: HEPATOLOGY | Facility: CLINIC | Age: 57
End: 2022-10-13
Payer: COMMERCIAL

## 2022-10-13 VITALS
WEIGHT: 141.13 LBS | TEMPERATURE: 97 F | DIASTOLIC BLOOD PRESSURE: 69 MMHG | OXYGEN SATURATION: 98 % | SYSTOLIC BLOOD PRESSURE: 133 MMHG | HEART RATE: 71 BPM | RESPIRATION RATE: 18 BRPM | BODY MASS INDEX: 25.97 KG/M2 | HEIGHT: 62 IN

## 2022-10-13 DIAGNOSIS — K76.9 LIVER DISEASE, UNSPECIFIED: ICD-10-CM

## 2022-10-13 DIAGNOSIS — K76.0 FATTY LIVER: ICD-10-CM

## 2022-10-13 DIAGNOSIS — K76.89 LIVER CYST: ICD-10-CM

## 2022-10-13 DIAGNOSIS — K76.0 NAFLD (NONALCOHOLIC FATTY LIVER DISEASE): Primary | ICD-10-CM

## 2022-10-13 PROCEDURE — 1160F RVW MEDS BY RX/DR IN RCRD: CPT | Mod: CPTII,S$GLB,, | Performed by: STUDENT IN AN ORGANIZED HEALTH CARE EDUCATION/TRAINING PROGRAM

## 2022-10-13 PROCEDURE — 3075F PR MOST RECENT SYSTOLIC BLOOD PRESS GE 130-139MM HG: ICD-10-PCS | Mod: CPTII,S$GLB,, | Performed by: STUDENT IN AN ORGANIZED HEALTH CARE EDUCATION/TRAINING PROGRAM

## 2022-10-13 PROCEDURE — 3078F PR MOST RECENT DIASTOLIC BLOOD PRESSURE < 80 MM HG: ICD-10-PCS | Mod: CPTII,S$GLB,, | Performed by: STUDENT IN AN ORGANIZED HEALTH CARE EDUCATION/TRAINING PROGRAM

## 2022-10-13 PROCEDURE — 1159F PR MEDICATION LIST DOCUMENTED IN MEDICAL RECORD: ICD-10-PCS | Mod: CPTII,S$GLB,, | Performed by: STUDENT IN AN ORGANIZED HEALTH CARE EDUCATION/TRAINING PROGRAM

## 2022-10-13 PROCEDURE — 3075F SYST BP GE 130 - 139MM HG: CPT | Mod: CPTII,S$GLB,, | Performed by: STUDENT IN AN ORGANIZED HEALTH CARE EDUCATION/TRAINING PROGRAM

## 2022-10-13 PROCEDURE — 4010F ACE/ARB THERAPY RXD/TAKEN: CPT | Mod: CPTII,S$GLB,, | Performed by: STUDENT IN AN ORGANIZED HEALTH CARE EDUCATION/TRAINING PROGRAM

## 2022-10-13 PROCEDURE — 3078F DIAST BP <80 MM HG: CPT | Mod: CPTII,S$GLB,, | Performed by: STUDENT IN AN ORGANIZED HEALTH CARE EDUCATION/TRAINING PROGRAM

## 2022-10-13 PROCEDURE — 99999 PR PBB SHADOW E&M-EST. PATIENT-LVL V: CPT | Mod: PBBFAC,,, | Performed by: STUDENT IN AN ORGANIZED HEALTH CARE EDUCATION/TRAINING PROGRAM

## 2022-10-13 PROCEDURE — 99204 PR OFFICE/OUTPT VISIT, NEW, LEVL IV, 45-59 MIN: ICD-10-PCS | Mod: S$GLB,,, | Performed by: STUDENT IN AN ORGANIZED HEALTH CARE EDUCATION/TRAINING PROGRAM

## 2022-10-13 PROCEDURE — 1159F MED LIST DOCD IN RCRD: CPT | Mod: CPTII,S$GLB,, | Performed by: STUDENT IN AN ORGANIZED HEALTH CARE EDUCATION/TRAINING PROGRAM

## 2022-10-13 PROCEDURE — 99204 OFFICE O/P NEW MOD 45 MIN: CPT | Mod: S$GLB,,, | Performed by: STUDENT IN AN ORGANIZED HEALTH CARE EDUCATION/TRAINING PROGRAM

## 2022-10-13 PROCEDURE — 4010F PR ACE/ARB THEARPY RXD/TAKEN: ICD-10-PCS | Mod: CPTII,S$GLB,, | Performed by: STUDENT IN AN ORGANIZED HEALTH CARE EDUCATION/TRAINING PROGRAM

## 2022-10-13 PROCEDURE — 1160F PR REVIEW ALL MEDS BY PRESCRIBER/CLIN PHARMACIST DOCUMENTED: ICD-10-PCS | Mod: CPTII,S$GLB,, | Performed by: STUDENT IN AN ORGANIZED HEALTH CARE EDUCATION/TRAINING PROGRAM

## 2022-10-13 PROCEDURE — 99999 PR PBB SHADOW E&M-EST. PATIENT-LVL V: ICD-10-PCS | Mod: PBBFAC,,, | Performed by: STUDENT IN AN ORGANIZED HEALTH CARE EDUCATION/TRAINING PROGRAM

## 2022-10-13 PROCEDURE — 91200 FIBROSCAN NEW ORLEANS: ICD-10-PCS | Mod: S$GLB,,, | Performed by: STUDENT IN AN ORGANIZED HEALTH CARE EDUCATION/TRAINING PROGRAM

## 2022-10-13 PROCEDURE — 91200 LIVER ELASTOGRAPHY: CPT | Mod: S$GLB,,, | Performed by: STUDENT IN AN ORGANIZED HEALTH CARE EDUCATION/TRAINING PROGRAM

## 2022-10-13 NOTE — PROGRESS NOTES
Hepatology Consult Note    Referring provider: Dr. Elvia Chacko  PCP: Elvia Chacko MD    Chief complaint:  Cysts    HPI:  Padmini Pollock is a 57 y.o. female who was referred to Hepatology Clinic for hepatic cysts.    She underwent ultrasound in 2022 for urinary symptoms that incidentally showed numerous simple and mildly complex hepatic cysts measuring up to 6.9 cm as well as hepatic steatosis.  Prior to this she had never been told that she had any liver issues.  She rarely drinks alcohol and has never been a heavy drinker. She has never received a blood transfusion and has no tattoos. She denies history of IV drug use.  Mother with fatty liver.  No other known family history of liver disease. She denies signs of decompensated liver disease including no recent abdominal distension, encephalopathy, jaundice, GI bleeding.    Past Medical History:   Diagnosis Date    Depression     Endometriosis     Family history of breast cancer in mother     Hx of migraine headaches     Hyperlipidemia     Hyperlipidemia 2021    Hypertension 2021    Menopause     Prolapsing mitral leaflet syndrome        Past Surgical History:   Procedure Laterality Date     SECTION      x 2    COLONOSCOPY  2016    Normal     HYSTERECTOMY  2011    Supra Cervical w/ Dr Jamie Mayorga (Outpt. facility [facility])       Family History   Problem Relation Age of Onset    Heart attack Father     Heart disease Father 65    Hypertension Father     Breast cancer Mother     Hypertension Mother     No Known Problems Sister     Colon cancer Neg Hx     Ovarian cancer Neg Hx     Diabetes Neg Hx        Social History     Tobacco Use    Smoking status: Never    Smokeless tobacco: Never   Substance Use Topics    Alcohol use: Yes     Comment: rare - once every 6 mos    Drug use: Never       Current Outpatient Medications   Medication Sig Dispense Refill    atenoloL (TENORMIN) 25 MG tablet TAKE 1 TABLET BY MOUTH EVERY DAY 90 tablet  "3    estradioL (ESTRACE) 1 MG tablet Take 1 tablet (1 mg total) by mouth once daily. 90 tablet 3    losartan (COZAAR) 25 MG tablet Take 1 tablet (25 mg total) by mouth once daily. 90 tablet 1    pravastatin (PRAVACHOL) 20 MG tablet Take 1 tablet (20 mg total) by mouth once daily. 90 tablet 1     No current facility-administered medications for this visit.       Review of patient's allergies indicates:  No Known Allergies    Review of Systems   Constitutional:  Negative for fever and weight loss.   Cardiovascular:  Negative for leg swelling.   Gastrointestinal:  Negative for abdominal pain, blood in stool, constipation, diarrhea, heartburn, melena, nausea and vomiting.     Vitals:    10/13/22 1434   BP: 133/69   Pulse: 71   Resp: 18   Temp: 97 °F (36.1 °C)   TempSrc: Oral   SpO2: 98%   Weight: 64 kg (141 lb 1.5 oz)   Height: 5' 2" (1.575 m)       Physical Exam  Vitals reviewed.   Constitutional:       General: She is not in acute distress.  Eyes:      General: No scleral icterus.  Cardiovascular:      Rate and Rhythm: Normal rate and regular rhythm.   Pulmonary:      Effort: Pulmonary effort is normal. No respiratory distress.   Abdominal:      General: Bowel sounds are normal. There is no distension.      Palpations: Abdomen is soft.      Tenderness: There is no abdominal tenderness. There is no guarding or rebound.   Musculoskeletal:      Right lower leg: No edema.      Left lower leg: No edema.   Skin:     Coloration: Skin is not jaundiced.       LABS: I personally reviewed pertinent laboratory findings.    Lab Results   Component Value Date    WBC 7.1 11/03/2021    HGB 13.8 11/03/2021    HCT 40.6 11/03/2021    MCV 95.1 11/03/2021     11/03/2021       Lab Results   Component Value Date     11/03/2021    K 4.3 11/03/2021     11/03/2021    CO2 26 11/03/2021    BUN 10 11/03/2021    CREATININE 0.8 11/03/2021    CALCIUM 9.4 11/03/2021    ANIONGAP 17 11/03/2021       Lab Results   Component Value Date "    ALT 18 07/01/2022    AST 19 07/01/2022    ALKPHOS 91 07/01/2022    BILITOT 0.4 07/01/2022       Lab Results   Component Value Date    HEPCAB NON-REACTIVE 11/03/2021       No results found for: MIGUEL, MITOAB, SMOOTHMUSCAB, IGG, CERULOPLSM       IMAGING: I personally reviewed imaging studies.      Assessment/Recommendations:  57 y.o. female who was referred to Hepatology Clinic for hepatic cysts.    She underwent ultrasound in 05/2022 for urinary symptoms that incidentally showed numerous simple and mildly complex hepatic cysts measuring up to 6.9 cm as well as hepatic steatosis.  LFTs within normal limits 07/2022.  FibroScan today shows S0 steatosis and F0 fibrosis.  Discussed the benign nature of hepatic cysts.  Will obtain MRI for further characterization.  If it confirms hepatic cysts without concerning features, no further evaluation or surveillance warranted.    I have sent communication to the referring physician and/or primary care provider.    I spent a total of 45 minutes on the day of the visit. This includes face to face time and non-face to face time preparing to see the patient (eg, review of tests), obtaining and/or reviewing separately obtained history, documenting clinical information in the electronic or other health record, independently interpreting results, and communicating results to the patient/family/caregiver, or care coordination.    Nick Avelar MD  Staff Physician  Hepatology and Liver Transplant  [facility]  [facility] [place]

## 2022-10-13 NOTE — PROCEDURES
FibroScan [place]    Date/Time: 10/13/2022 2:00 PM  Performed by: Nick Avelar MD  Authorized by: Nick Avelar MD     Diagnosis:  NAFLD    Probe:  M    Universal Protocol: Patient's identity, procedure and site were verified, confirmatory pause was performed.  Discussed procedure including risks and potential complications.  Questions answered.  Patient verbalizes understanding and wishes to proceed with VCTE.     Procedure: After providing explanations of the procedure, patient was placed in the supine position with right arm in maximum abduction to allow optimal exposure of right lateral abdomen.  Patient was briefly assessed, Testing was performed in the mid-axillary location, 50Hz Shear Wave pulses were applied and the resulting Shear Wave and Propagation Speed detected with a 3.5 MHz ultrasonic signal, using the FibroScan probe, Skin to liver capsule distance and liver parenchyma were accessed during the entire examination with the FibroScan probe, Patient was instructed to breathe normally and to abstain from sudden movements during the procedure, allowing for random measurements of liver stiffness. At least 10 Shear Waves were produced, Individual measurements of each Shear Wave were calculated.  Patient tolerated the procedure well with no complications.  Meets discharge criteria as was dismissed.  Rates pain 0 out of 10.  Patient will follow up with ordering provider to review results.    Findings  Median liver stiffness score:  4.7  CAP Reading: dB/m:  241    IQR/med %:  9  Interpretation  Fibrosis interpretation is based on medial liver stiffness - Kilopascal (kPa).    Fibrosis Stage:  F 0-1  Steatosis interpretation is based on controlled attenuation parameter - (dB/m).    Steatosis Grade:  <S1

## 2022-10-25 DIAGNOSIS — I10 HYPERTENSION, UNSPECIFIED TYPE: ICD-10-CM

## 2022-10-25 RX ORDER — ATENOLOL 25 MG/1
TABLET ORAL
Qty: 90 TABLET | Refills: 2 | Status: SHIPPED | OUTPATIENT
Start: 2022-10-25 | End: 2022-10-26

## 2022-10-25 NOTE — TELEPHONE ENCOUNTER
Refill Decision Note   Pamdini Pollock  is requesting a refill authorization.  Brief Assessment and Rationale for Refill:  Approve    -Medication-Related Problems Identified: Requires labs  Medication Therapy Plan:  Rx sent to home pharmacy.    Medication Reconciliation Completed: No   Comments:     Provider Staff:     Action is required for this patient.   Please see care gap opportunities below in Care Due Message.     Thanks!  [facility]     Appointments      Date Provider   Last Visit   7/1/2022 Elvia Chacko MD   Next Visit   Visit date not found Elvia Chacko MD     Note composed:5:56 PM 10/25/2022           Note composed:5:56 PM 10/25/2022

## 2022-10-25 NOTE — TELEPHONE ENCOUNTER
Care Due:                  Date            Visit Type   Department     Provider  --------------------------------------------------------------------------------                                EP -                              PRIMARY      [facility]  Last Visit: 07-      CARE ([facility])   CARE           Elvia Chacko  Next Visit: None Scheduled  None         None Found                                                            Last  Test          Frequency    Reason                     Performed    Due Date  --------------------------------------------------------------------------------    CMP.........  12 months..  losartan.................  11-   10-    Lipid Panel.  12 months..  pravastatin..............  11-   10-    [facility] Embedded Care Gaps. Reference number: [id number]. 10/25/2022   5:53:06 PM CDT

## 2022-10-26 ENCOUNTER — TELEPHONE (OUTPATIENT)
Dept: HEPATOLOGY | Facility: CLINIC | Age: 57
End: 2022-10-26
Payer: COMMERCIAL

## 2022-10-26 RX ORDER — ATENOLOL 25 MG/1
25 TABLET ORAL DAILY
Qty: 90 TABLET | Refills: 1 | Status: SHIPPED | OUTPATIENT
Start: 2022-10-26 | End: 2023-08-09 | Stop reason: SDUPTHER

## 2022-10-26 NOTE — TELEPHONE ENCOUNTER
MA contacted pt she needed a return to work letter from when she was here on 10/13 will mail to pt

## 2022-10-26 NOTE — TELEPHONE ENCOUNTER
----- Message from Wilber Magaña sent at 10/26/2022 12:49 PM CDT -----  Regarding: Speak to Staff  Patient called in requesting to speak with staff regarding her visit from 10/13. No other info provided Requesting a call back.        Contact: [phone]

## 2022-11-02 ENCOUNTER — HOSPITAL ENCOUNTER (OUTPATIENT)
Dept: RADIOLOGY | Facility: HOSPITAL | Age: 57
Discharge: HOME | End: 2022-11-02
Attending: STUDENT IN AN ORGANIZED HEALTH CARE EDUCATION/TRAINING PROGRAM
Payer: COMMERCIAL

## 2022-11-02 DIAGNOSIS — K76.89 LIVER CYST: ICD-10-CM

## 2022-11-02 DIAGNOSIS — K76.9 LIVER DISEASE, UNSPECIFIED: ICD-10-CM

## 2022-11-02 PROCEDURE — 74183 MRI ABDOMEN W WO CONTRAST: ICD-10-PCS | Mod: 26,,, | Performed by: RADIOLOGY

## 2022-11-02 PROCEDURE — 25500020 PHARM REV CODE 255: Performed by: STUDENT IN AN ORGANIZED HEALTH CARE EDUCATION/TRAINING PROGRAM

## 2022-11-02 PROCEDURE — 74183 MRI ABD W/O CNTR FLWD CNTR: CPT | Mod: 26,,, | Performed by: RADIOLOGY

## 2022-11-02 PROCEDURE — 74183 MRI ABD W/O CNTR FLWD CNTR: CPT | Mod: TC

## 2022-11-02 PROCEDURE — A9585 GADOBUTROL INJECTION: HCPCS | Performed by: STUDENT IN AN ORGANIZED HEALTH CARE EDUCATION/TRAINING PROGRAM

## 2022-11-02 RX ORDER — GADOBUTROL 604.72 MG/ML
10 INJECTION INTRAVENOUS
Status: COMPLETED | OUTPATIENT
Start: 2022-11-02 | End: 2022-11-02

## 2022-11-02 RX ADMIN — GADOBUTROL 10 ML: 604.72 INJECTION INTRAVENOUS at 06:11

## 2022-11-10 ENCOUNTER — TELEPHONE (OUTPATIENT)
Dept: HEPATOLOGY | Facility: CLINIC | Age: 57
End: 2022-11-10
Payer: COMMERCIAL

## 2022-11-10 NOTE — TELEPHONE ENCOUNTER
----- Message from Nick Avelar MD sent at 11/10/2022  3:36 PM CST -----  Return 1 year with ultrasound.

## 2022-11-23 ENCOUNTER — PATIENT MESSAGE (OUTPATIENT)
Dept: HEPATOLOGY | Facility: CLINIC | Age: 57
End: 2022-11-23
Payer: COMMERCIAL

## 2023-01-09 DIAGNOSIS — R39.89 SUSPECTED UTI: Primary | ICD-10-CM

## 2023-01-09 RX ORDER — NITROFURANTOIN 25; 75 MG/1; MG/1
100 CAPSULE ORAL 2 TIMES DAILY
Qty: 14 CAPSULE | Refills: 0 | Status: SHIPPED | OUTPATIENT
Start: 2023-01-09 | End: 2023-01-16

## 2023-01-09 NOTE — TELEPHONE ENCOUNTER
Pt reports dysuria, frequency, and urgency.  Denies fever or back pain at this time.  Has been taking AZOs.  Requesting Rx.     Recommended urine cx if no improvement in 3 days.    Macrobid pended   Methotrexate Counseling:  Patient counseled regarding adverse effects of methotrexate including but not limited to nausea, vomiting, abnormalities in liver function tests. Patients may develop mouth sores, rash, diarrhea, and abnormalities in blood counts. The patient understands that monitoring is required including LFT's and blood counts.  There is a rare possibility of scarring of the liver and lung problems that can occur when taking methotrexate. Persistent nausea, loss of appetite, pale stools, dark urine, cough, and shortness of breath should be reported immediately. Patient advised to discontinue methotrexate treatment at least three months before attempting to become pregnant.  I discussed the need for folate supplements while taking methotrexate.  These supplements can decrease side effects during methotrexate treatment. The patient verbalized understanding of the proper use and possible adverse effects of methotrexate.  All of the patient's questions and concerns were addressed.

## 2023-01-12 ENCOUNTER — TELEPHONE (OUTPATIENT)
Dept: PRIMARY CARE CLINIC | Facility: CLINIC | Age: 58
End: 2023-01-12
Payer: COMMERCIAL

## 2023-01-12 NOTE — TELEPHONE ENCOUNTER
Your next virtual opening is not until 2/14 and next in office is 3/21. Do you want me to do a nurse visit until we can get her back in to see you?

## 2023-01-12 NOTE — TELEPHONE ENCOUNTER
----- Message from Zack Underwood sent at 1/12/2023 10:11 AM CST -----  Contact: Pt [phone]  Consult    BP readings, she didn't have the exact readings with her/ it's between 140/80 and 160/80 this morning before taking meds and eating    Thank you

## 2023-01-12 NOTE — TELEPHONE ENCOUNTER
This is too high.  Needs an appt with me to adjust regimen. Can be virtual since she has a home cuff and  likely all I have available right now I virtual unless we have a cancellation. She was due to fr now anyway    Dr. CHAMPION

## 2023-01-12 NOTE — TELEPHONE ENCOUNTER
Yes and still schedule her for me and if we need to push it up we can figure it out depending on her nurse visit.     Dr. CHAMPION

## 2023-01-13 ENCOUNTER — OFFICE VISIT (OUTPATIENT)
Dept: PRIMARY CARE CLINIC | Facility: CLINIC | Age: 58
End: 2023-01-13
Payer: COMMERCIAL

## 2023-01-13 ENCOUNTER — CLINICAL SUPPORT (OUTPATIENT)
Dept: PRIMARY CARE CLINIC | Facility: CLINIC | Age: 58
End: 2023-01-13
Payer: COMMERCIAL

## 2023-01-13 VITALS — DIASTOLIC BLOOD PRESSURE: 94 MMHG | SYSTOLIC BLOOD PRESSURE: 152 MMHG

## 2023-01-13 DIAGNOSIS — K64.9 HEMORRHOIDS, UNSPECIFIED HEMORRHOID TYPE: ICD-10-CM

## 2023-01-13 DIAGNOSIS — R30.0 DYSURIA: ICD-10-CM

## 2023-01-13 DIAGNOSIS — I10 HYPERTENSION, UNSPECIFIED TYPE: Primary | ICD-10-CM

## 2023-01-13 DIAGNOSIS — Z01.30 BP CHECK: Primary | ICD-10-CM

## 2023-01-13 DIAGNOSIS — E78.5 HYPERLIPIDEMIA, UNSPECIFIED HYPERLIPIDEMIA TYPE: ICD-10-CM

## 2023-01-13 DIAGNOSIS — Z00.00 NORMAL PHYSICAL EXAM, ROUTINE: ICD-10-CM

## 2023-01-13 DIAGNOSIS — K76.89 LIVER CYST: ICD-10-CM

## 2023-01-13 DIAGNOSIS — Z13.220 SCREENING FOR LIPOID DISORDERS: ICD-10-CM

## 2023-01-13 PROCEDURE — 1160F PR REVIEW ALL MEDS BY PRESCRIBER/CLIN PHARMACIST DOCUMENTED: ICD-10-PCS | Mod: CPTII,95,, | Performed by: INTERNAL MEDICINE

## 2023-01-13 PROCEDURE — 1159F PR MEDICATION LIST DOCUMENTED IN MEDICAL RECORD: ICD-10-PCS | Mod: CPTII,95,, | Performed by: INTERNAL MEDICINE

## 2023-01-13 PROCEDURE — 3077F PR MOST RECENT SYSTOLIC BLOOD PRESSURE >= 140 MM HG: ICD-10-PCS | Mod: CPTII,95,, | Performed by: INTERNAL MEDICINE

## 2023-01-13 PROCEDURE — 99999 PR PBB SHADOW E&M-EST. PATIENT-LVL II: ICD-10-PCS | Mod: PBBFAC,,,

## 2023-01-13 PROCEDURE — 99214 PR OFFICE/OUTPT VISIT, EST, LEVL IV, 30-39 MIN: ICD-10-PCS | Mod: 95,,, | Performed by: INTERNAL MEDICINE

## 2023-01-13 PROCEDURE — 3077F SYST BP >= 140 MM HG: CPT | Mod: CPTII,95,, | Performed by: INTERNAL MEDICINE

## 2023-01-13 PROCEDURE — 99999 PR PBB SHADOW E&M-EST. PATIENT-LVL II: CPT | Mod: PBBFAC,,,

## 2023-01-13 PROCEDURE — 1159F MED LIST DOCD IN RCRD: CPT | Mod: CPTII,95,, | Performed by: INTERNAL MEDICINE

## 2023-01-13 PROCEDURE — 3080F DIAST BP >= 90 MM HG: CPT | Mod: CPTII,95,, | Performed by: INTERNAL MEDICINE

## 2023-01-13 PROCEDURE — 3080F PR MOST RECENT DIASTOLIC BLOOD PRESSURE >= 90 MM HG: ICD-10-PCS | Mod: CPTII,95,, | Performed by: INTERNAL MEDICINE

## 2023-01-13 PROCEDURE — 99214 OFFICE O/P EST MOD 30 MIN: CPT | Mod: 95,,, | Performed by: INTERNAL MEDICINE

## 2023-01-13 PROCEDURE — 1160F RVW MEDS BY RX/DR IN RCRD: CPT | Mod: CPTII,95,, | Performed by: INTERNAL MEDICINE

## 2023-01-13 RX ORDER — PRAVASTATIN SODIUM 20 MG/1
20 TABLET ORAL DAILY
Qty: 90 TABLET | Refills: 1 | Status: SHIPPED | OUTPATIENT
Start: 2023-01-13 | End: 2023-07-04

## 2023-01-13 NOTE — PROGRESS NOTES
Add  her virtually this am at end of clinic so I can adjust her meds for her uncontrolled BP    Dr. CHAMPION

## 2023-01-13 NOTE — TELEPHONE ENCOUNTER
Care Due:                  Date            Visit Type   Department     Provider  --------------------------------------------------------------------------------                                EP -                              PRIMARY      [facility]  Last Visit: 07-      CARE ([facility])   CARE           Elvia Chacko                              EP -                              PRIMARY      [facility]  Next Visit: 03-      CARE ([facility])   CARE           Elvia Chacko                                                            Last  Test          Frequency    Reason                     Performed    Due Date  --------------------------------------------------------------------------------    CMP.........  12 months..  losartan.................  11-   10-    Lipid Panel.  12 months..  pravastatin..............  11-   10-    [employer] Embedded Care Gaps. Reference number: [id number]. 1/13/2023   8:36:45 AM CST

## 2023-01-13 NOTE — PROGRESS NOTES
Patient present in clinic today for a Nurse BP Check. Reports headaches began more frequently when she started antibiotic. Pt advised that antibiotic can have side effect of headache.   Medications & Allergy List reviewed. Pt advised she is taking Losartan 25 mg 1/2 tablet, instead of whole tablet as listed on medication list.     BP Right Arm - 156/96  BP Left Arm - 154/94  Pulse - 78  SpO2 - 97 %    Five minute interval prior the second set of readings.     Repeat BP Right Arm - 152/94     Encounter being routed to Dr. Chacko for review.  Advised the patient that they will be contacted regarding any medication changes or if further appointments are required.

## 2023-01-13 NOTE — PROGRESS NOTES
"[facility] Note    Chief Complaint    No chief complaint on file.      History of Present Illness      Padmini Pollock is a 57 y.o. female   with Heptic cyst, Simple Renal cyst, GERD, HLD, Postmenopausal on HRT, HTN, Colon Polyps presents to   well visit. Referred by Mom Christianne . Last visit - 7/1/22    The patient location is: "work"  The chief complaint leading to consultation is: "fu HTN"    Visit type: audiovisual    Face to Face time with patient: 22 minutes  22 minutes of total time spent on the encounter, which includes face to face time and non-face to face time preparing to see the patient (eg, review of tests), Obtaining and/or reviewing separately obtained history, Documenting clinical information in the electronic or other health record, Independently interpreting results (not separately reported) and communicating results to the patient/family/caregiver, or Care coordination (not separately reported).         Each patient to whom he or she provides medical services by telemedicine is:  (1) informed of the relationship between the physician and patient and the respective role of any other health care provider with respect to management of the patient; and (2) notified that he or she may decline to receive medical services by telemedicine and may withdraw from such care at any time.    Notes:     HTN - Uncontrolled  on Atenolol  25 mg QHS and Losartan 25 mg 1/2 tab po QAm.  She could not tolerate 25 mg of Losartan prev.  She rarely takes 1 tab/d if her BP is high.  "It makes me tired".  She would like to try inc the Losartan to 25 mg/d.  Message us in 1-2 wks with BP log. Rec low sodium diet.     Recurrent Urinary Sx's -  She is s/p partial Hys. She is on HRT. Could be this is not a urinary tract infection but instead atrophic vaginitis (low estrogencausing vagina dryness) which can mimic UTI symptoms. She never fu with . She is on Macrobid per OB.  Doing better. No recent UA/Ucx. " "    Fatty Liver/Hepatic Cysts - Abd U/S -5/13/22 - Numerous simple and mildly complex hepatic cysts. +Fatty liver. Rec limit tylenol and alcohol.  Rec diet and exercise for wt loss.  As discussed at visit there is a potential for cirrhosis. Her Liver functions are back to normal. Fu by Dr. Avelar.  Elastography - 10/13/22 Fibrosis stage 0-1 Steatosis Grade < 1.      Hepatic cyst -  Fu by Hepatology, Dr. Avelar.  MRI abd - 11/2/22 - Multiple renal and hepatic cysts.   Will repeat U/s in 1 yr.      COVID + 12/2020 and 12/27/21  -  Has not needed Albuterol prn.      Simple Renal cyst - Abd U/S - 5/13/22 -  Multiple cysts.  Lgst at the lower pole  a 4.3 cm cyst.     GERD - Rec reflux prec. Rec pepcid OTC prn.      HLD - Pt on Pravastatin 20 mg QHS. She prev had  labs with Cards, Dr. Dobson.  Prefer not to go back to him.      Postmenopausal on HRT - Pt on estrace per OB.      Colon Polyps - Fu by  Dr. Mayorga.       Cough variant asthma - saw Dr. Kovacs in past. She has not needed inhaler.   She reports PFT's were "borderline".     Hemorrhoids - Rec adeq hydration.  Rec inc fiber in diet.  Rec stool softener Qday-BID and Miralax prn. conservative management options for symptoms related to hemorrhoids. Irritation or itching can be treated with Lidocaine cream/ointment, over-the-counter Hydrocortisone suppositories, warm baths or soaking.        HCM - Flu - 9/2020 - due;  Tdap -1/26/19;  PCV 13 - none;  PVX 23 - none- pt defers;   Shingrix - none;  COVID - 19 Vaccine (Pfizer)  #1 2/24/21; #2 3/17/21; and # 3 - due; MGM -  3/8/22 - at [facility]- repeat 1 yr;  DEXA - 7/1/22 - neg.;  PAP - 3/22/22- neg; Hep C Screen - 11/3/21 - neg.;  HIV Screen - 11/3/21 - neg.; C-scope - 8/16/22 - hemorrhoids, angioectasia, polyp- repeat 5 yrs;    Prev PCP - none;  Ob/GYN - Dr. Ndiaye; Prev Cards - Dr. Dobson; GI - Dr. Mayorga; Pulm - Dr. Kovacs; Well visit - 7/1/22    Patient Care Team:  Elvia Chacko MD as PCP - General (Internal " Medicine)  Kenyatta Ramirez MA as Care Coordinator  Jose Roberto Mayorga MD as Consulting Physician (Gastroenterology)     Health Maintenance:  Immunization History   Administered Date(s) Administered    COVID-19, MRNA, LN-S, PF (Pfizer) (Purple Cap) 2021, 2021    Influenza - Quadrivalent - MDCK - PF 10/06/2018, 2019    Influenza - Quadrivalent - PF *Preferred* (6 months and older) 2020    Influenza - Trivalent (ADULT) 2015    Tdap 2019      Health Maintenance   Topic Date Due    Mammogram  2023    DEXA Scan  2024    Lipid Panel  2026    TETANUS VACCINE  2029    Hepatitis C Screening  Completed        Past Medical History:  Past Medical History:   Diagnosis Date    Depression     Endometriosis     Family history of breast cancer in mother     Hx of migraine headaches     Hyperlipidemia     Hyperlipidemia 2021    Hypertension 2021    Menopause     Prolapsing mitral leaflet syndrome        Past Surgical History:   has a past surgical history that includes  section; Hysterectomy (); and Colonoscopy ().    Family History:  family history includes Breast cancer in her mother; Heart attack in her father; Heart disease (age of onset: 65) in her father; Hypertension in her father and mother; No Known Problems in her sister.     Social History:  Social History     Tobacco Use    Smoking status: Never    Smokeless tobacco: Never   Substance Use Topics    Alcohol use: Yes     Comment: rare - once every 6 mos    Drug use: Never       Review of Systems   Constitutional:  Positive for fatigue.   HENT:  Negative for nasal congestion and sore throat.    Respiratory:  Negative for cough and shortness of breath.         No snoring   Cardiovascular:  Negative for chest pain and palpitations.   Musculoskeletal:  Positive for neck pain.        Off and on. She turned her head quickly a couple wks ago and feels like she pinched something. She takes Tylenol  prn which helps. Rec heating pad x 20 min intervals.    Neurological:  Positive for headaches.      Medications:    Current Outpatient Medications:     atenoloL (TENORMIN) 25 MG tablet, Take 1 tablet (25 mg total) by mouth once daily., Disp: 90 tablet, Rfl: 1    estradioL (ESTRACE) 1 MG tablet, Take 1 tablet by mouth once daily, Disp: 90 tablet, Rfl: 1    losartan (COZAAR) 25 MG tablet, Take 1 tablet (25 mg total) by mouth once daily. (Patient taking differently: Take 12.5 mg by mouth once daily.), Disp: 90 tablet, Rfl: 1    nitrofurantoin, macrocrystal-monohydrate, (MACROBID) 100 MG capsule, Take 1 capsule (100 mg total) by mouth 2 (two) times daily. for 7 days, Disp: 14 capsule, Rfl: 0    pravastatin (PRAVACHOL) 20 MG tablet, Take 1 tablet (20 mg total) by mouth once daily., Disp: 90 tablet, Rfl: 1     Allergies:  Review of patient's allergies indicates:  No Known Allergies    Physical Exam                    Physical Exam  Constitutional:       General: She is not in acute distress.     Appearance: Normal appearance. She is not ill-appearing, toxic-appearing or diaphoretic.   HENT:      Head: Normocephalic and atraumatic.   Pulmonary:      Effort: No respiratory distress.   Neurological:      General: No focal deficit present.      Mental Status: She is alert and oriented to person, place, and time.   Psychiatric:         Mood and Affect: Mood normal.         Behavior: Behavior normal.        Laboratory:  CBC:  Recent Labs   Lab 11/03/21  0000   WBC 7.1   RBC 4.27   Hemoglobin 13.8   Hematocrit 40.6   Platelets 337   MCV 95.1   MCH 32.2   MCHC 33.9       CMP:  Recent Labs   Lab 11/03/21  0000 07/01/22  0820   Glucose 92  --    Calcium 9.4  --    Albumin  --  3.7   ALBUMIN 4.0  --    Total Protein 7.0 7.2   Sodium 142  --    Potassium 4.3  --    CO2 26  --    Chloride 103  --    BUN 10  --    Creatinine 0.8  --    Alkaline Phosphatase 121 91   ALT 58 H 18   AST 56 H 19   Total Bilirubin 0.3 0.4            URINALYSIS:  Recent Labs   Lab 08/02/22  1329   Color, UA Yellow   Specific Gravity, UA 1.005   pH, UA 6.0   Protein, UA 1+ A   Bacteria Occasional   Nitrite, UA Negative   Leukocytes, UA 3+ A   Hyaline Casts, UA 0        LIPIDS:  Recent Labs   Lab 11/03/21  0000   TSH 1.90   HDL 50   Cholesterol 203 H   Triglycerides 142   LDL Calculated 130 H   Non-HDL Cholesterol 153 H       TSH:  Recent Labs   Lab 11/03/21  0000   TSH 1.90            Recent Labs   Lab 11/03/21  0000   Hepatitis C Ab NON-REACTIVE       Assessment/Plan     Padmini Pollock is a 57 y.o.female with:    Hypertension, unspecified type  - Uncontrolled.  Rec low sodium diet.  Will cont Atenolol 25 mg/d.  Will inc Losartan to 25 mg daily. She will alert me for any concerns.  Call in 1 wk with BP log.    Liver cyst  - Stable. Fu by hepatology.  Sched for repeat U/S 1 yr.     Dysuria  - Improved on Macrobid.  Rec she leave urine specimen if has further Sx's.     Hemorrhoids, unspecified hemorrhoid type  - Rec adeq hydration.  Rec inc fiber in diet.  Rec stool softener Qday-BID and Miralax prn. conservative management options for symptoms related to hemorrhoids. Irritation or itching can be treated with Lidocaine cream/ointment, over-the-counter Hydrocortisone suppositories, warm baths or soaking.     Other Orders  -     CBC Auto Differential; Future; Expected date: 01/13/2023  -     Comprehensive Metabolic Panel; Future; Expected date: 01/13/2023  -     T4, Free; Future; Expected date: 01/13/2023  -     TSH; Future; Expected date: 01/13/2023  -     Lipid Panel; Future; Expected date: 01/13/2023         Chronic conditions status updated as per HPI.  Other than changes above, cont current medications and maintain follow up with specialists.  Follow up in about 3 months (around 4/13/2023) for fu chronic issues or sooner if needed.      Elvia Chacko MD  [facility]                Answers submitted by the patient for this visit:  High  Blood Pressure Questionnaire (Submitted on 1/13/2023)  Chief Complaint: Hypertension  Chronicity: recurrent  Onset: more than 1 year ago  Progression since onset: rapidly improving  Condition status: controlled  anxiety: Yes  blurred vision: No  malaise/fatigue: Yes  orthopnea: No  peripheral edema: No  PND: No  sweats: No  Agents associated with hypertension: estrogens  CAD risks: dyslipidemia, stress  Compliance problems: no compliance problems  Past treatments: nothing

## 2023-01-13 NOTE — LETTER
January 13, 2023      [facility]  [address]  [facility] [address]  Phone: [phone]  Fax: [phone]       Patient: Padmini Pollock   YOB: 1965  Date of Visit: 01/13/2023    To Whom It May Concern:    Homer Pollock  was at [facility] on 01/13/2023. The patient may return to work/school on 01/13/2023 with no restrictions. If you have any questions or concerns, or if I can be of further assistance, please do not hesitate to contact me.    Sincerely,    Sharla Jeong LPN   Office of Elvia Chacko MD

## 2023-02-09 ENCOUNTER — TELEPHONE (OUTPATIENT)
Dept: OBSTETRICS AND GYNECOLOGY | Facility: CLINIC | Age: 58
End: 2023-02-09
Payer: COMMERCIAL

## 2023-02-09 DIAGNOSIS — R82.90 URINE ABNORMALITY: Primary | ICD-10-CM

## 2023-02-09 NOTE — TELEPHONE ENCOUNTER
Dr Jimenez pt says she has a UTI w/ bladder spasms. She would like relief pt would like to discuss . #[phone]

## 2023-02-10 RX ORDER — PHENAZOPYRIDINE HYDROCHLORIDE 200 MG/1
200 TABLET, FILM COATED ORAL 3 TIMES DAILY PRN
Qty: 20 TABLET | Refills: 0 | Status: SHIPPED | OUTPATIENT
Start: 2023-02-10 | End: 2023-04-20 | Stop reason: ALTCHOICE

## 2023-02-10 RX ORDER — NITROFURANTOIN 25; 75 MG/1; MG/1
100 CAPSULE ORAL 2 TIMES DAILY
Qty: 14 CAPSULE | Refills: 0 | Status: SHIPPED | OUTPATIENT
Start: 2023-02-10 | End: 2023-02-17

## 2023-02-13 ENCOUNTER — TELEPHONE (OUTPATIENT)
Dept: OBSTETRICS AND GYNECOLOGY | Facility: CLINIC | Age: 58
End: 2023-02-13
Payer: COMMERCIAL

## 2023-02-14 ENCOUNTER — OFFICE VISIT (OUTPATIENT)
Dept: OBSTETRICS AND GYNECOLOGY | Facility: CLINIC | Age: 58
End: 2023-02-14
Payer: COMMERCIAL

## 2023-02-14 VITALS
SYSTOLIC BLOOD PRESSURE: 135 MMHG | DIASTOLIC BLOOD PRESSURE: 82 MMHG | HEIGHT: 62 IN | BODY MASS INDEX: 26.69 KG/M2 | WEIGHT: 145.06 LBS

## 2023-02-14 DIAGNOSIS — N39.0 RECURRENT UTI: Primary | ICD-10-CM

## 2023-02-14 DIAGNOSIS — N89.8 VAGINAL DISCHARGE: ICD-10-CM

## 2023-02-14 PROCEDURE — 4010F PR ACE/ARB THEARPY RXD/TAKEN: ICD-10-PCS | Mod: CPTII,S$GLB,, | Performed by: STUDENT IN AN ORGANIZED HEALTH CARE EDUCATION/TRAINING PROGRAM

## 2023-02-14 PROCEDURE — 3079F PR MOST RECENT DIASTOLIC BLOOD PRESSURE 80-89 MM HG: ICD-10-PCS | Mod: CPTII,S$GLB,, | Performed by: STUDENT IN AN ORGANIZED HEALTH CARE EDUCATION/TRAINING PROGRAM

## 2023-02-14 PROCEDURE — 1160F PR REVIEW ALL MEDS BY PRESCRIBER/CLIN PHARMACIST DOCUMENTED: ICD-10-PCS | Mod: CPTII,S$GLB,, | Performed by: STUDENT IN AN ORGANIZED HEALTH CARE EDUCATION/TRAINING PROGRAM

## 2023-02-14 PROCEDURE — 3008F PR BODY MASS INDEX (BMI) DOCUMENTED: ICD-10-PCS | Mod: CPTII,S$GLB,, | Performed by: STUDENT IN AN ORGANIZED HEALTH CARE EDUCATION/TRAINING PROGRAM

## 2023-02-14 PROCEDURE — 1160F RVW MEDS BY RX/DR IN RCRD: CPT | Mod: CPTII,S$GLB,, | Performed by: STUDENT IN AN ORGANIZED HEALTH CARE EDUCATION/TRAINING PROGRAM

## 2023-02-14 PROCEDURE — 1159F MED LIST DOCD IN RCRD: CPT | Mod: CPTII,S$GLB,, | Performed by: STUDENT IN AN ORGANIZED HEALTH CARE EDUCATION/TRAINING PROGRAM

## 2023-02-14 PROCEDURE — 99999 PR PBB SHADOW E&M-EST. PATIENT-LVL III: ICD-10-PCS | Mod: PBBFAC,,, | Performed by: STUDENT IN AN ORGANIZED HEALTH CARE EDUCATION/TRAINING PROGRAM

## 2023-02-14 PROCEDURE — 3079F DIAST BP 80-89 MM HG: CPT | Mod: CPTII,S$GLB,, | Performed by: STUDENT IN AN ORGANIZED HEALTH CARE EDUCATION/TRAINING PROGRAM

## 2023-02-14 PROCEDURE — 3075F PR MOST RECENT SYSTOLIC BLOOD PRESS GE 130-139MM HG: ICD-10-PCS | Mod: CPTII,S$GLB,, | Performed by: STUDENT IN AN ORGANIZED HEALTH CARE EDUCATION/TRAINING PROGRAM

## 2023-02-14 PROCEDURE — 1159F PR MEDICATION LIST DOCUMENTED IN MEDICAL RECORD: ICD-10-PCS | Mod: CPTII,S$GLB,, | Performed by: STUDENT IN AN ORGANIZED HEALTH CARE EDUCATION/TRAINING PROGRAM

## 2023-02-14 PROCEDURE — 99213 PR OFFICE/OUTPT VISIT, EST, LEVL III, 20-29 MIN: ICD-10-PCS | Mod: S$GLB,,, | Performed by: STUDENT IN AN ORGANIZED HEALTH CARE EDUCATION/TRAINING PROGRAM

## 2023-02-14 PROCEDURE — 99999 PR PBB SHADOW E&M-EST. PATIENT-LVL III: CPT | Mod: PBBFAC,,, | Performed by: STUDENT IN AN ORGANIZED HEALTH CARE EDUCATION/TRAINING PROGRAM

## 2023-02-14 PROCEDURE — 81514 NFCT DS BV&VAGINITIS DNA ALG: CPT | Performed by: STUDENT IN AN ORGANIZED HEALTH CARE EDUCATION/TRAINING PROGRAM

## 2023-02-14 PROCEDURE — 99213 OFFICE O/P EST LOW 20 MIN: CPT | Mod: S$GLB,,, | Performed by: STUDENT IN AN ORGANIZED HEALTH CARE EDUCATION/TRAINING PROGRAM

## 2023-02-14 PROCEDURE — 3075F SYST BP GE 130 - 139MM HG: CPT | Mod: CPTII,S$GLB,, | Performed by: STUDENT IN AN ORGANIZED HEALTH CARE EDUCATION/TRAINING PROGRAM

## 2023-02-14 PROCEDURE — 4010F ACE/ARB THERAPY RXD/TAKEN: CPT | Mod: CPTII,S$GLB,, | Performed by: STUDENT IN AN ORGANIZED HEALTH CARE EDUCATION/TRAINING PROGRAM

## 2023-02-14 PROCEDURE — 3008F BODY MASS INDEX DOCD: CPT | Mod: CPTII,S$GLB,, | Performed by: STUDENT IN AN ORGANIZED HEALTH CARE EDUCATION/TRAINING PROGRAM

## 2023-02-14 RX ORDER — NITROFURANTOIN MACROCRYSTALS 50 MG/1
50 CAPSULE ORAL NIGHTLY PRN
Qty: 30 CAPSULE | Refills: 11 | Status: SHIPPED | OUTPATIENT
Start: 2023-02-14 | End: 2023-02-24

## 2023-02-14 NOTE — PROGRESS NOTES
"Chief Complaint: Bladder discomfort     HPI:      Padmini Pollock is a 57 y.o.  who presents today for bladder discomfort and vaginal irritation/discharge. Pt reports that she has had more UTIs recently than she ever had in the past. Urinates after intercourse. She recently was treated for a UTI but has continued to have bladder pain/pressure and sometimes dysuria still, as well. She denies hematuria. H/o hysterectomy. Denies bowel movement changes or other GI sxs. No further complaints today. Denies F/C, N/V, CP, SOB.    Physical Exam:      PHYSICAL EXAM:  /82   Ht 5' 2" (1.575 m)   Wt 65.8 kg (145 lb 1 oz)   BMI 26.53 kg/m²   Body mass index is 26.53 kg/m².     APPEARANCE: Well nourished, well developed, in no acute distress.  PELVIC:  External genitalia and urethra within normal limits. Vagina without lesions, with thin white/yellow discharge, without erythema, without ulcers.  Vaginal cuff normal in appearance with no lesions. Uterus:  absent .No adnexal masses or tenderness palpated.    Assessment/Plan:     Recurrent UTI  -     Cancel: CULTURE, URINE  -     nitrofurantoin (MACRODANTIN) 50 MG capsule; Take 1 capsule (50 mg total) by mouth nightly as needed (intercourse). Take evening of and next morning  Dispense: 30 capsule; Refill: 11    Vaginal discharge  -     Cancel: Vaginosis Screen by DNA Probe  -     VAGINOSIS SCREEN BY DNA PROBE      - repeat urine culture today  - discussed d-mannose supplement to help suppress UTIs  - OTC azo to assist with bladder spasms/irritation  - Rx prophylactic macrobid with intercourse  - affirm collected 2/2 vaginal discharge  - RTC for annual when due or sooner if needed       Opal Jimenez MD  Obstetrics and Gynecology  [facility]      "

## 2023-02-15 ENCOUNTER — PATIENT MESSAGE (OUTPATIENT)
Dept: OBSTETRICS AND GYNECOLOGY | Facility: CLINIC | Age: 58
End: 2023-02-15
Payer: COMMERCIAL

## 2023-02-15 LAB
BACTERIAL VAGINOSIS DNA: POSITIVE
CANDIDA GLABRATA DNA: NEGATIVE
CANDIDA KRUSEI DNA: NEGATIVE
CANDIDA RRNA VAG QL PROBE: NEGATIVE
T VAGINALIS RRNA GENITAL QL PROBE: NEGATIVE

## 2023-02-16 DIAGNOSIS — B96.89 BV (BACTERIAL VAGINOSIS): Primary | ICD-10-CM

## 2023-02-16 DIAGNOSIS — N76.0 BV (BACTERIAL VAGINOSIS): Primary | ICD-10-CM

## 2023-02-16 RX ORDER — METRONIDAZOLE 500 MG/1
500 TABLET ORAL 2 TIMES DAILY
Qty: 14 TABLET | Refills: 0 | Status: SHIPPED | OUTPATIENT
Start: 2023-02-16 | End: 2023-04-20 | Stop reason: ALTCHOICE

## 2023-02-23 ENCOUNTER — PATIENT OUTREACH (OUTPATIENT)
Dept: ADMINISTRATIVE | Facility: HOSPITAL | Age: 58
End: 2023-02-23
Payer: COMMERCIAL

## 2023-02-23 ENCOUNTER — PATIENT MESSAGE (OUTPATIENT)
Dept: ADMINISTRATIVE | Facility: HOSPITAL | Age: 58
End: 2023-02-23
Payer: COMMERCIAL

## 2023-02-23 NOTE — PROGRESS NOTES
Patient due for the following    Pneumococcal Vaccines (Age 0-64) (1 - PCV)    Hemoglobin A1c (Diabetic Prevention Screening)     Shingles Vaccine (1 of 2)    COVID-19 Vaccine (3 - Booster for Pfizer series)    Influenza Vaccine (1)    Mammogram       Immunizations: reviewed and updated   Care Everywhere: triggered  Care Teams: up to date  Outreach: completed

## 2023-03-09 ENCOUNTER — PATIENT MESSAGE (OUTPATIENT)
Dept: OBSTETRICS AND GYNECOLOGY | Facility: CLINIC | Age: 58
End: 2023-03-09
Payer: COMMERCIAL

## 2023-03-15 ENCOUNTER — PATIENT MESSAGE (OUTPATIENT)
Dept: OBSTETRICS AND GYNECOLOGY | Facility: CLINIC | Age: 58
End: 2023-03-15
Payer: COMMERCIAL

## 2023-03-16 ENCOUNTER — TELEPHONE (OUTPATIENT)
Dept: OBSTETRICS AND GYNECOLOGY | Facility: CLINIC | Age: 58
End: 2023-03-16

## 2023-03-16 DIAGNOSIS — Z12.31 BREAST CANCER SCREENING BY MAMMOGRAM: Primary | ICD-10-CM

## 2023-03-28 ENCOUNTER — OFFICE VISIT (OUTPATIENT)
Dept: OBSTETRICS AND GYNECOLOGY | Facility: CLINIC | Age: 58
End: 2023-03-28
Payer: COMMERCIAL

## 2023-03-28 VITALS
WEIGHT: 146.63 LBS | BODY MASS INDEX: 26.98 KG/M2 | SYSTOLIC BLOOD PRESSURE: 120 MMHG | HEIGHT: 62 IN | DIASTOLIC BLOOD PRESSURE: 90 MMHG

## 2023-03-28 DIAGNOSIS — Z08 ENCOUNTER FOR FOLLOW-UP SURVEILLANCE OF CERVICAL CANCER: Primary | ICD-10-CM

## 2023-03-28 DIAGNOSIS — Z12.4 ENCOUNTER FOR SCREENING FOR CERVICAL CANCER: ICD-10-CM

## 2023-03-28 DIAGNOSIS — Z11.51 ENCOUNTER FOR SCREENING FOR HUMAN PAPILLOMAVIRUS (HPV): ICD-10-CM

## 2023-03-28 DIAGNOSIS — Z85.41 ENCOUNTER FOR FOLLOW-UP SURVEILLANCE OF CERVICAL CANCER: Primary | ICD-10-CM

## 2023-03-28 PROCEDURE — 99396 PR PREVENTIVE VISIT,EST,40-64: ICD-10-PCS | Mod: S$GLB,,, | Performed by: STUDENT IN AN ORGANIZED HEALTH CARE EDUCATION/TRAINING PROGRAM

## 2023-03-28 PROCEDURE — 99999 PR PBB SHADOW E&M-EST. PATIENT-LVL III: ICD-10-PCS | Mod: PBBFAC,,, | Performed by: STUDENT IN AN ORGANIZED HEALTH CARE EDUCATION/TRAINING PROGRAM

## 2023-03-28 PROCEDURE — 1160F RVW MEDS BY RX/DR IN RCRD: CPT | Mod: CPTII,S$GLB,, | Performed by: STUDENT IN AN ORGANIZED HEALTH CARE EDUCATION/TRAINING PROGRAM

## 2023-03-28 PROCEDURE — 3008F PR BODY MASS INDEX (BMI) DOCUMENTED: ICD-10-PCS | Mod: CPTII,S$GLB,, | Performed by: STUDENT IN AN ORGANIZED HEALTH CARE EDUCATION/TRAINING PROGRAM

## 2023-03-28 PROCEDURE — 3008F BODY MASS INDEX DOCD: CPT | Mod: CPTII,S$GLB,, | Performed by: STUDENT IN AN ORGANIZED HEALTH CARE EDUCATION/TRAINING PROGRAM

## 2023-03-28 PROCEDURE — 1159F MED LIST DOCD IN RCRD: CPT | Mod: CPTII,S$GLB,, | Performed by: STUDENT IN AN ORGANIZED HEALTH CARE EDUCATION/TRAINING PROGRAM

## 2023-03-28 PROCEDURE — 4010F PR ACE/ARB THEARPY RXD/TAKEN: ICD-10-PCS | Mod: CPTII,S$GLB,, | Performed by: STUDENT IN AN ORGANIZED HEALTH CARE EDUCATION/TRAINING PROGRAM

## 2023-03-28 PROCEDURE — 3074F SYST BP LT 130 MM HG: CPT | Mod: CPTII,S$GLB,, | Performed by: STUDENT IN AN ORGANIZED HEALTH CARE EDUCATION/TRAINING PROGRAM

## 2023-03-28 PROCEDURE — 3080F DIAST BP >= 90 MM HG: CPT | Mod: CPTII,S$GLB,, | Performed by: STUDENT IN AN ORGANIZED HEALTH CARE EDUCATION/TRAINING PROGRAM

## 2023-03-28 PROCEDURE — 1159F PR MEDICATION LIST DOCUMENTED IN MEDICAL RECORD: ICD-10-PCS | Mod: CPTII,S$GLB,, | Performed by: STUDENT IN AN ORGANIZED HEALTH CARE EDUCATION/TRAINING PROGRAM

## 2023-03-28 PROCEDURE — 1160F PR REVIEW ALL MEDS BY PRESCRIBER/CLIN PHARMACIST DOCUMENTED: ICD-10-PCS | Mod: CPTII,S$GLB,, | Performed by: STUDENT IN AN ORGANIZED HEALTH CARE EDUCATION/TRAINING PROGRAM

## 2023-03-28 PROCEDURE — 3074F PR MOST RECENT SYSTOLIC BLOOD PRESSURE < 130 MM HG: ICD-10-PCS | Mod: CPTII,S$GLB,, | Performed by: STUDENT IN AN ORGANIZED HEALTH CARE EDUCATION/TRAINING PROGRAM

## 2023-03-28 PROCEDURE — 99396 PREV VISIT EST AGE 40-64: CPT | Mod: S$GLB,,, | Performed by: STUDENT IN AN ORGANIZED HEALTH CARE EDUCATION/TRAINING PROGRAM

## 2023-03-28 PROCEDURE — 99999 PR PBB SHADOW E&M-EST. PATIENT-LVL III: CPT | Mod: PBBFAC,,, | Performed by: STUDENT IN AN ORGANIZED HEALTH CARE EDUCATION/TRAINING PROGRAM

## 2023-03-28 PROCEDURE — 4010F ACE/ARB THERAPY RXD/TAKEN: CPT | Mod: CPTII,S$GLB,, | Performed by: STUDENT IN AN ORGANIZED HEALTH CARE EDUCATION/TRAINING PROGRAM

## 2023-03-28 PROCEDURE — 3080F PR MOST RECENT DIASTOLIC BLOOD PRESSURE >= 90 MM HG: ICD-10-PCS | Mod: CPTII,S$GLB,, | Performed by: STUDENT IN AN ORGANIZED HEALTH CARE EDUCATION/TRAINING PROGRAM

## 2023-03-28 RX ORDER — NITROFURANTOIN MACROCRYSTALS 50 MG/1
CAPSULE ORAL
COMMUNITY
Start: 2023-03-19

## 2023-03-28 NOTE — PROGRESS NOTES
Chief Complaint: Well Woman Exam     HPI:      Padmini Pollock is a 58 y.o.  who presents today for well woman exam.  LMP: No LMP recorded. Patient has had a hysterectomy.  No issues, problems, or complaints. Interested in discontinuing HRT due to family history of breast cancer in her mother. No other complaints today. Specifically, patient denies vaginal bleeding, discharge, pelvic pain, urinary problems, or changes in appetite.     Previous Pap: NILM, HPV negative (3/22/22)  Previous Mammogram: BiRads: 1 (3/2022) - had MMG today [facility]   Most Recent Dexa: WNL (), Repeat in   Most Recent Colonoscopy: WNL (2022), Repeat in     Past Medical History:   Diagnosis Date    Depression     Endometriosis     Family history of breast cancer in mother     Hx of migraine headaches     Hyperlipidemia     Hyperlipidemia 2021    Hypertension 2021    Menopause     Prolapsing mitral leaflet syndrome      Past Surgical History:   Procedure Laterality Date     SECTION      x 2    COLONOSCOPY  2016    Normal     HYSTERECTOMY  2011    Supra Cervical w/ Dr Jamie Mayorga (Outpt. facility possibly [facility])     Social History     Socioeconomic History    Marital status:    Tobacco Use    Smoking status: Never    Smokeless tobacco: Never   Substance and Sexual Activity    Alcohol use: Yes     Comment: rare - once every 6 mos    Drug use: Never    Sexual activity: Not Currently     Birth control/protection: Surgical     Comment: :      Supracervical Hyst       Family History   Problem Relation Age of Onset    Heart attack Father     Heart disease Father 65    Hypertension Father     Breast cancer Mother     Hypertension Mother     No Known Problems Sister     Colon cancer Neg Hx     Ovarian cancer Neg Hx     Diabetes Neg Hx      Review of patient's allergies indicates:  No Known Allergies    OB History          2    Para   2    Term   2            AB        Living   2     "     SAB        IAB        Ectopic        Multiple        Live Births   2               Physical Exam:      PHYSICAL EXAM:  BP (!) 120/90 (BP Location: Left arm, Patient Position: Sitting, BP Method: Medium (Manual))   Ht 5' 2" (1.575 m)   Wt 66.5 kg (146 lb 9.7 oz)   BMI 26.81 kg/m²   Body mass index is 26.81 kg/m².     APPEARANCE: Well nourished, well developed, in no acute distress.  PSYCH: Appropriate mood and affect.  SKIN: No acne or hirsutism  NECK: Neck symmetric without masses  NODES: No inguinal, axillary, or supraclavicular lymph node enlargement  ABDOMEN: Soft.  No tenderness or masses.    CARDIOVASCULAR: No edema of peripheral extremities  BREASTS: Symmetrical, no visible skin lesions. No palpable masses. No nipple discharge bilaterally.  PELVIC: Normal external genitalia without lesions.  Normal hair distribution.  Adequate perineal body, normal urethral meatus.  Vagina moist and smooth. Without lesions. Without discharge.  Cervix pink, without lesions, discharge or tenderness.  No significant cystocele or rectocele.  Bimanual exam shows no midline or adnexal masses.  Uterus surgically absent.    Assessment/Plan:     Encounter for follow-up surveillance of cervical cancer    Encounter for screening for cervical cancer  -     Liquid-Based Pap Smear, Screening    Encounter for screening for human papillomavirus (HPV)  -     HPV High Risk Genotypes, PCR    - pelvic exam normal  - pap/hpv collected  - [facility] at [facility] this morning, will follow up  - desiring to d/c HRT, will halve her dose for now  - DXA/colonoscopy both UTD  - routine labs with PCP    Follow up in about 1 year (around 3/28/2024) for annual exam.    Counseling:     Patient was counseled today on current ASCCP pap guidelines, the recommendation for yearly physical exams, safe driving habits, breast self awareness and annual mammograms. She is to see her PCP for other health maintenance.     Use of the [employer] [facility] Patient Portal discussed and " encouraged during today's visit.     Opal Jimenez MD  Obstetrics and Gynecology  [facility]

## 2023-03-29 ENCOUNTER — PATIENT MESSAGE (OUTPATIENT)
Dept: OBSTETRICS AND GYNECOLOGY | Facility: CLINIC | Age: 58
End: 2023-03-29
Payer: COMMERCIAL

## 2023-03-30 ENCOUNTER — PATIENT MESSAGE (OUTPATIENT)
Dept: OBSTETRICS AND GYNECOLOGY | Facility: CLINIC | Age: 58
End: 2023-03-30
Payer: COMMERCIAL

## 2023-04-01 LAB — BCS RECOMMENDATION EXT: NORMAL

## 2023-04-12 ENCOUNTER — PATIENT OUTREACH (OUTPATIENT)
Dept: ADMINISTRATIVE | Facility: HOSPITAL | Age: 58
End: 2023-04-12
Payer: COMMERCIAL

## 2023-04-12 NOTE — PROGRESS NOTES
Health Maintenance Due   Topic Date Due    Pneumococcal Vaccines (Age 0-64) (1 - PCV) Never done    Hemoglobin A1c (Diabetic Prevention Screening)  Never done    Shingles Vaccine (1 of 2) Never done    COVID-19 Vaccine (3 - Booster for Pfizer series) 05/12/2021    Influenza Vaccine (1) 09/01/2022        Chart review done.   HM updated.   Immunizations reviewed & updated.   Care Everywhere updated.   [facility] reviewed

## 2023-04-12 NOTE — PROGRESS NOTES
I sent pt a my chart message -  I reviewed your Mammogram -   Some skin folds are noted incidentally.  Axillary lymph nodes are noted incidentally.     Scattered densities are present in both breasts.  There are benign calcifications in both breasts.  There is a mole marker on both breasts.     No significant masses, calcifications, or other findings are seen in either breast.     There has been no significant interval change.   IMPRESSION: BENIGN   There is no mammographic evidence of malignancy. A 1 year screening mammogram is recommended.   Dr. CHAMPION

## 2023-04-20 ENCOUNTER — OFFICE VISIT (OUTPATIENT)
Dept: PRIMARY CARE CLINIC | Facility: CLINIC | Age: 58
End: 2023-04-20
Payer: COMMERCIAL

## 2023-04-20 VITALS
DIASTOLIC BLOOD PRESSURE: 84 MMHG | RESPIRATION RATE: 16 BRPM | HEIGHT: 62 IN | BODY MASS INDEX: 27.26 KG/M2 | SYSTOLIC BLOOD PRESSURE: 132 MMHG | TEMPERATURE: 98 F | HEART RATE: 75 BPM | WEIGHT: 148.13 LBS | OXYGEN SATURATION: 98 %

## 2023-04-20 DIAGNOSIS — K76.0 FATTY LIVER: ICD-10-CM

## 2023-04-20 DIAGNOSIS — E66.3 OVERWEIGHT (BMI 25.0-29.9): ICD-10-CM

## 2023-04-20 DIAGNOSIS — K76.89 LIVER CYST: ICD-10-CM

## 2023-04-20 DIAGNOSIS — Z13.220 SCREENING FOR LIPOID DISORDERS: ICD-10-CM

## 2023-04-20 DIAGNOSIS — Z00.00 NORMAL PHYSICAL EXAM, ROUTINE: ICD-10-CM

## 2023-04-20 DIAGNOSIS — I10 HYPERTENSION, UNSPECIFIED TYPE: Primary | ICD-10-CM

## 2023-04-20 DIAGNOSIS — J45.991 COUGH VARIANT ASTHMA: ICD-10-CM

## 2023-04-20 PROCEDURE — 3079F DIAST BP 80-89 MM HG: CPT | Mod: CPTII,S$GLB,, | Performed by: INTERNAL MEDICINE

## 2023-04-20 PROCEDURE — 3008F PR BODY MASS INDEX (BMI) DOCUMENTED: ICD-10-PCS | Mod: CPTII,S$GLB,, | Performed by: INTERNAL MEDICINE

## 2023-04-20 PROCEDURE — 1159F MED LIST DOCD IN RCRD: CPT | Mod: CPTII,S$GLB,, | Performed by: INTERNAL MEDICINE

## 2023-04-20 PROCEDURE — 3008F BODY MASS INDEX DOCD: CPT | Mod: CPTII,S$GLB,, | Performed by: INTERNAL MEDICINE

## 2023-04-20 PROCEDURE — 99999 PR PBB SHADOW E&M-EST. PATIENT-LVL IV: ICD-10-PCS | Mod: PBBFAC,,, | Performed by: INTERNAL MEDICINE

## 2023-04-20 PROCEDURE — 99214 PR OFFICE/OUTPT VISIT, EST, LEVL IV, 30-39 MIN: ICD-10-PCS | Mod: S$GLB,,, | Performed by: INTERNAL MEDICINE

## 2023-04-20 PROCEDURE — 4010F PR ACE/ARB THEARPY RXD/TAKEN: ICD-10-PCS | Mod: CPTII,S$GLB,, | Performed by: INTERNAL MEDICINE

## 2023-04-20 PROCEDURE — 1160F PR REVIEW ALL MEDS BY PRESCRIBER/CLIN PHARMACIST DOCUMENTED: ICD-10-PCS | Mod: CPTII,S$GLB,, | Performed by: INTERNAL MEDICINE

## 2023-04-20 PROCEDURE — 99214 OFFICE O/P EST MOD 30 MIN: CPT | Mod: S$GLB,,, | Performed by: INTERNAL MEDICINE

## 2023-04-20 PROCEDURE — 4010F ACE/ARB THERAPY RXD/TAKEN: CPT | Mod: CPTII,S$GLB,, | Performed by: INTERNAL MEDICINE

## 2023-04-20 PROCEDURE — 3075F SYST BP GE 130 - 139MM HG: CPT | Mod: CPTII,S$GLB,, | Performed by: INTERNAL MEDICINE

## 2023-04-20 PROCEDURE — 3079F PR MOST RECENT DIASTOLIC BLOOD PRESSURE 80-89 MM HG: ICD-10-PCS | Mod: CPTII,S$GLB,, | Performed by: INTERNAL MEDICINE

## 2023-04-20 PROCEDURE — 1159F PR MEDICATION LIST DOCUMENTED IN MEDICAL RECORD: ICD-10-PCS | Mod: CPTII,S$GLB,, | Performed by: INTERNAL MEDICINE

## 2023-04-20 PROCEDURE — 1160F RVW MEDS BY RX/DR IN RCRD: CPT | Mod: CPTII,S$GLB,, | Performed by: INTERNAL MEDICINE

## 2023-04-20 PROCEDURE — 99999 PR PBB SHADOW E&M-EST. PATIENT-LVL IV: CPT | Mod: PBBFAC,,, | Performed by: INTERNAL MEDICINE

## 2023-04-20 PROCEDURE — 3075F PR MOST RECENT SYSTOLIC BLOOD PRESS GE 130-139MM HG: ICD-10-PCS | Mod: CPTII,S$GLB,, | Performed by: INTERNAL MEDICINE

## 2023-04-20 RX ORDER — LOSARTAN POTASSIUM 25 MG/1
25 TABLET ORAL DAILY
Qty: 90 TABLET | Refills: 1 | Status: SHIPPED | OUTPATIENT
Start: 2023-04-20 | End: 2023-08-09 | Stop reason: SDUPTHER

## 2023-04-20 RX ORDER — ALBUTEROL SULFATE 90 UG/1
2 AEROSOL, METERED RESPIRATORY (INHALATION) EVERY 6 HOURS PRN
Qty: 18 G | Refills: 0
Start: 2023-04-20 | End: 2024-02-27 | Stop reason: SDUPTHER

## 2023-04-20 RX ORDER — CHOLECALCIFEROL (VITAMIN D3) 25 MCG
1000 TABLET ORAL DAILY
Qty: 1 TABLET | Refills: 0
Start: 2023-04-20

## 2023-04-20 NOTE — PROGRESS NOTES
"[facility] Note    Chief Complaint      Chief Complaint   Patient presents with    Hypertension     Dyst number       History of Present Illness      Padmini Pollock is a 58 y.o. . female   with Heptic cyst, Simple Renal cyst, GERD, HLD, Postmenopausal on HRT, HTN, Colon Polyps presents to fu  well visit. Referred by Mom, Christianne . Last visit - 7/1/22    HTN - Controlled  on Atenolol  25 mg QHS and Losartan 25 mg 1 tab po QAm. Rec low sodium diet.      Recurrent Urinary Sx's -  She is s/p partial Hys. She is on HRT.  She never fu with . She is on Macrobid prn per OB.  Doing better. No recent UA/Ucx.      Fatty Liver/Hepatic Cysts - Abd U/S -5/13/22 - Numerous simple and mildly complex hepatic cysts. +Fatty liver. Rec limit tylenol and alcohol.  Rec diet and exercise for wt loss.  As discussed at visit there is a potential for cirrhosis. Her Liver functions are back to normal. Fu by Dr. Avelar.  Elastography - 10/13/22 Fibrosis stage 0-1 Steatosis Grade < 1.       Hepatic cyst -  Fu by Hepatology, Dr. Avelar.  MRI abd - 11/2/22 - Multiple renal and hepatic cysts.   Will repeat U/s in 1 yr.      COVID + 12/2020 and 12/27/21  -  Has not needed Albuterol prn.      Simple Renal cyst - Abd U/S - 5/13/22 -  Multiple cysts.  Lgst at the lower pole  a 4.3 cm cyst.     GERD - Rec reflux prec. Takes tums prn rarely.      HLD - Pt on Pravastatin 20 mg QHS. She prev had labs with Cards, Dr. Dobson.  Prefer not to go back to him.      Postmenopausal on HRT - Pt on estrace per OB.       Colon Polyps - Fu by  Dr. Mayorga.       Cough variant asthma - saw Dr. Kovacs in past. She has not needed inhaler often.   She reports PFT's were "borderline".    Overweight - BMI - 27.1 - up 7.5 lb since July. Rec low carb diet and exercise.       Hemorrhoids - Rec adeq hydration.  Rec inc fiber in diet.  Rec stool softener Qday-BID and Miralax prn. conservative management options for symptoms related to hemorrhoids. Irritation or " itching can be treated with Lidocaine cream/ointment, over-the-counter Hydrocortisone suppositories, warm baths or soaking.      HCM - Flu - 2020 - due;  Tdap -19;  PCV 13 - none;  PVX 23 - none- pt defers;   Shingrix - none;  COVID - 19 Vaccine (Pfizer)  #1 21; #2 3/17/21; and # 3 - due; MGM -  23 - at [facility]- repeat 1 yr;  DEXA - 22 - neg.;  PAP - 3/22/22- neg; Hep C Screen - 11/3/21 - neg.;  HIV Screen - 11/3/21 - neg.; C-scope - 22 - hemorrhoids, angioectasia, polyp- repeat 5 yrs;    Prev PCP - none;  Ob/GYN - Dr. Jimenez; Prev Cards - Dr. Dobson; GI - Dr. Mayorga; Pulm - Dr. Kovacs; Well visit - 22    Patient Care Team:  Elvia Chacko MD as PCP - General (Internal Medicine)  Kenyatta Ramirez MA as Care Coordinator  Jose Roberto Mayorga MD as Consulting Physician (Gastroenterology)     Health Maintenance:  Immunization History   Administered Date(s) Administered    COVID-19, MRNA, LN-S, PF (Pfizer) (Purple Cap) 2021, 2021    Influenza - Quadrivalent - MDCK - PF 10/06/2018, 2019    Influenza - Quadrivalent - PF *Preferred* (6 months and older) 2020    Influenza - Trivalent (ADULT) 2015    Tdap 2019      Health Maintenance   Topic Date Due    Mammogram  2024    DEXA Scan  2024    Lipid Panel  2026    TETANUS VACCINE  2029    Hepatitis C Screening  Completed        Past Medical History:  Past Medical History:   Diagnosis Date    Depression     Endometriosis     Family history of breast cancer in mother     Hx of migraine headaches     Hyperlipidemia     Hyperlipidemia 2021    Hypertension 2021    Menopause     Prolapsing mitral leaflet syndrome        Past Surgical History:   has a past surgical history that includes  section; Hysterectomy (); and Colonoscopy ().    Family History:  family history includes Breast cancer in her mother; Heart attack in her father; Heart disease (age of onset: 65) in  her father; Hypertension in her father and mother; No Known Problems in her sister.     Social History:  Social History     Tobacco Use    Smoking status: Never     Passive exposure: Never    Smokeless tobacco: Never   Substance Use Topics    Alcohol use: Yes     Comment: rare - once every 6 mos    Drug use: Never       Review of Systems   Constitutional:  Negative for chills, diaphoresis and fever.   HENT:  Negative for nasal congestion and sore throat.    Eyes:  Negative for visual disturbance.        Wears glasses   Respiratory:  Negative for cough and shortness of breath.    Cardiovascular:  Negative for chest pain, palpitations and leg swelling.   Gastrointestinal:  Negative for abdominal pain, constipation, diarrhea, nausea and vomiting.   Endocrine: Negative for cold intolerance, heat intolerance and polydipsia.   Genitourinary:  Negative for bladder incontinence, dysuria and frequency.   Musculoskeletal:  Negative for arthralgias and myalgias.   Neurological:  Negative for dizziness and headaches.   Psychiatric/Behavioral:  Negative for dysphoric mood. The patient is not nervous/anxious.       Medications:    Current Outpatient Medications:     atenoloL (TENORMIN) 25 MG tablet, Take 1 tablet (25 mg total) by mouth once daily., Disp: 90 tablet, Rfl: 1    estradioL (ESTRACE) 1 MG tablet, Take 1 tablet by mouth once daily, Disp: 90 tablet, Rfl: 1    pravastatin (PRAVACHOL) 20 MG tablet, Take 1 tablet (20 mg total) by mouth once daily., Disp: 90 tablet, Rfl: 1    albuterol (PROAIR HFA) 90 mcg/actuation inhaler, Inhale 2 puffs into the lungs every 6 (six) hours as needed for Wheezing. Rescue, Disp: 18 g, Rfl: 0    losartan (COZAAR) 25 MG tablet, Take 1 tablet (25 mg total) by mouth once daily., Disp: 90 tablet, Rfl: 1    nitrofurantoin (MACRODANTIN) 50 MG capsule, TAKE 1 CAPSULE BY MOUTH NIGHTLY AS NEEDED FOR (INTERCOURSE). TAKE EVENING OF AND NEXT MORNING, Disp: , Rfl:     vitamin D (VITAMIN D3) 1000 units Tab,  "Take 1 tablet (1,000 Units total) by mouth once daily., Disp: 1 tablet, Rfl: 0     Allergies:  Review of patient's allergies indicates:  No Known Allergies    Physical Exam      Vital Signs  Temp: 97.7 °F (36.5 °C)  Pulse: 75  Resp: 16  SpO2: 98 %  BP: 132/84  BP Location: Left arm  Pain Score: 0-No pain  Height and Weight  Height: 5' 2" (157.5 cm)  Weight: 67.2 kg (148 lb 2.4 oz)  BSA (Calculated - sq m): 1.71 sq meters  BMI (Calculated): 27.1  Weight in (lb) to have BMI = 25: 136.4             Physical Exam  Vitals reviewed.   Constitutional:       General: She is not in acute distress.     Appearance: Normal appearance. She is not ill-appearing, toxic-appearing or diaphoretic.   HENT:      Head: Normocephalic and atraumatic.      Left Ear: Tympanic membrane normal.      Ears:      Comments: Rt TM unable to fully visualize due to cerumen impaction - unable to fully remove.   Eyes:      Extraocular Movements: Extraocular movements intact.      Conjunctiva/sclera: Conjunctivae normal.      Pupils: Pupils are equal, round, and reactive to light.   Neck:      Vascular: No carotid bruit.   Cardiovascular:      Rate and Rhythm: Normal rate and regular rhythm.      Pulses: Normal pulses.      Heart sounds: Normal heart sounds.   Pulmonary:      Effort: No respiratory distress.      Breath sounds: Normal breath sounds. No wheezing.   Abdominal:      General: Bowel sounds are normal. There is no distension.      Palpations: Abdomen is soft.      Tenderness: There is no abdominal tenderness. There is no guarding or rebound.   Musculoskeletal:         General: Normal range of motion.   Skin:     General: Skin is warm.   Neurological:      General: No focal deficit present.      Mental Status: She is alert and oriented to person, place, and time.   Psychiatric:         Mood and Affect: Mood normal.         Behavior: Behavior normal.        Laboratory:  CBC:  Recent Labs   Lab 11/03/21  0000   WBC 7.1   RBC 4.27   Hemoglobin " 13.8   Hematocrit 40.6   Platelets 337   MCV 95.1   MCH 32.2   MCHC 33.9       CMP:  Recent Labs   Lab 11/03/21  0000 07/01/22  0820   Glucose 92  --    Calcium 9.4  --    Albumin  --  3.7   ALBUMIN 4.0  --    Total Protein 7.0 7.2   Sodium 142  --    Potassium 4.3  --    CO2 26  --    Chloride 103  --    BUN 10  --    Creatinine 0.8  --    Alkaline Phosphatase 121 91   ALT 58 H 18   AST 56 H 19   Total Bilirubin 0.3 0.4       URINALYSIS:  Recent Labs   Lab 08/02/22  1329   Color, UA Yellow   Specific Gravity, UA 1.005   pH, UA 6.0   Protein, UA 1+ A   Bacteria Occasional   Nitrite, UA Negative   Leukocytes, UA 3+ A   Hyaline Casts, UA 0        LIPIDS:  Recent Labs   Lab 11/03/21  0000   TSH 1.90   HDL 50   Cholesterol 203 H   Triglycerides 142   LDL Calculated 130 H   Non-HDL Cholesterol 153 H       TSH:  Recent Labs   Lab 11/03/21  0000   TSH 1.90            Recent Labs   Lab 11/03/21  0000   Hepatitis C Ab NON-REACTIVE       Assessment/Plan     Padmini Pollock is a 58 y.o.female with:    Hypertension, unspecified type  -     losartan (COZAAR) 25 MG tablet; Take 1 tablet (25 mg total) by mouth once daily.  Dispense: 90 tablet; Refill: 1  - Controlled.  Cont current.     Cough variant asthma  -     albuterol (PROAIR HFA) 90 mcg/actuation inhaler; Inhale 2 puffs into the lungs every 6 (six) hours as needed for Wheezing. Rescue  Dispense: 18 g; Refill: 0  - Stable.  Cont current regimen.    Liver cyst  - Stable.  Reassured. Pt planning for repeat imaging per Dr. Avelar.     Fatty liver  - Stable.  Cont current regimen. Cont to limit tylenol and alcohol.  Rec diet and exercise for wt loss.       Overweight (BMI 25.0-29.9)  - Rec diet and exercise as discussed for wt loss.      Other Orders  -     CBC Auto Differential; Future; Expected date: 04/20/2023  -     Comprehensive Metabolic Panel; Future; Expected date: 04/20/2023  -     TSH; Future; Expected date: 04/20/2023  -     Lipid Panel; Future; Expected date:  04/20/2023    Other orders  -     vitamin D (VITAMIN D3) 1000 units Tab; Take 1 tablet (1,000 Units total) by mouth once daily.  Dispense: 1 tablet; Refill: 0         Chronic conditions status updated as per HPI.  Other than changes above, cont current medications and maintain follow up with specialists.   Follow up in about 6 months (around 10/20/2023) for well visit or sooner if needed.      Elvia Chacko MD  [facility]

## 2023-04-20 NOTE — LETTER
April 20, 2023      [facility]  [address]  [facility] [address]  Phone: [phone]  Fax: [phone]       Patient: Padmini Pollock   YOB: 1965  Date of Visit: 04/20/2023    To Whom It May Concern:    Homer Pollock  was at [facility] on 04/20/2023. The patient may return to work/school on 04/21/2023 with no restrictions. If you have any questions or concerns, or if I can be of further assistance, please do not hesitate to contact me.    Sincerely,    Sharla Jeong LPN

## 2023-04-26 ENCOUNTER — LAB VISIT (OUTPATIENT)
Dept: LAB | Facility: HOSPITAL | Age: 58
End: 2023-04-26
Attending: INTERNAL MEDICINE
Payer: COMMERCIAL

## 2023-04-26 DIAGNOSIS — Z13.220 SCREENING FOR LIPOID DISORDERS: ICD-10-CM

## 2023-04-26 DIAGNOSIS — Z00.00 NORMAL PHYSICAL EXAM, ROUTINE: ICD-10-CM

## 2023-04-26 LAB
ALBUMIN SERPL BCP-MCNC: 3.5 G/DL (ref 3.5–5.2)
ALP SERPL-CCNC: 82 U/L (ref 55–135)
ALT SERPL W/O P-5'-P-CCNC: 16 U/L (ref 10–44)
ANION GAP SERPL CALC-SCNC: 6 MMOL/L (ref 8–16)
AST SERPL-CCNC: 20 U/L (ref 10–40)
BASOPHILS # BLD AUTO: 0.05 K/UL (ref 0–0.2)
BASOPHILS NFR BLD: 0.8 % (ref 0–1.9)
BILIRUB SERPL-MCNC: 0.4 MG/DL (ref 0.1–1)
BUN SERPL-MCNC: 16 MG/DL (ref 6–20)
CALCIUM SERPL-MCNC: 8.8 MG/DL (ref 8.7–10.5)
CHLORIDE SERPL-SCNC: 107 MMOL/L (ref 95–110)
CHOLEST SERPL-MCNC: 180 MG/DL (ref 120–199)
CHOLEST/HDLC SERPL: 3.5 {RATIO} (ref 2–5)
CO2 SERPL-SCNC: 28 MMOL/L (ref 23–29)
CREAT SERPL-MCNC: 0.9 MG/DL (ref 0.5–1.4)
DIFFERENTIAL METHOD: ABNORMAL
EOSINOPHIL # BLD AUTO: 0.1 K/UL (ref 0–0.5)
EOSINOPHIL NFR BLD: 0.8 % (ref 0–8)
ERYTHROCYTE [DISTWIDTH] IN BLOOD BY AUTOMATED COUNT: 12.3 % (ref 11.5–14.5)
EST. GFR  (NO RACE VARIABLE): >60 ML/MIN/1.73 M^2
GLUCOSE SERPL-MCNC: 81 MG/DL (ref 70–110)
HCT VFR BLD AUTO: 41.3 % (ref 37–48.5)
HDLC SERPL-MCNC: 51 MG/DL (ref 40–75)
HDLC SERPL: 28.3 % (ref 20–50)
HGB BLD-MCNC: 13.5 G/DL (ref 12–16)
IMM GRANULOCYTES # BLD AUTO: 0.01 K/UL (ref 0–0.04)
IMM GRANULOCYTES NFR BLD AUTO: 0.2 % (ref 0–0.5)
LDLC SERPL CALC-MCNC: 103 MG/DL (ref 63–159)
LYMPHOCYTES # BLD AUTO: 2.3 K/UL (ref 1–4.8)
LYMPHOCYTES NFR BLD: 38.6 % (ref 18–48)
MCH RBC QN AUTO: 31.4 PG (ref 27–31)
MCHC RBC AUTO-ENTMCNC: 32.7 G/DL (ref 32–36)
MCV RBC AUTO: 96 FL (ref 82–98)
MONOCYTES # BLD AUTO: 0.4 K/UL (ref 0.3–1)
MONOCYTES NFR BLD: 5.9 % (ref 4–15)
NEUTROPHILS # BLD AUTO: 3.2 K/UL (ref 1.8–7.7)
NEUTROPHILS NFR BLD: 53.7 % (ref 38–73)
NONHDLC SERPL-MCNC: 129 MG/DL
NRBC BLD-RTO: 0 /100 WBC
PLATELET # BLD AUTO: 296 K/UL (ref 150–450)
PMV BLD AUTO: 10.7 FL (ref 9.2–12.9)
POTASSIUM SERPL-SCNC: 4 MMOL/L (ref 3.5–5.1)
PROT SERPL-MCNC: 6.8 G/DL (ref 6–8.4)
RBC # BLD AUTO: 4.3 M/UL (ref 4–5.4)
SODIUM SERPL-SCNC: 141 MMOL/L (ref 136–145)
TRIGL SERPL-MCNC: 130 MG/DL (ref 30–150)
TSH SERPL DL<=0.005 MIU/L-ACNC: 1.25 UIU/ML (ref 0.4–4)
WBC # BLD AUTO: 5.98 K/UL (ref 3.9–12.7)

## 2023-04-26 PROCEDURE — 80053 COMPREHEN METABOLIC PANEL: CPT | Performed by: INTERNAL MEDICINE

## 2023-04-26 PROCEDURE — 80061 LIPID PANEL: CPT | Performed by: INTERNAL MEDICINE

## 2023-04-26 PROCEDURE — 84443 ASSAY THYROID STIM HORMONE: CPT | Performed by: INTERNAL MEDICINE

## 2023-04-26 PROCEDURE — 36415 COLL VENOUS BLD VENIPUNCTURE: CPT | Performed by: INTERNAL MEDICINE

## 2023-04-26 PROCEDURE — 85025 COMPLETE CBC W/AUTO DIFF WBC: CPT | Performed by: INTERNAL MEDICINE

## 2023-04-27 NOTE — PROGRESS NOTES
This is Dr. Moser, covering for Dr. Chacko:    Your blood count (CBC) is unremarkable.    Your sugar number (Glucose) is within normal limits.  Rest of your electrolytes are unremarkable.    Your kidney (BUN, Creatinine and GFT) function is unremarkable.   Your liver (AST, ALT) function is unremarkable.  These are the filters in your body for medicine, food and liquids that you ingest.    Your Cholesterol is NORMAL. Continue to focus on low fat, high fiber foods and aerobic exericse (huffing & puffing) for at least 20 minutes most days of the week.    Your Thyroid numbers are normal.

## 2023-07-04 DIAGNOSIS — E78.5 HYPERLIPIDEMIA, UNSPECIFIED HYPERLIPIDEMIA TYPE: ICD-10-CM

## 2023-07-04 RX ORDER — PRAVASTATIN SODIUM 20 MG/1
TABLET ORAL
Qty: 90 TABLET | Refills: 3 | Status: SHIPPED | OUTPATIENT
Start: 2023-07-04

## 2023-07-04 NOTE — TELEPHONE ENCOUNTER
Refill Decision Note   Padmini Phill  is requesting a refill authorization.  Brief Assessment and Rationale for Refill:  Approve     Medication Therapy Plan:         Comments:     Note composed:10:42 AM 07/04/2023

## 2023-07-04 NOTE — TELEPHONE ENCOUNTER
No care due was identified.  [facility] Embedded Care Due Messages. Reference number: [id number].   7/04/2023 9:16:50 AM CDT

## 2023-08-03 ENCOUNTER — PATIENT MESSAGE (OUTPATIENT)
Dept: PRIMARY CARE CLINIC | Facility: CLINIC | Age: 58
End: 2023-08-03
Payer: COMMERCIAL

## 2023-08-03 ENCOUNTER — TELEPHONE (OUTPATIENT)
Dept: PRIMARY CARE CLINIC | Facility: CLINIC | Age: 58
End: 2023-08-03
Payer: COMMERCIAL

## 2023-08-06 NOTE — PROGRESS NOTES
[facility] Note    Chief Complaint      Chief Complaint   Patient presents with    Neck Pain       History of Present Illness      Padmini Pollock is a 58 y.o. female   with Heptic cyst, Simple Renal cyst, GERD, HLD, Postmenopausal on HRT, HTN, Colon Polyps presents to fu  well visit. Referred by Mom, Christianne . Last visit - 4/20/23    Chronic Neck pain - having neck issues since slipping back in February on vacation and felt she pulled something in her neck. It comes and goes. I would like to get it checked. No numbness/tingling or weakness. Can't lie on her RT side at night. Hurts if she turns her neck to the Rt. 1/10 in severity. It does not stop her from doing anything. She has not taken any medicaiton for it.      HTN - Controlled  on Atenolol  25 mg QHS and Losartan 25 mg 1 tab po QAm. Rec low sodium diet.      Recurrent Urinary Sx's -  She is s/p partial Hys. She is on HRT.  She never fu with . She is on Macrobid prn per OB.  Doing better.       Fatty Liver/Hepatic Cysts - Abd U/S -5/13/22 - Numerous simple and mildly complex hepatic cysts. +Fatty liver. Rec limit tylenol and alcohol.  Rec diet and exercise for wt loss.  As discussed at visit there is a potential for cirrhosis. Her Liver functions are back to normal. Fu by Dr. Avelar.  Elastography - 10/13/22 Fibrosis stage 0-1 Steatosis Grade < 1.       Hepatic cyst -  Fu by Hepatology, Dr. Avelar.  MRI abd - 11/2/22 - Multiple renal and hepatic cysts.   Will repeat U/s in 1 yr.      Simple Renal cyst - Abd U/S - 5/13/22 -  Multiple cysts.  Lgst at the lower pole  a 4.3 cm cyst.     GERD - Rec reflux prec. Takes tums prn rarely.      HLD - Pt on Pravastatin 20 mg QHS - controlled in Apr.  She prev had labs with Cards, Dr. Dobson.  Prefer not to go back to him.      Postmenopausal on HRT - Pt on estrace per OB.       Colon Polyps - Fu by  Dr. Mayorga.       Cough variant asthma - saw Dr. Kovacs in past. She has not needed inhaler often.   She  "reports PFT's were "borderline".     Overweight - BMI - 27.3 - unchanged. Rec low carb diet and exercise.       Hemorrhoids - Rec adeq hydration.  Rec inc fiber in diet.  Rec stool softener Qday-BID and Miralax prn. conservative management options for symptoms related to hemorrhoids. Irritation or itching can be treated with Lidocaine cream/ointment, over-the-counter Hydrocortisone suppositories, warm baths or soaking. No bleeding.      HCM - Flu - 9/2020 - due;  Tdap -1/26/19;  PCV 13 - none;  PVX 23 - none- pt defers;   Shingrix - none;  COVID - 19 Vaccine (Pfizer)  #1 2/24/21; #2 3/17/21; and # 3 - due; MGM -  4/1/23 - at [facility]- repeat 1 yr;  DEXA - 7/1/22 - neg.;  PAP - 3/22/22- neg; Hep C Screen - 11/3/21 - neg.;  HIV Screen - 11/3/21 - neg.; C-scope - 8/16/22 - hemorrhoids, angioectasia, polyp- repeat 5 yrs;    Prev PCP - none;  Ob/GYN - Dr. Jimenez; Prev Cards - Dr. Dobson; GI - Dr. Mayorga; Pulm - Dr. Kovacs; Well visit - 7/1/22    Patient Care Team:  Elvia Chacko MD as PCP - General (Internal Medicine)  Kenyatta Ramirez MA as Care Coordinator  Jose Roberto Mayorga MD as Consulting Physician (Gastroenterology)     Health Maintenance:  Immunization History   Administered Date(s) Administered    COVID-19, MRNA, LN-S, PF (Pfizer) (Purple Cap) 02/24/2021, 03/17/2021    Influenza - Quadrivalent - MDCK - PF 10/06/2018, 11/14/2019    Influenza - Quadrivalent - PF *Preferred* (6 months and older) 09/25/2020    Influenza - Trivalent (ADULT) 09/11/2015    Tdap 01/26/2019      Health Maintenance   Topic Date Due    Mammogram  04/01/2024    DEXA Scan  07/01/2024    Lipid Panel  04/26/2028    TETANUS VACCINE  01/26/2029    Hepatitis C Screening  Completed        Past Medical History:  Past Medical History:   Diagnosis Date    COVID-19     12/20 and 12/21    Depression     Endometriosis     Family history of breast cancer in mother     Hx of migraine headaches     Hyperlipidemia     Hyperlipidemia 06/30/2021    " Hypertension 2021    Menopause     Prolapsing mitral leaflet syndrome        Past Surgical History:   has a past surgical history that includes  section; Hysterectomy (2011); and Colonoscopy (2016).    Family History:  family history includes Breast cancer in her maternal aunt and mother; Heart attack in her father; Heart disease (age of onset: 65) in her father; Hypertension in her father and mother; No Known Problems in her sister.     Social History:  Social History     Tobacco Use    Smoking status: Never     Passive exposure: Never    Smokeless tobacco: Never   Substance Use Topics    Alcohol use: Yes     Comment: rare - once every 6 mos    Drug use: Never       Review of Systems   Constitutional:  Negative for chills and fever.   HENT:  Positive for nasal congestion. Negative for sore throat.    Respiratory:  Negative for cough, chest tightness and shortness of breath.    Cardiovascular:  Negative for chest pain and palpitations.   Gastrointestinal:  Negative for abdominal pain, constipation, diarrhea, nausea and vomiting.   Musculoskeletal:  Positive for arthralgias and neck pain. Negative for back pain.   Neurological:  Negative for weakness and numbness.        Medications:    Current Outpatient Medications:     albuterol (PROAIR HFA) 90 mcg/actuation inhaler, Inhale 2 puffs into the lungs every 6 (six) hours as needed for Wheezing. Rescue, Disp: 18 g, Rfl: 0    estradioL (ESTRACE) 1 MG tablet, Take 1 tablet (1 mg total) by mouth once daily., Disp: 90 tablet, Rfl: 3    nitrofurantoin (MACRODANTIN) 50 MG capsule, TAKE 1 CAPSULE BY MOUTH NIGHTLY AS NEEDED FOR (INTERCOURSE). TAKE EVENING OF AND NEXT MORNING, Disp: , Rfl:     pravastatin (PRAVACHOL) 20 MG tablet, Take 1 tablet by mouth once daily, Disp: 90 tablet, Rfl: 3    vitamin D (VITAMIN D3) 1000 units Tab, Take 1 tablet (1,000 Units total) by mouth once daily., Disp: 1 tablet, Rfl: 0    atenoloL (TENORMIN) 25 MG tablet, Take 1 tablet (25 mg  "total) by mouth once daily., Disp: 90 tablet, Rfl: 1    cyclobenzaprine (FLEXERIL) 5 MG tablet, 1 po QHS prn neck pain, Disp: 30 tablet, Rfl: 0    losartan (COZAAR) 25 MG tablet, Take 1 tablet (25 mg total) by mouth once daily., Disp: 90 tablet, Rfl: 1    meloxicam (MOBIC) 7.5 MG tablet, 1 po daily prn neck pain to be taken with food, Disp: 30 tablet, Rfl: 0     Allergies:  Review of patient's allergies indicates:  No Known Allergies    Physical Exam      Vital Signs  Temp: 98 °F (36.7 °C)  Temp Source: Oral  Pulse: 67  Resp: 16  SpO2: 96 %  BP: (!) 140/98  BP Location: Right arm  Patient Position: Sitting  Pain Score:   2  Height and Weight  Height: 5' 2" (157.5 cm)  Weight: 67.7 kg (149 lb 4 oz)  BSA (Calculated - sq m): 1.72 sq meters  BMI (Calculated): 27.3  Weight in (lb) to have BMI = 25: 136.4      Patient Position: Sitting        Physical Exam  Vitals reviewed.   Constitutional:       General: She is not in acute distress.     Appearance: Normal appearance. She is not ill-appearing, toxic-appearing or diaphoretic.   HENT:      Head: Atraumatic.   Neck:      Vascular: No carotid bruit.   Cardiovascular:      Rate and Rhythm: Normal rate and regular rhythm.      Pulses: Normal pulses.      Heart sounds: Normal heart sounds. No murmur heard.  Pulmonary:      Effort: No respiratory distress.      Breath sounds: Normal breath sounds. No wheezing.   Abdominal:      General: Bowel sounds are normal. There is no distension.      Palpations: Abdomen is soft.   Musculoskeletal:      Cervical back: Neck supple. No tenderness.      Comments: + Pain on Rotation of neck to rt and flexion of neck to Rt.  Slight dec rOM of neck on Rt rotation of c-spine.  No pain on froward flexion or extension of neck. FROM of shoulders - neg push off test; Neg apley scratch test. No pain on int or ext rotation of shoulders.  No pain on abduction or adduction.    Lymphadenopathy:      Cervical: No cervical adenopathy.   Neurological:      " General: No focal deficit present.      Mental Status: She is alert and oriented to person, place, and time.   Psychiatric:         Mood and Affect: Mood normal.         Behavior: Behavior normal.          Laboratory:  CBC:  Recent Labs   Lab 11/03/21 0000 04/26/23 0824   WBC 7.1 5.98   RBC 4.27 4.30   Hemoglobin 13.8 13.5   Hematocrit 40.6 41.3   Platelets 337 296   MCV 95.1 96   MCH 32.2 31.4 H   MCHC 33.9 32.7       CMP:  Recent Labs   Lab 11/03/21  0000 11/03/21  0000 07/01/22  0820 04/26/23 0824   Glucose 92  --   --  81   Calcium 9.4  --   --  8.8   Albumin  --    < > 3.7 3.5   ALBUMIN 4.0  --   --   --    Total Protein 7.0   < > 7.2 6.8   Sodium 142  --   --  141   Potassium 4.3  --   --  4.0   CO2 26  --   --  28   Chloride 103  --   --  107   BUN 10  --   --  16   Creatinine 0.8  --   --  0.9   Alkaline Phosphatase 121  --  91 82   ALT 58 H  --  18 16   AST 56 H  --  19 20   Total Bilirubin 0.3  --  0.4 0.4    < > = values in this interval not displayed.           URINALYSIS:  Recent Labs   Lab 08/02/22  1329   Color, UA Yellow   Specific Gravity, UA 1.005   pH, UA 6.0   Protein, UA 1+ A   Bacteria Occasional   Nitrite, UA Negative   Leukocytes, UA 3+ A   Hyaline Casts, UA 0        LIPIDS:  Recent Labs   Lab 11/03/21 0000 04/26/23 0824   TSH 1.90 1.255   HDL 50 51   Cholesterol 203 H 180   Triglycerides 142 130   LDL Calculated 130 H  --    LDL Cholesterol  --  103.0   HDL/Cholesterol Ratio  --  28.3   Non-HDL Cholesterol 153 H 129   Total Cholesterol/HDL Ratio  --  3.5       TSH:  Recent Labs   Lab 11/03/21 0000 04/26/23 0824   TSH 1.90 1.255            Recent Labs   Lab 11/03/21 0000   Hepatitis C Ab NON-REACTIVE       Assessment/Plan     Padmini Pollock is a 58 y.o.female with:    Cervicalgia  -     X-Ray Cervical Spine AP And Lateral; Future; Expected date: 08/09/2023  - meloxicam (MOBIC) 7.5 MG tablet; 1 po daily prn neck pain to be taken with food  Dispense: 30 tablet; Refill: 0  -      cyclobenzaprine (FLEXERIL) 5 MG tablet; 1 po QHS prn neck pain  Dispense: 30 tablet; Refill: 0  - Will check Xray C-spine.  Will Rx Meloxicam to be taken  Qday prn neck pain to be taken with food and not with other NSAIDS. Flexeril 5 mg QHS prn. Rec heating pad x 20 min intervals. Consider Ptx referral if not improved or worse.     Hypertension, unspecified type  -     losartan (COZAAR) 25 MG tablet; Take 1 tablet (25 mg total) by mouth once daily.  Dispense: 90 tablet; Refill: 1  -     atenoloL (TENORMIN) 25 MG tablet; Take 1 tablet (25 mg total) by mouth once daily.  Dispense: 90 tablet; Refill: 1  -Elevated today. Cont current. Repeat BP      Chronic conditions status updated as per HPI.  Other than changes above, cont current medications and maintain follow up with specialists.    Follow up in about 2 months (around 10/17/2023) for well visit or sooner if needed.      Elvia Chacko MD  [facility]

## 2023-08-09 ENCOUNTER — OFFICE VISIT (OUTPATIENT)
Dept: PRIMARY CARE CLINIC | Facility: CLINIC | Age: 58
End: 2023-08-09
Payer: COMMERCIAL

## 2023-08-09 ENCOUNTER — HOSPITAL ENCOUNTER (OUTPATIENT)
Dept: RADIOLOGY | Facility: HOSPITAL | Age: 58
Discharge: HOME | End: 2023-08-09
Attending: INTERNAL MEDICINE
Payer: COMMERCIAL

## 2023-08-09 ENCOUNTER — TELEPHONE (OUTPATIENT)
Dept: PRIMARY CARE CLINIC | Facility: CLINIC | Age: 58
End: 2023-08-09

## 2023-08-09 VITALS
RESPIRATION RATE: 16 BRPM | TEMPERATURE: 98 F | WEIGHT: 149.25 LBS | DIASTOLIC BLOOD PRESSURE: 98 MMHG | HEART RATE: 67 BPM | OXYGEN SATURATION: 96 % | BODY MASS INDEX: 27.47 KG/M2 | SYSTOLIC BLOOD PRESSURE: 140 MMHG | HEIGHT: 62 IN

## 2023-08-09 DIAGNOSIS — M54.2 CERVICALGIA: ICD-10-CM

## 2023-08-09 DIAGNOSIS — M54.2 CERVICALGIA: Primary | ICD-10-CM

## 2023-08-09 DIAGNOSIS — I10 HYPERTENSION, UNSPECIFIED TYPE: ICD-10-CM

## 2023-08-09 PROCEDURE — 3077F SYST BP >= 140 MM HG: CPT | Mod: CPTII,S$GLB,, | Performed by: INTERNAL MEDICINE

## 2023-08-09 PROCEDURE — 3077F PR MOST RECENT SYSTOLIC BLOOD PRESSURE >= 140 MM HG: ICD-10-PCS | Mod: CPTII,S$GLB,, | Performed by: INTERNAL MEDICINE

## 2023-08-09 PROCEDURE — 1160F PR REVIEW ALL MEDS BY PRESCRIBER/CLIN PHARMACIST DOCUMENTED: ICD-10-PCS | Mod: CPTII,S$GLB,, | Performed by: INTERNAL MEDICINE

## 2023-08-09 PROCEDURE — 72040 X-RAY EXAM NECK SPINE 2-3 VW: CPT | Mod: TC,PN

## 2023-08-09 PROCEDURE — 4010F PR ACE/ARB THEARPY RXD/TAKEN: ICD-10-PCS | Mod: CPTII,S$GLB,, | Performed by: INTERNAL MEDICINE

## 2023-08-09 PROCEDURE — 72040 XR CERVICAL SPINE AP LATERAL: ICD-10-PCS | Mod: 26,,, | Performed by: RADIOLOGY

## 2023-08-09 PROCEDURE — 99214 OFFICE O/P EST MOD 30 MIN: CPT | Mod: S$GLB,,, | Performed by: INTERNAL MEDICINE

## 2023-08-09 PROCEDURE — 72040 X-RAY EXAM NECK SPINE 2-3 VW: CPT | Mod: 26,,, | Performed by: RADIOLOGY

## 2023-08-09 PROCEDURE — 99214 PR OFFICE/OUTPT VISIT, EST, LEVL IV, 30-39 MIN: ICD-10-PCS | Mod: S$GLB,,, | Performed by: INTERNAL MEDICINE

## 2023-08-09 PROCEDURE — 3008F BODY MASS INDEX DOCD: CPT | Mod: CPTII,S$GLB,, | Performed by: INTERNAL MEDICINE

## 2023-08-09 PROCEDURE — 3008F PR BODY MASS INDEX (BMI) DOCUMENTED: ICD-10-PCS | Mod: CPTII,S$GLB,, | Performed by: INTERNAL MEDICINE

## 2023-08-09 PROCEDURE — 3080F DIAST BP >= 90 MM HG: CPT | Mod: CPTII,S$GLB,, | Performed by: INTERNAL MEDICINE

## 2023-08-09 PROCEDURE — 1159F MED LIST DOCD IN RCRD: CPT | Mod: CPTII,S$GLB,, | Performed by: INTERNAL MEDICINE

## 2023-08-09 PROCEDURE — 3080F PR MOST RECENT DIASTOLIC BLOOD PRESSURE >= 90 MM HG: ICD-10-PCS | Mod: CPTII,S$GLB,, | Performed by: INTERNAL MEDICINE

## 2023-08-09 PROCEDURE — 99999 PR PBB SHADOW E&M-EST. PATIENT-LVL V: ICD-10-PCS | Mod: PBBFAC,,, | Performed by: INTERNAL MEDICINE

## 2023-08-09 PROCEDURE — 1159F PR MEDICATION LIST DOCUMENTED IN MEDICAL RECORD: ICD-10-PCS | Mod: CPTII,S$GLB,, | Performed by: INTERNAL MEDICINE

## 2023-08-09 PROCEDURE — 99999 PR PBB SHADOW E&M-EST. PATIENT-LVL V: CPT | Mod: PBBFAC,,, | Performed by: INTERNAL MEDICINE

## 2023-08-09 PROCEDURE — 4010F ACE/ARB THERAPY RXD/TAKEN: CPT | Mod: CPTII,S$GLB,, | Performed by: INTERNAL MEDICINE

## 2023-08-09 PROCEDURE — 1160F RVW MEDS BY RX/DR IN RCRD: CPT | Mod: CPTII,S$GLB,, | Performed by: INTERNAL MEDICINE

## 2023-08-09 RX ORDER — CYCLOBENZAPRINE HCL 5 MG
TABLET ORAL
Qty: 30 TABLET | Refills: 0 | Status: SHIPPED | OUTPATIENT
Start: 2023-08-09

## 2023-08-09 RX ORDER — ATENOLOL 25 MG/1
25 TABLET ORAL DAILY
Qty: 90 TABLET | Refills: 1 | Status: SHIPPED | OUTPATIENT
Start: 2023-08-09 | End: 2023-10-17

## 2023-08-09 RX ORDER — LOSARTAN POTASSIUM 25 MG/1
25 TABLET ORAL DAILY
Qty: 90 TABLET | Refills: 1 | Status: SHIPPED | OUTPATIENT
Start: 2023-08-09 | End: 2024-01-23 | Stop reason: SDUPTHER

## 2023-08-09 RX ORDER — MELOXICAM 7.5 MG/1
TABLET ORAL
Qty: 30 TABLET | Refills: 0 | Status: SHIPPED | OUTPATIENT
Start: 2023-08-09 | End: 2023-12-29

## 2023-08-09 NOTE — TELEPHONE ENCOUNTER
----- Message from Elvia Chacko MD sent at 8/9/2023 12:55 PM CDT -----  What was her repeat BP? Please add to her note.  Dr. CHAMPION

## 2023-08-09 NOTE — TELEPHONE ENCOUNTER
It does not appear a repeat bp was taken at discharge. I left a message regarding patient contact me back to schedule a nurse visit

## 2023-08-10 NOTE — PROGRESS NOTES
I sent pt a my chart message -  I reviewed your Xray of your cervical spine which showed Mild Degenerative Joint disease (Osteoarthritis).  The disc spaces are narrowed between C4 and C6 vertebral segments.  No fracture or dislocation.   Dr. CHAMPION

## 2023-10-16 NOTE — PROGRESS NOTES
[facility] Note    Chief Complaint      Chief Complaint   Patient presents with    Annual Exam       History of Present Illness      Padmini Pollock is a 58 y.o.  female   with Heptic cyst, Simple Renal cyst, GERD, HLD, Postmenopausal on HRT, HTN, Colon Polyps presents to fu  well visit. Referred by Mom, Christianne. Last visit - 8/9/23     Chronic Neck pain - having neck issues since slipping back in February on vacation and felt she pulled something in her neck. It comes and goes. I would like to get it checked. No numbness/tingling or weakness. Can't lie on her RT side at night. Hurts if she turns her neck to the Rt. 2/10 in severity. It does not stop her from doing anything. She has not taken any medication for it. Xray C -spine - 8/9/23 - showed Mild Degenerative Joint disease (Osteoarthritis).  The disc spaces are narrowed between C4 and C6 vertebral segments.  No fracture or dislocation. Will refer to back and spine. No numbness/tingling or weakness. She never really took the meloxicam or the flexeril I rx for her in Aug. I rec she try it. When she checks at home the SBP is 135-140.     HTN - Uncontrolled on Atenolol  25 mg QHS and Losartan 25 mg 1 tab po QAm. Rec low sodium diet. If still high will change Atenolol to Amlodipine. Monitor for any edema. Cont to monitor BP at home. Repeat nurse BP check in 2 wks or call with log at that time.      Recurrent Urinary Sx's -  She is s/p partial Hys. She is on HRT.  She never fu with . She is on Macrobid prn per OB.  Doing better.       Fatty Liver/Hepatic Cysts - Abd U/S -5/13/22 - Numerous simple and mildly complex hepatic cysts. +Fatty liver. Rec limit tylenol and alcohol.  Rec diet and exercise for wt loss.  As discussed at visit there is a potential for cirrhosis. Her Liver functions are back to normal. Fu by Dr. Avelar.  Elastography - 10/13/22 Fibrosis stage 0-1 Steatosis Grade < 1.  Due for fu.      Hepatic cyst -  Fu by Hepatology, Dr. Avelar.   "MRI abd - 11/2/22 - Multiple renal and hepatic cysts.   Will repeat U/s in 1 yr.      Simple Renal cyst - Abd U/S - 5/13/22 -  Multiple cysts.  Lgst at the lower pole  a 4.3 cm cyst.     GERD - Rec reflux prec. Takes tums prn rarely.      HLD - Pt on Pravastatin 20 mg QHS - controlled in Apr.  She prev had labs with [facility], Dr. Dobson.  Prefers not to go back to him.      Postmenopausal on HRT - Pt on estrace per OB.       Colon Polyps - Fu by  Dr. Mayorga.       Cough variant asthma - saw Dr. Kovacs in past. She has not needed inhaler often.   She reports PFT's were "borderline".Rarely needs her inhaler.      Overweight - BMI - 27.98- up 4 lb.  Rec low carb diet and exercise.       Hemorrhoids - Rec adeq hydration.  Rec inc fiber in diet.  Rec stool softener Qday-BID and Miralax prn. conservative management options for symptoms related to hemorrhoids. Irritation or itching can be treated with Lidocaine cream/ointment, over-the-counter Hydrocortisone suppositories, warm baths or soaking. No bleeding.      HCM - Flu - 9/2020 - due- pt declines;  Tdap -1/26/19;  PCV 13 - none;  PVX 23 - none- pt defers;   Shingrix - none- defers;  COVID - 19 Vaccine (Pfizer)  #1 2/24/21; #2 3/17/21; and # 3 - due; MGM -  4/1/23 - at [facility]- repeat 1 yr;  DEXA - 7/1/22 - neg.;  PAP - 3/22/22- neg; Hep C Screen - 11/3/21 - neg.;  HIV Screen - 11/3/21 - neg.; C-scope - 8/16/22 - hemorrhoids, angioectasia, polyp- repeat 5 yrs;    Prev PCP - none;  Ob/GYN - Dr. Jimenez; Prev Cards - Dr. Dobson; GI - Dr. Mayorga; Pulm - Dr. Kovacs; Well visit - 10/17/23     Patient Care Team:  Elvia Chacko MD as PCP - General (Internal Medicine)  Kenyatta Ramirez MA as Care Coordinator  Jose Roberto Mayorga MD as Consulting Physician (Gastroenterology)     Health Maintenance:  Immunization History   Administered Date(s) Administered    COVID-19, MRNA, LN-S, PF (Pfizer) (Purple Cap) 02/24/2021, 03/17/2021    Influenza - Quadrivalent - [facility] - PF " 10/06/2018, 2019    Influenza - Quadrivalent - PF *Preferred* (6 months and older) 2020    Influenza - Trivalent (ADULT) 2015    Tdap 2019      Health Maintenance   Topic Date Due    Shingles Vaccine (1 of 2) Never done    Mammogram  2024    DEXA Scan  2024    Colorectal Cancer Screening  2027    Lipid Panel  2028    TETANUS VACCINE  2029    Hepatitis C Screening  Completed        Past Medical History:  Past Medical History:   Diagnosis Date    COVID-19      and     Depression     Endometriosis     Family history of breast cancer in mother     Hx of migraine headaches     Hyperlipidemia     Hyperlipidemia 2021    Hypertension 2021    Menopause     Prolapsing mitral leaflet syndrome        Past Surgical History:   has a past surgical history that includes  section; Hysterectomy (); and Colonoscopy ().    Family History:  family history includes Breast cancer in her maternal aunt and mother; Heart attack in her father; Heart disease (age of onset: 65) in her father; Hypertension in her father and mother; No Known Problems in her sister.     Social History:  Social History     Tobacco Use    Smoking status: Never     Passive exposure: Never    Smokeless tobacco: Never   Substance Use Topics    Alcohol use: Yes     Comment: rare - once every 6 mos    Drug use: Never       Review of Systems   Constitutional:  Negative for chills, diaphoresis and fever.   HENT:  Negative for nasal congestion and sore throat.    Respiratory:  Negative for cough.    Cardiovascular:  Negative for chest pain and palpitations.   Gastrointestinal:  Negative for abdominal pain, constipation, diarrhea, nausea and vomiting.   Endocrine: Negative for cold intolerance, heat intolerance and polydipsia.   Genitourinary:  Negative for bladder incontinence, dysuria, frequency and hematuria.   Musculoskeletal:  Positive for neck pain. Negative for arthralgias and  "myalgias.   Neurological:  Positive for headaches. Negative for dizziness.        Rare   Psychiatric/Behavioral:  Negative for dysphoric mood. The patient is nervous/anxious.         Medications:    Current Outpatient Medications:     albuterol (PROAIR HFA) 90 mcg/actuation inhaler, Inhale 2 puffs into the lungs every 6 (six) hours as needed for Wheezing. Rescue, Disp: 18 g, Rfl: 0    estradioL (ESTRACE) 1 MG tablet, Take 1 tablet (1 mg total) by mouth once daily., Disp: 90 tablet, Rfl: 3    losartan (COZAAR) 25 MG tablet, Take 1 tablet (25 mg total) by mouth once daily., Disp: 90 tablet, Rfl: 1    nitrofurantoin (MACRODANTIN) 50 MG capsule, TAKE 1 CAPSULE BY MOUTH NIGHTLY AS NEEDED FOR (INTERCOURSE). TAKE EVENING OF AND NEXT MORNING, Disp: , Rfl:     pravastatin (PRAVACHOL) 20 MG tablet, Take 1 tablet by mouth once daily, Disp: 90 tablet, Rfl: 3    vitamin D (VITAMIN D3) 1000 units Tab, Take 1 tablet (1,000 Units total) by mouth once daily., Disp: 1 tablet, Rfl: 0    amLODIPine (NORVASC) 5 MG tablet, Take 1 tablet (5 mg total) by mouth once daily., Disp: 30 tablet, Rfl: 0    cyclobenzaprine (FLEXERIL) 5 MG tablet, 1 po QHS prn neck pain (Patient not taking: Reported on 10/17/2023), Disp: 30 tablet, Rfl: 0    meloxicam (MOBIC) 7.5 MG tablet, 1 po daily prn neck pain to be taken with food (Patient not taking: Reported on 10/17/2023), Disp: 30 tablet, Rfl: 0     Allergies:  Review of patient's allergies indicates:  No Known Allergies    Physical Exam      Vital Signs  Temp: 98 °F (36.7 °C)  Temp Source: Oral  Pulse: 92  Resp: 18  SpO2: 97 %  BP: (!) 143/84  BP Location: Right arm  Pain Score: 0-No pain  Height and Weight  Height: 5' 2" (157.5 cm)  Weight: 69.4 kg (153 lb)  BSA (Calculated - sq m): 1.74 sq meters  BMI (Calculated): 28  Weight in (lb) to have BMI = 25: 136.4             Physical Exam  Vitals reviewed.   Constitutional:       General: She is not in acute distress.     Appearance: Normal appearance. She " is not ill-appearing, toxic-appearing or diaphoretic.   HENT:      Head: Normocephalic and atraumatic.      Right Ear: Tympanic membrane normal.      Left Ear: Tympanic membrane normal.   Eyes:      Extraocular Movements: Extraocular movements intact.      Conjunctiva/sclera: Conjunctivae normal.      Pupils: Pupils are equal, round, and reactive to light.   Neck:      Vascular: No carotid bruit.   Cardiovascular:      Rate and Rhythm: Normal rate and regular rhythm.      Pulses: Normal pulses.      Heart sounds: Normal heart sounds.   Pulmonary:      Effort: Pulmonary effort is normal. No respiratory distress.      Breath sounds: Normal breath sounds.   Abdominal:      General: Bowel sounds are normal. There is no distension.      Palpations: Abdomen is soft.      Tenderness: There is no abdominal tenderness. There is no guarding or rebound.   Musculoskeletal:      Cervical back: Neck supple. No tenderness.      Comments: +Pain on rotation of neck to RT   Neurological:      General: No focal deficit present.      Mental Status: She is alert and oriented to person, place, and time.   Psychiatric:         Mood and Affect: Mood normal.         Behavior: Behavior normal.          Laboratory:  CBC:  Recent Labs   Lab 11/03/21  0000 04/26/23  0824   WBC 7.1 5.98   RBC 4.27 4.30   Hemoglobin 13.8 13.5   Hematocrit 40.6 41.3   Platelets 337 296   MCV 95.1 96   MCH 32.2 31.4 H   MCHC 33.9 32.7       CMP:  Recent Labs   Lab 11/03/21  0000 11/03/21  0000 07/01/22  0820 04/26/23  0824   Glucose 92  --   --  81   Calcium 9.4  --   --  8.8   Albumin  --    < > 3.7 3.5   ALBUMIN 4.0  --   --   --    Total Protein 7.0   < > 7.2 6.8   Sodium 142  --   --  141   Potassium 4.3  --   --  4.0   CO2 26  --   --  28   Chloride 103  --   --  107   BUN 10  --   --  16   Creatinine 0.8  --   --  0.9   Alkaline Phosphatase 121  --  91 82   ALT 58 H  --  18 16   AST 56 H  --  19 20   Total Bilirubin 0.3  --  0.4 0.4    < > = values in this  interval not displayed.       URINALYSIS:  Recent Labs   Lab 08/02/22  1329   Color, UA Yellow   Specific Gravity, UA 1.005   pH, UA 6.0   Protein, UA 1+ A   Bacteria Occasional   Nitrite, UA Negative   Leukocytes, UA 3+ A   Hyaline Casts, UA 0        LIPIDS:  Recent Labs   Lab 11/03/21  0000 04/26/23  0824   TSH 1.90 1.255   HDL 50 51   Cholesterol 203 H 180   Triglycerides 142 130   LDL Calculated 130 H  --    LDL Cholesterol  --  103.0   HDL/Cholesterol Ratio  --  28.3   Non-HDL Cholesterol 153 H 129   Total Cholesterol/HDL Ratio  --  3.5       TSH:  Recent Labs   Lab 11/03/21  0000 04/26/23  0824   TSH 1.90 1.255            Recent Labs   Lab 11/03/21  0000   Hepatitis C Ab NON-REACTIVE       Assessment/Plan     Padmini Pollock is a 58 y.o.female with:    Normal physical exam, routine  - Performed today.       Hypertension, unspecified type  -     Comprehensive Metabolic Panel; Future; Expected date: 10/17/2023  -     amLODIPine (NORVASC) 5 MG tablet; Take 1 tablet (5 mg total) by mouth once daily.  Dispense: 30 tablet; Refill: 0  - Uncontrolled.  Will  cont. Losartan 25 mg 1 tab po QAm. Rec low sodium diet. Will change Atenolol to Amlodipine. Monitor for any edema. Cont to monitor BP at home. Repeat nurse BP check in 2 wks or call with log at that time.      Cervicalgia  -     Ambulatory referral/consult to Back & Spine Clinic; Future; Expected date: 10/24/2023  - Rec Meloxicam and flexeril prn as prev Rx.  Rec back and spine.     Fatty liver  - Rec limit tylenol and alcohol.  Rec diet and exercise for wt loss.       Cough variant asthma  - Stable.  Cont current regimen.         Chronic conditions status updated as per HPI.  Other than changes above, cont current medications and maintain follow up with specialists.   Follow up in about 6 months (around 4/17/2024) for fu chronic issues or sooner if needed.      Elvia Chacko MD  [facility]

## 2023-10-17 ENCOUNTER — OFFICE VISIT (OUTPATIENT)
Dept: PRIMARY CARE CLINIC | Facility: CLINIC | Age: 58
End: 2023-10-17
Payer: COMMERCIAL

## 2023-10-17 ENCOUNTER — LAB VISIT (OUTPATIENT)
Dept: LAB | Facility: HOSPITAL | Age: 58
End: 2023-10-17
Attending: INTERNAL MEDICINE
Payer: COMMERCIAL

## 2023-10-17 VITALS
DIASTOLIC BLOOD PRESSURE: 84 MMHG | BODY MASS INDEX: 28.16 KG/M2 | RESPIRATION RATE: 18 BRPM | HEIGHT: 62 IN | TEMPERATURE: 98 F | HEART RATE: 92 BPM | WEIGHT: 153 LBS | OXYGEN SATURATION: 97 % | SYSTOLIC BLOOD PRESSURE: 143 MMHG

## 2023-10-17 DIAGNOSIS — I10 HYPERTENSION, UNSPECIFIED TYPE: ICD-10-CM

## 2023-10-17 DIAGNOSIS — Z00.00 NORMAL PHYSICAL EXAM, ROUTINE: Primary | ICD-10-CM

## 2023-10-17 DIAGNOSIS — K76.0 FATTY LIVER: ICD-10-CM

## 2023-10-17 DIAGNOSIS — M54.2 CERVICALGIA: ICD-10-CM

## 2023-10-17 DIAGNOSIS — J45.991 COUGH VARIANT ASTHMA: ICD-10-CM

## 2023-10-17 LAB
ALBUMIN SERPL BCP-MCNC: 3.5 G/DL (ref 3.5–5.2)
ALP SERPL-CCNC: 85 U/L (ref 55–135)
ALT SERPL W/O P-5'-P-CCNC: 16 U/L (ref 10–44)
ANION GAP SERPL CALC-SCNC: 9 MMOL/L (ref 8–16)
AST SERPL-CCNC: 18 U/L (ref 10–40)
BILIRUB SERPL-MCNC: 0.2 MG/DL (ref 0.1–1)
BUN SERPL-MCNC: 14 MG/DL (ref 6–20)
CALCIUM SERPL-MCNC: 8.8 MG/DL (ref 8.7–10.5)
CHLORIDE SERPL-SCNC: 106 MMOL/L (ref 95–110)
CO2 SERPL-SCNC: 26 MMOL/L (ref 23–29)
CREAT SERPL-MCNC: 0.8 MG/DL (ref 0.5–1.4)
EST. GFR  (NO RACE VARIABLE): >60 ML/MIN/1.73 M^2
GLUCOSE SERPL-MCNC: 102 MG/DL (ref 70–110)
POTASSIUM SERPL-SCNC: 4 MMOL/L (ref 3.5–5.1)
PROT SERPL-MCNC: 6.9 G/DL (ref 6–8.4)
SODIUM SERPL-SCNC: 141 MMOL/L (ref 136–145)

## 2023-10-17 PROCEDURE — 99999 PR PBB SHADOW E&M-EST. PATIENT-LVL V: CPT | Mod: PBBFAC,,, | Performed by: INTERNAL MEDICINE

## 2023-10-17 PROCEDURE — 3008F BODY MASS INDEX DOCD: CPT | Mod: CPTII,S$GLB,, | Performed by: INTERNAL MEDICINE

## 2023-10-17 PROCEDURE — 3077F PR MOST RECENT SYSTOLIC BLOOD PRESSURE >= 140 MM HG: ICD-10-PCS | Mod: CPTII,S$GLB,, | Performed by: INTERNAL MEDICINE

## 2023-10-17 PROCEDURE — 99999 PR PBB SHADOW E&M-EST. PATIENT-LVL V: ICD-10-PCS | Mod: PBBFAC,,, | Performed by: INTERNAL MEDICINE

## 2023-10-17 PROCEDURE — 80053 COMPREHEN METABOLIC PANEL: CPT | Performed by: INTERNAL MEDICINE

## 2023-10-17 PROCEDURE — 99396 PR PREVENTIVE VISIT,EST,40-64: ICD-10-PCS | Mod: S$GLB,,, | Performed by: INTERNAL MEDICINE

## 2023-10-17 PROCEDURE — 36415 COLL VENOUS BLD VENIPUNCTURE: CPT | Mod: PN | Performed by: INTERNAL MEDICINE

## 2023-10-17 PROCEDURE — 1159F MED LIST DOCD IN RCRD: CPT | Mod: CPTII,S$GLB,, | Performed by: INTERNAL MEDICINE

## 2023-10-17 PROCEDURE — 3079F DIAST BP 80-89 MM HG: CPT | Mod: CPTII,S$GLB,, | Performed by: INTERNAL MEDICINE

## 2023-10-17 PROCEDURE — 4010F PR ACE/ARB THEARPY RXD/TAKEN: ICD-10-PCS | Mod: CPTII,S$GLB,, | Performed by: INTERNAL MEDICINE

## 2023-10-17 PROCEDURE — 99396 PREV VISIT EST AGE 40-64: CPT | Mod: S$GLB,,, | Performed by: INTERNAL MEDICINE

## 2023-10-17 PROCEDURE — 1160F RVW MEDS BY RX/DR IN RCRD: CPT | Mod: CPTII,S$GLB,, | Performed by: INTERNAL MEDICINE

## 2023-10-17 PROCEDURE — 3077F SYST BP >= 140 MM HG: CPT | Mod: CPTII,S$GLB,, | Performed by: INTERNAL MEDICINE

## 2023-10-17 PROCEDURE — 1160F PR REVIEW ALL MEDS BY PRESCRIBER/CLIN PHARMACIST DOCUMENTED: ICD-10-PCS | Mod: CPTII,S$GLB,, | Performed by: INTERNAL MEDICINE

## 2023-10-17 PROCEDURE — 1159F PR MEDICATION LIST DOCUMENTED IN MEDICAL RECORD: ICD-10-PCS | Mod: CPTII,S$GLB,, | Performed by: INTERNAL MEDICINE

## 2023-10-17 PROCEDURE — 3008F PR BODY MASS INDEX (BMI) DOCUMENTED: ICD-10-PCS | Mod: CPTII,S$GLB,, | Performed by: INTERNAL MEDICINE

## 2023-10-17 PROCEDURE — 3079F PR MOST RECENT DIASTOLIC BLOOD PRESSURE 80-89 MM HG: ICD-10-PCS | Mod: CPTII,S$GLB,, | Performed by: INTERNAL MEDICINE

## 2023-10-17 PROCEDURE — 4010F ACE/ARB THERAPY RXD/TAKEN: CPT | Mod: CPTII,S$GLB,, | Performed by: INTERNAL MEDICINE

## 2023-10-17 RX ORDER — AMLODIPINE BESYLATE 5 MG/1
5 TABLET ORAL DAILY
Qty: 30 TABLET | Refills: 0 | Status: SHIPPED | OUTPATIENT
Start: 2023-10-17 | End: 2023-10-20

## 2023-10-18 ENCOUNTER — TELEPHONE (OUTPATIENT)
Dept: PRIMARY CARE CLINIC | Facility: CLINIC | Age: 58
End: 2023-10-18
Payer: COMMERCIAL

## 2023-10-18 NOTE — TELEPHONE ENCOUNTER
----- Message from Shaunna Martinez sent at 10/18/2023  2:23 PM CDT -----  Contact: self [phone]  Patient is returning a phone call.  Who left a message for the patient: Norma Tyson LPN   Does patient know what this is regarding:    Would you like a call back, or a response through your [facility] portal?:   yes  Comments:     Please call and advise

## 2023-10-18 NOTE — PROGRESS NOTES
I sent pt a my chart message -  I reviewed your labs.   Your kidney function and liver functions looked good.   No further recommendations at this time.    Dr. CHAMPION

## 2023-10-18 NOTE — TELEPHONE ENCOUNTER
----- Message from Elvia Chacko MD sent at 10/18/2023  2:00 PM CDT -----  I sent pt a my chart message -  I reviewed your labs.   Your kidney function and liver functions looked good.   No further recommendations at this time.    Dr. CHAMPION

## 2023-10-18 NOTE — TELEPHONE ENCOUNTER
----- Message from Shaunna Martinez sent at 10/18/2023  2:23 PM CDT -----  Contact: self [phone]  Patient is returning a phone call.  Who left a message for the patient: Norma Tyson LPN   Does patient know what this is regarding:    Would you like a call back, or a response through your [facility] portal?:   yes  Comments:     Please call and advise

## 2023-10-19 ENCOUNTER — PATIENT MESSAGE (OUTPATIENT)
Dept: PRIMARY CARE CLINIC | Facility: CLINIC | Age: 58
End: 2023-10-19
Payer: COMMERCIAL

## 2023-10-19 NOTE — TELEPHONE ENCOUNTER
Pt stated that she does not wish to change BP meds at this time. She has started the low sodium diet along with exercise and will continue to monitor her BP for 2 weeks.

## 2023-10-20 RX ORDER — ATENOLOL 25 MG/1
25 TABLET ORAL DAILY
COMMUNITY
End: 2024-01-23 | Stop reason: SDUPTHER

## 2023-12-28 ENCOUNTER — PATIENT MESSAGE (OUTPATIENT)
Dept: PRIMARY CARE CLINIC | Facility: CLINIC | Age: 58
End: 2023-12-28
Payer: COMMERCIAL

## 2023-12-29 ENCOUNTER — TELEPHONE (OUTPATIENT)
Dept: PRIMARY CARE CLINIC | Facility: CLINIC | Age: 58
End: 2023-12-29
Payer: COMMERCIAL

## 2023-12-29 ENCOUNTER — OFFICE VISIT (OUTPATIENT)
Dept: INTERNAL MEDICINE | Facility: CLINIC | Age: 58
End: 2023-12-29
Payer: COMMERCIAL

## 2023-12-29 VITALS
DIASTOLIC BLOOD PRESSURE: 82 MMHG | OXYGEN SATURATION: 98 % | HEART RATE: 84 BPM | WEIGHT: 154.56 LBS | BODY MASS INDEX: 28.44 KG/M2 | HEIGHT: 62 IN | SYSTOLIC BLOOD PRESSURE: 134 MMHG

## 2023-12-29 DIAGNOSIS — L02.11 ABSCESS OF SKIN OF NECK: Primary | ICD-10-CM

## 2023-12-29 DIAGNOSIS — M54.2 NECK PAIN: ICD-10-CM

## 2023-12-29 PROCEDURE — 3079F DIAST BP 80-89 MM HG: CPT | Mod: CPTII,S$GLB,, | Performed by: INTERNAL MEDICINE

## 2023-12-29 PROCEDURE — 1159F MED LIST DOCD IN RCRD: CPT | Mod: CPTII,S$GLB,, | Performed by: INTERNAL MEDICINE

## 2023-12-29 PROCEDURE — 3079F PR MOST RECENT DIASTOLIC BLOOD PRESSURE 80-89 MM HG: ICD-10-PCS | Mod: CPTII,S$GLB,, | Performed by: INTERNAL MEDICINE

## 2023-12-29 PROCEDURE — 99213 OFFICE O/P EST LOW 20 MIN: CPT | Mod: S$GLB,,, | Performed by: INTERNAL MEDICINE

## 2023-12-29 PROCEDURE — 99213 PR OFFICE/OUTPT VISIT, EST, LEVL III, 20-29 MIN: ICD-10-PCS | Mod: S$GLB,,, | Performed by: INTERNAL MEDICINE

## 2023-12-29 PROCEDURE — 4010F ACE/ARB THERAPY RXD/TAKEN: CPT | Mod: CPTII,S$GLB,, | Performed by: INTERNAL MEDICINE

## 2023-12-29 PROCEDURE — 3008F PR BODY MASS INDEX (BMI) DOCUMENTED: ICD-10-PCS | Mod: CPTII,S$GLB,, | Performed by: INTERNAL MEDICINE

## 2023-12-29 PROCEDURE — 3075F PR MOST RECENT SYSTOLIC BLOOD PRESS GE 130-139MM HG: ICD-10-PCS | Mod: CPTII,S$GLB,, | Performed by: INTERNAL MEDICINE

## 2023-12-29 PROCEDURE — 4010F PR ACE/ARB THEARPY RXD/TAKEN: ICD-10-PCS | Mod: CPTII,S$GLB,, | Performed by: INTERNAL MEDICINE

## 2023-12-29 PROCEDURE — 99999 PR PBB SHADOW E&M-EST. PATIENT-LVL IV: ICD-10-PCS | Mod: PBBFAC,,, | Performed by: INTERNAL MEDICINE

## 2023-12-29 PROCEDURE — 3075F SYST BP GE 130 - 139MM HG: CPT | Mod: CPTII,S$GLB,, | Performed by: INTERNAL MEDICINE

## 2023-12-29 PROCEDURE — 1159F PR MEDICATION LIST DOCUMENTED IN MEDICAL RECORD: ICD-10-PCS | Mod: CPTII,S$GLB,, | Performed by: INTERNAL MEDICINE

## 2023-12-29 PROCEDURE — 3008F BODY MASS INDEX DOCD: CPT | Mod: CPTII,S$GLB,, | Performed by: INTERNAL MEDICINE

## 2023-12-29 PROCEDURE — 99999 PR PBB SHADOW E&M-EST. PATIENT-LVL IV: CPT | Mod: PBBFAC,,, | Performed by: INTERNAL MEDICINE

## 2023-12-29 RX ORDER — IBUPROFEN 600 MG/1
600 TABLET ORAL 3 TIMES DAILY
Qty: 21 TABLET | Refills: 0 | Status: SHIPPED | OUTPATIENT
Start: 2023-12-29

## 2023-12-29 RX ORDER — SULFAMETHOXAZOLE AND TRIMETHOPRIM 800; 160 MG/1; MG/1
1 TABLET ORAL 2 TIMES DAILY
Qty: 14 TABLET | Refills: 0 | Status: SHIPPED | OUTPATIENT
Start: 2023-12-29 | End: 2024-01-23

## 2023-12-29 NOTE — TELEPHONE ENCOUNTER
----- Message from Adelaida Boyd sent at 12/29/2023  8:45 AM CST -----  Contact: [phone]  [email or web address]     Patient is calling for Medical Advice regarding: pt said she called and talked to someone yesterday about fitting her in today but never  got a call back     How long has patient had these symptoms:    Pharmacy name and phone#:    Would like response via [facility]: call back     Comments:

## 2023-12-29 NOTE — TELEPHONE ENCOUNTER
----- Message from Asia Jason sent at 12/28/2023  3:30 PM CST -----  Contact: Padmini   Padmini would like a call back. She has a possible painful boil on neck

## 2023-12-29 NOTE — PATIENT INSTRUCTIONS
Start bactrim DS 1 tablet twice a day for 7 days  Continue to do warm compresses three times a day  Ibuprofen up to three times a day as needed for pain relief.     Return to clinic as needed.     Do not drink alcohol while taking bactrim, it will cause you to feel very very ill.

## 2023-12-29 NOTE — PROGRESS NOTES
Subjective:       Patient ID: Padmini Pollock is a 58 y.o. female.    Chief Complaint: Recurrent Skin Infections      HPI  Padmini Pollock is a 58 y.o. year old female with carbuncle on back of right neck.  removed the hair follicle and it drained a bit, but has since closed up and is now irritated. Denies any fever, chills. Does report neck pain at sight of lesion.    Review of Systems   Constitutional:  Negative for chills and fever.   Musculoskeletal:  Positive for neck pain.   Neurological:  Negative for weakness and numbness.         Past Medical History:   Diagnosis Date    COVID-19 12/20 and 12/21    Depression     Endometriosis     Family history of breast cancer in mother     Hx of migraine headaches     Hyperlipidemia     Hyperlipidemia 06/30/2021    Hypertension 06/30/2021    Menopause     Prolapsing mitral leaflet syndrome         Prior to Admission medications    Medication Sig Start Date End Date Taking? Authorizing Provider   albuterol (PROAIR HFA) 90 mcg/actuation inhaler Inhale 2 puffs into the lungs every 6 (six) hours as needed for Wheezing. Rescue 4/20/23 4/19/24 Yes Elvia Chacko MD   atenoloL (TENORMIN) 25 MG tablet Take 25 mg by mouth once daily.   Yes Provider, Historical   cyclobenzaprine (FLEXERIL) 5 MG tablet 1 po QHS prn neck pain 8/9/23  Yes Elvia Chacko MD   estradioL (ESTRACE) 1 MG tablet Take 1 tablet (1 mg total) by mouth once daily. 4/24/23  Yes Opal Jimenez MD   losartan (COZAAR) 25 MG tablet Take 1 tablet (25 mg total) by mouth once daily. 8/9/23 8/8/24 Yes Elvia Chacko MD   nitrofurantoin (MACRODANTIN) 50 MG capsule TAKE 1 CAPSULE BY MOUTH NIGHTLY AS NEEDED FOR (INTERCOURSE). TAKE EVENING OF AND NEXT MORNING 3/19/23  Yes Provider, Historical   pravastatin (PRAVACHOL) 20 MG tablet Take 1 tablet by mouth once daily 7/4/23  Yes Elvia Chacko MD   vitamin D (VITAMIN D3) 1000 units Tab Take 1 tablet (1,000 Units total) by mouth once  "daily. 4/20/23  Yes Elvia Chacko MD   meloxicam (MOBIC) 7.5 MG tablet 1 po daily prn neck pain to be taken with food 8/9/23 12/29/23 Yes Elvia Chacko MD   ibuprofen (ADVIL,MOTRIN) 600 MG tablet Take 1 tablet (600 mg total) by mouth 3 (three) times daily. With food 12/29/23   Steve Hodges MD   sulfamethoxazole-trimethoprim 800-160mg (BACTRIM DS) 800-160 mg Tab Take 1 tablet by mouth 2 (two) times daily. 12/29/23   Steve Hodges MD        Past medical history, surgical history, and family medical history reviewed and updated as appropriate.    Medications and allergies reviewed.     Objective:          Vitals:    12/29/23 1434   BP: 134/82   BP Location: Right arm   Patient Position: Sitting   Pulse: 84   SpO2: 98%   Weight: 70.1 kg (154 lb 8.7 oz)   Height: 5' 2" (1.575 m)     Body mass index is 28.27 kg/m².  Physical Exam  Neck:        Comments: 8 mm induration with surrounding erythema    Lab Results   Component Value Date    WBC 5.98 04/26/2023    HGB 13.5 04/26/2023    HCT 41.3 04/26/2023     04/26/2023    CHOL 180 04/26/2023    TRIG 130 04/26/2023    HDL 51 04/26/2023    ALT 16 10/17/2023    AST 18 10/17/2023     10/17/2023    K 4.0 10/17/2023     10/17/2023    CREATININE 0.8 10/17/2023    BUN 14 10/17/2023    CO2 26 10/17/2023    TSH 1.255 04/26/2023    INR 1.0 08/04/2006       Assessment:       1. Abscess of skin of neck    2. Neck pain          Plan:     Padmini was seen today for recurrent skin infections.    Diagnoses and all orders for this visit:    Abscess of skin of neck  Comments:  had drained previously, now with pustule on top. will start bactrim ds x 7 day. Discussed warm compresses 3-4 times a day.  Orders:  -     sulfamethoxazole-trimethoprim 800-160mg (BACTRIM DS) 800-160 mg Tab; Take 1 tablet by mouth 2 (two) times daily.  -     ibuprofen (ADVIL,MOTRIN) 600 MG tablet; Take 1 tablet (600 mg total) by mouth 3 (three) times daily. With food    Neck pain  -     " ibuprofen (ADVIL,MOTRIN) 600 MG tablet; Take 1 tablet (600 mg total) by mouth 3 (three) times daily. With food        Health maintenance reviewed with patient.     Follow up if symptoms worsen or fail to improve.    Steve Hodges MD  Internal Medicine / Primary Care  [facility]  12/29/2023

## 2024-01-05 ENCOUNTER — PATIENT MESSAGE (OUTPATIENT)
Dept: PRIMARY CARE CLINIC | Facility: CLINIC | Age: 59
End: 2024-01-05
Payer: COMMERCIAL

## 2024-01-05 NOTE — TELEPHONE ENCOUNTER
Spoke to pt. Pt has some concerns regarding 12/29 visit and would like to speak with provider. Pt did state that she is not taking bactrim as prescribed due to it being too strong. Pt is taking it once daily.

## 2024-01-05 NOTE — TELEPHONE ENCOUNTER
I'm sorry she had a poor experience. She needs to be on Abx per note. It is hard to eval without being seen.  I would likely have also Rx Bactrim for an abscess if I had seen her. Unfortunately I am out of office Mon/Tues. Is the area worsening? Has it drained? Can she send a pic? IF worsening may need an I&D and may need to go to UC or ER.  Dr. CHAMPION

## 2024-01-08 ENCOUNTER — PATIENT MESSAGE (OUTPATIENT)
Dept: PRIMARY CARE CLINIC | Facility: CLINIC | Age: 59
End: 2024-01-08
Payer: COMMERCIAL

## 2024-01-08 NOTE — TELEPHONE ENCOUNTER
See if we have anything that is open on Wed. IS the area painful? Is it getting bigger?  Is it draining?  Any fever.  Rec warm compresses to the area Twice daily. What was the issue specifically with the Bactrim andhow many doses did  she take?  Dr. CHAMPION

## 2024-01-10 NOTE — TELEPHONE ENCOUNTER
This is not appropriate dosing which is likely why it has not resolved.  She needs to be seen and needs a new Rx for Abx.  Can we add her at 3:30 tomorrow  Dr CHAMPION

## 2024-01-21 NOTE — PROGRESS NOTES
[facility] Note    Chief Complaint      Chief Complaint   Patient presents with    Follow-up       History of Present Illness      Padmini Pollock is a 58 y.o. female   with Heptic cyst, Simple Renal cyst, GERD, HLD, Postmenopausal on HRT, HTN, Colon Polyps presents to fu  well visit. Referred by Mom Christianne. Last visit - 10/17/23    Rt post neck Lump - Abscess seen by Dr. Hodges in  12/29/23 tx with Bactrim which she did not take as Rx. She only took 3 d due to nausea and HA and then started taking 1/2 tab BID.  Rec warm compresses to the area Twice daily. DDX incl Epidermal inclusion cyst, Abscess, Lymphadenitis. It has gotten smaller per pt. It is no longer tender. No F,C, NS. No exudate. No unexplained wt loss. She reports 1 month prior she had similar lesions to her RT ant neck that had white discharge.      Chronic Neck pain - having neck issues since slipping back in February on vacation and felt she pulled something in her neck. It comes and goes. I would like to get it checked. No numbness/tingling or weakness. Can't lie on her RT side at night. Hurts if she turns her neck to the Rt. 2/10 in severity. It does not stop her from doing anything. She has not taken any medication for it. Xray C -spine - 8/9/23 - showed Mild Degenerative Joint disease (Osteoarthritis).  The disc spaces are narrowed between C4 and C6 vertebral segments.  No fracture or dislocation. Will refer to back and spine. No numbness/tingling or weakness. She never really took the meloxicam or the flexeril I rx for her in Aug. I rec she try it.       HTN - Uncontrolled on Atenolol  25 mg QHS and Losartan 25 mg 1 tab po QAm. Rec low sodium diet. If still high will change Atenolol to Amlodipine. Monitor for any edema. Cont to monitor BP at home. She declined changing BP meds for lifestyle modification.     Recurrent Urinary Sx's -  She is s/p partial Hys. She is on HRT.  She never fu with . She is on Macrobid prn per OB.  Doing  "better.       Fatty Liver/Hepatic Cysts - Abd U/S -5/13/22 - Numerous simple and mildly complex hepatic cysts. +Fatty liver. Rec limit tylenol and alcohol.  Rec diet and exercise for wt loss.  As discussed at visit there is a potential for cirrhosis. Her Liver functions are back to normal. Fu by Dr. Avelar.  Elastography - 10/13/22 Fibrosis stage 0-1 Steatosis Grade < 1.  Due for fu.      Hepatic cyst -  Fu by Hepatology, Dr. Avelar.  MRI abd - 11/2/22 - Multiple renal and hepatic cysts.   Will repeat U/s in 1 yr.      Simple Renal cyst - Abd U/S - 5/13/22 -  Multiple cysts.  Lgst at the lower pole  a 4.3 cm cyst.     GERD - Rec reflux prec. Takes tums prn rarely.      HLD - Pt on Pravastatin 20 mg QHS - controlled in Apr.  She prev had labs with Cards, Dr. Dobson.  Prefers not to go back to him.      Postmenopausal on HRT - Pt on estrace per OB.       Colon Polyps - Fu by  Dr. Mayorga.       Cough variant asthma - saw Dr. Kovacs in past. She has not needed inhaler often.   She reports PFT's were "borderline". Rarely needs her inhaler. Tirgger cold weather.      Overweight - BMI - 28.51- Up 2  lb.  Rec low carb diet and exercise.       Hemorrhoids - Rec adeq hydration.  Rec inc fiber in diet.  Rec stool softener Qday-BID and Miralax prn. conservative management options for symptoms related to hemorrhoids. Irritation or itching can be treated with Lidocaine cream/ointment, over-the-counter Hydrocortisone suppositories, warm baths or soaking. No bleeding.      HCM - Flu - 9/2020 - due- pt declines;  Tdap -1/26/19;  PCV 13 - none;  PVX 23 - none- pt defers;   Shingrix - none- defers;  COVID - 19 Vaccine (Pfizer)  #1 2/24/21; #2 3/17/21; and # 3 - due; MGM -  4/1/23 - at [facility]- repeat 1 yr;  DEXA - 7/1/22 - neg.;  PAP - 3/22/22- neg; Hep C Screen - 11/3/21 - neg.;  HIV Screen - 11/3/21 - neg.; C-scope - 8/16/22 - hemorrhoids, angioectasia, polyp- repeat 5 yrs;    Prev PCP - none;  Ob/GYN - Dr. Jimenez; Prev Cards - Dr." Bronson; GI - Dr. Mayorga; Pulm - Dr. Kovacs; Well visit - 10/17/23    Patient Care Team:  Elvia Chacko MD as PCP - General (Internal Medicine)  Kenyatta Ramirez MA as Care Coordinator  Jose Roberto Mayorga MD as Consulting Physician (Gastroenterology)     Health Maintenance:  Immunization History   Administered Date(s) Administered    COVID-19, MRNA, LN-S, PF (Pfizer) (Purple Cap) 2021, 2021    Influenza - Quadrivalent - MDCK - PF 10/06/2018, 2019    Influenza - Quadrivalent - PF *Preferred* (6 months and older) 2020    Influenza - Trivalent (ADULT) 2015    Tdap 2019      Health Maintenance   Topic Date Due    Shingles Vaccine (1 of 2) Never done    Mammogram  2024    DEXA Scan  2024    Colorectal Cancer Screening  2027    Lipid Panel  2028    TETANUS VACCINE  2029    Hepatitis C Screening  Completed        Past Medical History:  Past Medical History:   Diagnosis Date    COVID-19      and     Depression     Endometriosis     Family history of breast cancer in mother     Hx of migraine headaches     Hyperlipidemia     Hyperlipidemia 2021    Hypertension 2021    Menopause     Prolapsing mitral leaflet syndrome        Past Surgical History:   has a past surgical history that includes  section; Hysterectomy (); and Colonoscopy ().    Family History:  family history includes Breast cancer in her maternal aunt and mother; Heart attack in her father; Heart disease (age of onset: 65) in her father; Hypertension in her father and mother; No Known Problems in her sister.     Social History:  Social History     Tobacco Use    Smoking status: Never     Passive exposure: Never    Smokeless tobacco: Never   Substance Use Topics    Alcohol use: Yes     Comment: rare - once every 6 mos    Drug use: Never       Review of Systems   Constitutional:  Negative for chills, diaphoresis, fever and unexpected weight change.    HENT:  Negative for nasal congestion, rhinorrhea and sore throat.    Respiratory:  Positive for shortness of breath. Negative for cough, chest tightness and wheezing.         Inhaler has helped - trigger cold weather.    Cardiovascular:  Negative for chest pain and palpitations.   Gastrointestinal:  Negative for constipation, diarrhea, nausea and vomiting.   Neurological:  Positive for headaches. Negative for dizziness.        Medications:    Current Outpatient Medications:     albuterol (PROAIR HFA) 90 mcg/actuation inhaler, Inhale 2 puffs into the lungs every 6 (six) hours as needed for Wheezing. Rescue, Disp: 18 g, Rfl: 0    cyclobenzaprine (FLEXERIL) 5 MG tablet, 1 po QHS prn neck pain, Disp: 30 tablet, Rfl: 0    estradioL (ESTRACE) 1 MG tablet, Take 1 tablet (1 mg total) by mouth once daily., Disp: 90 tablet, Rfl: 3    ibuprofen (ADVIL,MOTRIN) 600 MG tablet, Take 1 tablet (600 mg total) by mouth 3 (three) times daily. With food, Disp: 21 tablet, Rfl: 0    nitrofurantoin (MACRODANTIN) 50 MG capsule, TAKE 1 CAPSULE BY MOUTH NIGHTLY AS NEEDED FOR (INTERCOURSE). TAKE EVENING OF AND NEXT MORNING, Disp: , Rfl:     pravastatin (PRAVACHOL) 20 MG tablet, Take 1 tablet by mouth once daily, Disp: 90 tablet, Rfl: 3    vitamin D (VITAMIN D3) 1000 units Tab, Take 1 tablet (1,000 Units total) by mouth once daily., Disp: 1 tablet, Rfl: 0    atenoloL (TENORMIN) 25 MG tablet, Take 1 tablet (25 mg total) by mouth once daily., Disp: 90 tablet, Rfl: 1    doxycycline (VIBRA-TABS) 100 MG tablet, Take 1 tablet (100 mg total) by mouth 2 (two) times daily. for 7 days, Disp: 14 tablet, Rfl: 0    losartan (COZAAR) 25 MG tablet, Take 1 tablet (25 mg total) by mouth once daily., Disp: 90 tablet, Rfl: 1     Allergies:  Review of patient's allergies indicates:  No Known Allergies    Physical Exam      Vital Signs  Temp: 97.9 °F (36.6 °C)  Temp Source: Oral  Pulse: 90  SpO2: 97 %  BP: 136/80  BP Location: Left arm  Patient Position:  "Sitting  Pain Score: 0-No pain  Height and Weight  Height: 5' 2" (157.5 cm)  Weight: 70.7 kg (155 lb 13.8 oz)  BSA (Calculated - sq m): 1.76 sq meters  BMI (Calculated): 28.5  Weight in (lb) to have BMI = 25: 136.4      Patient Position: Sitting      Physical Exam  Vitals reviewed.   Constitutional:       General: She is not in acute distress.     Appearance: Normal appearance. She is not ill-appearing, toxic-appearing or diaphoretic.   HENT:      Head: Normocephalic and atraumatic.   Cardiovascular:      Rate and Rhythm: Normal rate and regular rhythm.      Pulses: Normal pulses.      Heart sounds: Normal heart sounds. No murmur heard.  Pulmonary:      Effort: No respiratory distress.      Breath sounds: Normal breath sounds. No wheezing.   Skin:     Comments: Indurated palpable subcutaneous lump to Rt post neck; NTTP, no warmth.    Neurological:      Mental Status: She is alert.   Psychiatric:         Mood and Affect: Mood normal.         Behavior: Behavior normal.          Laboratory:  CBC:  Recent Labs   Lab 11/03/21  0000 04/26/23  0824   WBC 7.1 5.98   RBC 4.27 4.30   Hemoglobin 13.8 13.5   Hematocrit 40.6 41.3   Platelets 337 296   MCV 95.1 96   MCH 32.2 31.4 H   MCHC 33.9 32.7       CMP:  Recent Labs   Lab 07/01/22  0820 07/01/22  0820 04/26/23  0824 10/17/23  1348   Glucose  --   --  81 102   Calcium  --   --  8.8 8.8   Albumin 3.7  --  3.5 3.5   Total Protein 7.2  --  6.8 6.9   Sodium  --   --  141 141   Potassium  --   --  4.0 4.0   CO2  --   --  28 26   Chloride  --   --  107 106   BUN  --    < > 16 14   Creatinine  --   --  0.9 0.8   Alkaline Phosphatase 91  --  82 85   ALT 18  --  16 16   AST 19  --  20 18   Total Bilirubin 0.4  --  0.4 0.2    < > = values in this interval not displayed.           URINALYSIS:  Recent Labs   Lab 08/02/22  1329   Color, UA Yellow   Specific Gravity, UA 1.005   pH, UA 6.0   Protein, UA 1+ A   Bacteria Occasional   Nitrite, UA Negative   Leukocytes, UA 3+ A   Hyaline " Casts, UA 0        LIPIDS:  Recent Labs   Lab 11/03/21  0000 04/26/23  0824   TSH 1.90 1.255   HDL 50 51   Cholesterol 203 H 180   Triglycerides 142 130   LDL Cholesterol  --  103.0   LDL Calculated 130 H  --    HDL/Cholesterol Ratio  --  28.3   Non-HDL Cholesterol 153 H 129   Total Cholesterol/HDL Ratio  --  3.5       TSH:  Recent Labs   Lab 11/03/21  0000 04/26/23  0824   TSH 1.90 1.255            Recent Labs   Lab 11/03/21  0000   Hepatitis C Ab NON-REACTIVE       Assessment/Plan     Padmini Pollock is a 58 y.o.female with:    Localized swelling, mass and lump, neck  -     US Soft Tissue Head Neck Thyroid; Future; Expected date: 01/23/2024  -     doxycycline (VIBRA-TABS) 100 MG tablet; Take 1 tablet (100 mg total) by mouth 2 (two) times daily. for 7 days  Dispense: 14 tablet; Refill: 0  -     CBC Auto Differential; Future; Expected date: 01/23/2024  -     LACTATE DEHYDROGENASE; Future; Expected date: 01/23/2024  - Suspect likely abscess that is healing. Getting better per pt since taking abx but she did not take as Rx as she did not lexa Bactrim. She will alert me if this changes. Will Rx Doxycycline x 7 d. Check U/S, LDH, CBC. Pt very concerned given fam h/o Ca.     Hypertension, unspecified type  -     atenoloL (TENORMIN) 25 MG tablet; Take 1 tablet (25 mg total) by mouth once daily.  Dispense: 90 tablet; Refill: 1  -     losartan (COZAAR) 25 MG tablet; Take 1 tablet (25 mg total) by mouth once daily.  Dispense: 90 tablet; Refill: 1  - Controlled.  Cont current.        Chronic conditions status updated as per HPI.  Other than changes above, cont current medications and maintain follow up with specialists.    Follow up in about 4 months (around 5/23/2024) for fu chronic issues or sooner if needed.      Elvia Chacko MD  [facility]

## 2024-01-23 ENCOUNTER — OFFICE VISIT (OUTPATIENT)
Dept: PRIMARY CARE CLINIC | Facility: CLINIC | Age: 59
End: 2024-01-23
Payer: COMMERCIAL

## 2024-01-23 ENCOUNTER — LAB VISIT (OUTPATIENT)
Dept: LAB | Facility: HOSPITAL | Age: 59
End: 2024-01-23
Attending: INTERNAL MEDICINE
Payer: COMMERCIAL

## 2024-01-23 VITALS
OXYGEN SATURATION: 97 % | DIASTOLIC BLOOD PRESSURE: 80 MMHG | WEIGHT: 155.88 LBS | HEART RATE: 90 BPM | SYSTOLIC BLOOD PRESSURE: 136 MMHG | TEMPERATURE: 98 F | BODY MASS INDEX: 28.69 KG/M2 | HEIGHT: 62 IN

## 2024-01-23 DIAGNOSIS — R22.1 LOCALIZED SWELLING, MASS AND LUMP, NECK: ICD-10-CM

## 2024-01-23 DIAGNOSIS — R22.1 LOCALIZED SWELLING, MASS AND LUMP, NECK: Primary | ICD-10-CM

## 2024-01-23 DIAGNOSIS — I10 HYPERTENSION, UNSPECIFIED TYPE: ICD-10-CM

## 2024-01-23 PROCEDURE — 99999 PR PBB SHADOW E&M-EST. PATIENT-LVL V: CPT | Mod: PBBFAC,,, | Performed by: INTERNAL MEDICINE

## 2024-01-23 PROCEDURE — 85025 COMPLETE CBC W/AUTO DIFF WBC: CPT | Performed by: INTERNAL MEDICINE

## 2024-01-23 PROCEDURE — 99214 OFFICE O/P EST MOD 30 MIN: CPT | Mod: S$GLB,,, | Performed by: INTERNAL MEDICINE

## 2024-01-23 PROCEDURE — 1159F MED LIST DOCD IN RCRD: CPT | Mod: CPTII,S$GLB,, | Performed by: INTERNAL MEDICINE

## 2024-01-23 PROCEDURE — 3008F BODY MASS INDEX DOCD: CPT | Mod: CPTII,S$GLB,, | Performed by: INTERNAL MEDICINE

## 2024-01-23 PROCEDURE — 83615 LACTATE (LD) (LDH) ENZYME: CPT | Performed by: INTERNAL MEDICINE

## 2024-01-23 PROCEDURE — 4010F ACE/ARB THERAPY RXD/TAKEN: CPT | Mod: CPTII,S$GLB,, | Performed by: INTERNAL MEDICINE

## 2024-01-23 PROCEDURE — 36415 COLL VENOUS BLD VENIPUNCTURE: CPT | Mod: PN | Performed by: INTERNAL MEDICINE

## 2024-01-23 PROCEDURE — 3075F SYST BP GE 130 - 139MM HG: CPT | Mod: CPTII,S$GLB,, | Performed by: INTERNAL MEDICINE

## 2024-01-23 PROCEDURE — 1160F RVW MEDS BY RX/DR IN RCRD: CPT | Mod: CPTII,S$GLB,, | Performed by: INTERNAL MEDICINE

## 2024-01-23 PROCEDURE — 3079F DIAST BP 80-89 MM HG: CPT | Mod: CPTII,S$GLB,, | Performed by: INTERNAL MEDICINE

## 2024-01-23 RX ORDER — LOSARTAN POTASSIUM 25 MG/1
25 TABLET ORAL DAILY
Qty: 90 TABLET | Refills: 1 | Status: SHIPPED | OUTPATIENT
Start: 2024-01-23 | End: 2025-01-22

## 2024-01-23 RX ORDER — ATENOLOL 25 MG/1
25 TABLET ORAL DAILY
Qty: 90 TABLET | Refills: 1 | Status: SHIPPED | OUTPATIENT
Start: 2024-01-23

## 2024-01-23 RX ORDER — DOXYCYCLINE HYCLATE 100 MG
100 TABLET ORAL 2 TIMES DAILY
Qty: 14 TABLET | Refills: 0 | Status: SHIPPED | OUTPATIENT
Start: 2024-01-23 | End: 2024-01-30

## 2024-01-24 LAB
BASOPHILS # BLD AUTO: 0.03 K/UL (ref 0–0.2)
BASOPHILS NFR BLD: 0.4 % (ref 0–1.9)
DIFFERENTIAL METHOD BLD: ABNORMAL
EOSINOPHIL # BLD AUTO: 0 K/UL (ref 0–0.5)
EOSINOPHIL NFR BLD: 0.4 % (ref 0–8)
ERYTHROCYTE [DISTWIDTH] IN BLOOD BY AUTOMATED COUNT: 12.3 % (ref 11.5–14.5)
HCT VFR BLD AUTO: 39.2 % (ref 37–48.5)
HGB BLD-MCNC: 12.9 G/DL (ref 12–16)
IMM GRANULOCYTES # BLD AUTO: 0.03 K/UL (ref 0–0.04)
IMM GRANULOCYTES NFR BLD AUTO: 0.4 % (ref 0–0.5)
LDH SERPL L TO P-CCNC: 173 U/L (ref 110–260)
LYMPHOCYTES # BLD AUTO: 2.3 K/UL (ref 1–4.8)
LYMPHOCYTES NFR BLD: 29.8 % (ref 18–48)
MCH RBC QN AUTO: 31.8 PG (ref 27–31)
MCHC RBC AUTO-ENTMCNC: 32.9 G/DL (ref 32–36)
MCV RBC AUTO: 97 FL (ref 82–98)
MONOCYTES # BLD AUTO: 0.5 K/UL (ref 0.3–1)
MONOCYTES NFR BLD: 6.1 % (ref 4–15)
NEUTROPHILS # BLD AUTO: 4.9 K/UL (ref 1.8–7.7)
NEUTROPHILS NFR BLD: 62.9 % (ref 38–73)
NRBC BLD-RTO: 0 /100 WBC
PLATELET # BLD AUTO: 303 K/UL (ref 150–450)
PMV BLD AUTO: 10.9 FL (ref 9.2–12.9)
RBC # BLD AUTO: 4.06 M/UL (ref 4–5.4)
WBC # BLD AUTO: 7.71 K/UL (ref 3.9–12.7)

## 2024-01-24 NOTE — PROGRESS NOTES
I sent pt a my chart message -  I reviewed your labs.  Your White blood cell count was normal. Your LDH was normal.   You are not anemic. No further recommendations at this time.    Dr. CHAMPION

## 2024-01-25 ENCOUNTER — HOSPITAL ENCOUNTER (OUTPATIENT)
Dept: RADIOLOGY | Facility: HOSPITAL | Age: 59
Discharge: HOME | End: 2024-01-25
Attending: INTERNAL MEDICINE
Payer: COMMERCIAL

## 2024-01-25 DIAGNOSIS — R22.1 LOCALIZED SWELLING, MASS AND LUMP, NECK: ICD-10-CM

## 2024-01-25 PROCEDURE — 76536 US EXAM OF HEAD AND NECK: CPT | Mod: 26,,, | Performed by: RADIOLOGY

## 2024-01-25 PROCEDURE — 76536 US EXAM OF HEAD AND NECK: CPT | Mod: TC

## 2024-01-26 ENCOUNTER — PATIENT MESSAGE (OUTPATIENT)
Dept: PRIMARY CARE CLINIC | Facility: CLINIC | Age: 59
End: 2024-01-26
Payer: COMMERCIAL

## 2024-01-26 ENCOUNTER — TELEPHONE (OUTPATIENT)
Dept: PRIMARY CARE CLINIC | Facility: CLINIC | Age: 59
End: 2024-01-26
Payer: COMMERCIAL

## 2024-01-26 NOTE — TELEPHONE ENCOUNTER
----- Message from Elvia Henry sent at 1/26/2024  8:26 AM CST -----  Regarding: Hi Seg[place]ia  Contact: [phone]  Type:  Needs Hi segovia    Who Called: Padmini  Would the patient rather a call back or a response via [facility]? Call  Best Call Back Number: [phone]  Additional Information: Need drs note for US done yesterday

## 2024-01-26 NOTE — TELEPHONE ENCOUNTER
This is a dup msg. I have been communicating with pt via [employer] this morning already regarding this

## 2024-01-26 NOTE — PROGRESS NOTES
I sent pt a my chart message -  I reviewed your Neck U/S - It showed Small normal appearing lymph node in the right neck corresponding to palpable abnormality.  Dr. CHAMPION

## 2024-02-27 DIAGNOSIS — J45.991 COUGH VARIANT ASTHMA: ICD-10-CM

## 2024-02-27 NOTE — TELEPHONE ENCOUNTER
Care Due:                  Date            Visit Type   Department     Provider  --------------------------------------------------------------------------------                                EP -                              PRIMARY      [facility]  Last Visit: 01-      CARE ([facility])   AIDE Chacko  Next Visit: None Scheduled  None         None Found                                                            Last  Test          Frequency    Reason                     Performed    Due Date  --------------------------------------------------------------------------------    Lipid Panel.  12 months..  pravastatin..............  04- 04-    Vitamin D...  12 months..  vitamin..................  Not Found    Overdue    [employer] Embedded Care Due Messages. Reference number: [id number].   2/27/2024 11:27:01 AM CST

## 2024-02-28 RX ORDER — ALBUTEROL SULFATE 90 UG/1
2 AEROSOL, METERED RESPIRATORY (INHALATION) EVERY 6 HOURS PRN
Qty: 18 G | Refills: 0
Start: 2024-02-28 | End: 2024-03-19 | Stop reason: SDUPTHER

## 2024-03-18 ENCOUNTER — PATIENT MESSAGE (OUTPATIENT)
Dept: PRIMARY CARE CLINIC | Facility: CLINIC | Age: 59
End: 2024-03-18
Payer: COMMERCIAL

## 2024-03-18 DIAGNOSIS — J45.991 COUGH VARIANT ASTHMA: ICD-10-CM

## 2024-03-19 RX ORDER — ALBUTEROL SULFATE 90 UG/1
2 AEROSOL, METERED RESPIRATORY (INHALATION) EVERY 6 HOURS PRN
Qty: 18 G | Refills: 0 | Status: SHIPPED | OUTPATIENT
Start: 2024-03-19 | End: 2025-03-19

## 2024-03-19 NOTE — TELEPHONE ENCOUNTER
No care due was identified.  [facility] Embedded Care Due Messages. Reference number: [id number].   3/19/2024 7:24:23 AM CDT

## 2024-04-03 ENCOUNTER — PATIENT MESSAGE (OUTPATIENT)
Dept: OBSTETRICS AND GYNECOLOGY | Facility: CLINIC | Age: 59
End: 2024-04-03
Payer: COMMERCIAL

## 2024-04-03 DIAGNOSIS — Z12.31 BREAST CANCER SCREENING BY MAMMOGRAM: Primary | ICD-10-CM

## 2024-04-09 DIAGNOSIS — N95.1 MENOPAUSAL STATE: ICD-10-CM

## 2024-04-09 RX ORDER — ESTRADIOL 1 MG/1
1 TABLET ORAL
Qty: 90 TABLET | Refills: 0 | Status: SHIPPED | OUTPATIENT
Start: 2024-04-09

## 2024-04-09 NOTE — TELEPHONE ENCOUNTER
Refill Routing Note   Medication(s) are not appropriate for processing by [facility] for the following reason(s):        Required labs outdated: mammogram    [facility] action(s):  Defer               Appointments  past 12m or future 3m with PCP    Date Provider   Last Visit   3/28/2023 Opal Jimenez MD   Next Visit   Visit date not found Opal Jimenez MD   ED visits in past 90 days: 0        Note composed:4:21 PM 04/09/2024

## 2024-07-04 DIAGNOSIS — E78.5 HYPERLIPIDEMIA, UNSPECIFIED HYPERLIPIDEMIA TYPE: ICD-10-CM

## 2024-07-04 NOTE — TELEPHONE ENCOUNTER
Care Due:                  Date            Visit Type   Department     Provider  --------------------------------------------------------------------------------                                EP -                              PRIMARY      [facility]  Last Visit: 01-      CARE ([facility])   AIDE Chacko  Next Visit: None Scheduled  None         None Found                                                            Last  Test          Frequency    Reason                     Performed    Due Date  --------------------------------------------------------------------------------    Lipid Panel.  12 months..  pravastatin..............  04- 04-    Vitamin D...  12 months..  vitamin..................  Not Found    Overdue    [employer] Embedded Care Due Messages. Reference number: [id number].   7/04/2024 7:00:39 AM CDT

## 2024-07-05 RX ORDER — PRAVASTATIN SODIUM 20 MG/1
TABLET ORAL
Qty: 90 TABLET | Refills: 0 | Status: SHIPPED | OUTPATIENT
Start: 2024-07-05

## 2024-07-05 NOTE — TELEPHONE ENCOUNTER
Refill Routing Note   Medication(s) are not appropriate for processing by [facility] for the following reason(s):        Required labs outdated    [facility] action(s):  Defer     Requires labs : Yes             Appointments  past 12m or future 3m with PCP    Date Provider   Last Visit   1/23/2024 Elvia Chacko MD   Next Visit   Visit date not found Elvia Chacko MD   ED visits in past 90 days: 0        Note composed:11:19 AM 07/05/2024

## 2024-07-08 DIAGNOSIS — E78.5 HYPERLIPIDEMIA, UNSPECIFIED HYPERLIPIDEMIA TYPE: ICD-10-CM

## 2024-07-08 NOTE — TELEPHONE ENCOUNTER
No care due was identified.  [facility] Embedded Care Due Messages. Reference number: [id number].   7/08/2024 11:21:38 AM CDT

## 2024-07-09 RX ORDER — PRAVASTATIN SODIUM 20 MG/1
TABLET ORAL
Qty: 90 TABLET | Refills: 0 | OUTPATIENT
Start: 2024-07-09

## 2024-07-09 NOTE — TELEPHONE ENCOUNTER
Refill Decision Note   Padmini Sarahmichelle  is requesting a refill authorization.  Brief Assessment and Rationale for Refill:  Quick Discontinue     Medication Therapy Plan:  Receipt confirmed by pharmacy (7/5/2024 11:43 AM CDT)      Comments:     Note composed:7:56 AM 07/09/2024

## 2024-07-11 DIAGNOSIS — N95.1 MENOPAUSAL STATE: ICD-10-CM

## 2024-07-11 RX ORDER — ESTRADIOL 1 MG/1
1 TABLET ORAL
Qty: 90 TABLET | Refills: 0 | Status: SHIPPED | OUTPATIENT
Start: 2024-07-11

## 2024-07-11 NOTE — TELEPHONE ENCOUNTER
Refill Routing Note   Medication(s) are not appropriate for processing by [facility] for the following reason(s):        Patient not seen by provider within 15 months    [facility] action(s):  Defer               Appointments  past 12m or future 3m with PCP    Date Provider   Last Visit   3/28/2023 Opal Jimenez MD   Next Visit   Visit date not found Opal Jimenez MD   ED visits in past 90 days: 0        Note composed:7:25 AM 07/11/2024

## 2024-10-01 DIAGNOSIS — E78.5 HYPERLIPIDEMIA, UNSPECIFIED HYPERLIPIDEMIA TYPE: ICD-10-CM

## 2024-10-01 RX ORDER — PRAVASTATIN SODIUM 20 MG/1
TABLET ORAL
Qty: 90 TABLET | Refills: 0 | Status: SHIPPED | OUTPATIENT
Start: 2024-10-01

## 2024-10-01 NOTE — TELEPHONE ENCOUNTER
Care Due:                  Date            Visit Type   Department     Provider  --------------------------------------------------------------------------------                                EP -                              PRIMARY      [facility]  Last Visit: 01-      CARE ([facility])   AIDE Chacko  Next Visit: None Scheduled  None         None Found                                                            Last  Test          Frequency    Reason                     Performed    Due Date  --------------------------------------------------------------------------------    CMP.........  12 months..  ibuprofen, losartan,       10-   10-                             pravastatin, vitamin.....    Lipid Panel.  12 months..  pravastatin..............  04- 04-    Vitamin D...  12 months..  vitamin..................  Not Found    Overdue    [employer] Embedded Care Due Messages. Reference number: [id number].   10/01/2024 7:00:57 AM CDT

## 2024-10-01 NOTE — TELEPHONE ENCOUNTER
Refill Routing Note   Medication(s) are not appropriate for processing by [facility] for the following reason(s):        Required labs outdated    [facility] action(s):  Defer     Requires labs : Yes             Appointments  past 12m or future 3m with PCP    Date Provider   Last Visit   Visit date not found Elvia Chacko MD   Next Visit   Visit date not found Elvia Chacko MD   ED visits in past 90 days: 0        Note composed:2:57 PM 10/01/2024

## 2024-10-10 DIAGNOSIS — I10 HYPERTENSION, UNSPECIFIED TYPE: ICD-10-CM

## 2024-10-10 DIAGNOSIS — N95.1 MENOPAUSAL STATE: ICD-10-CM

## 2024-10-10 RX ORDER — ESTRADIOL 1 MG/1
1 TABLET ORAL
Qty: 90 TABLET | Refills: 0 | Status: SHIPPED | OUTPATIENT
Start: 2024-10-10

## 2024-10-10 RX ORDER — LOSARTAN POTASSIUM 25 MG/1
25 TABLET ORAL
Qty: 90 TABLET | Refills: 0 | Status: SHIPPED | OUTPATIENT
Start: 2024-10-10

## 2024-10-10 NOTE — TELEPHONE ENCOUNTER
No care due was identified.  [facility] Embedded Care Due Messages. Reference number: [id number].   10/10/2024 7:00:45 AM CDT

## 2024-10-10 NOTE — TELEPHONE ENCOUNTER
Refill Routing Note   Medication(s) are not appropriate for processing by [facility] for the following reason(s):        Patient not seen by provider within 15 months  No active prescription written by provider    [facility] action(s):  Defer        Medication Therapy Plan: FOV in 2 weeks      Appointments  past 12m or future 3m with PCP    Date Provider   Last Visit   3/28/2023 Opal Jimenez MD   Next Visit   10/30/2024 Opal Jimenez MD   ED visits in past 90 days: 0        Note composed:12:37 PM 10/10/2024

## 2024-10-10 NOTE — TELEPHONE ENCOUNTER
Refill Decision Note   Padmini Phill  is requesting a refill authorization.  Brief Assessment and Rationale for Refill:  Approve     Medication Therapy Plan:         Comments:     Note composed:2:36 PM 10/10/2024

## 2024-10-15 ENCOUNTER — TELEPHONE (OUTPATIENT)
Dept: OBSTETRICS AND GYNECOLOGY | Facility: CLINIC | Age: 59
End: 2024-10-15
Payer: COMMERCIAL

## 2024-10-16 DIAGNOSIS — I10 HYPERTENSION, UNSPECIFIED TYPE: ICD-10-CM

## 2024-10-16 RX ORDER — ATENOLOL 25 MG/1
25 TABLET ORAL
Qty: 90 TABLET | Refills: 1 | Status: SHIPPED | OUTPATIENT
Start: 2024-10-16

## 2024-10-16 NOTE — TELEPHONE ENCOUNTER
Refill Decision Note   Padmini Phill  is requesting a refill authorization.  Brief Assessment and Rationale for Refill:  Approve     Medication Therapy Plan:        Comments:     Note composed:1:43 PM 10/16/2024

## 2024-10-16 NOTE — TELEPHONE ENCOUNTER
No care due was identified.  [facility] Embedded Care Due Messages. Reference number: [id number].   10/16/2024 7:00:40 AM CDT

## 2024-10-30 ENCOUNTER — OFFICE VISIT (OUTPATIENT)
Dept: OBSTETRICS AND GYNECOLOGY | Facility: CLINIC | Age: 59
End: 2024-10-30
Payer: COMMERCIAL

## 2024-10-30 VITALS
BODY MASS INDEX: 28.72 KG/M2 | WEIGHT: 156.06 LBS | HEIGHT: 62 IN | SYSTOLIC BLOOD PRESSURE: 132 MMHG | DIASTOLIC BLOOD PRESSURE: 80 MMHG

## 2024-10-30 DIAGNOSIS — Z01.419 ENCOUNTER FOR GYNECOLOGICAL EXAMINATION: Primary | ICD-10-CM

## 2024-10-30 DIAGNOSIS — N95.1 MENOPAUSAL STATE: ICD-10-CM

## 2024-10-30 PROCEDURE — 3079F DIAST BP 80-89 MM HG: CPT | Mod: CPTII,S$GLB,, | Performed by: STUDENT IN AN ORGANIZED HEALTH CARE EDUCATION/TRAINING PROGRAM

## 2024-10-30 PROCEDURE — 4010F ACE/ARB THERAPY RXD/TAKEN: CPT | Mod: CPTII,S$GLB,, | Performed by: STUDENT IN AN ORGANIZED HEALTH CARE EDUCATION/TRAINING PROGRAM

## 2024-10-30 PROCEDURE — 1159F MED LIST DOCD IN RCRD: CPT | Mod: CPTII,S$GLB,, | Performed by: STUDENT IN AN ORGANIZED HEALTH CARE EDUCATION/TRAINING PROGRAM

## 2024-10-30 PROCEDURE — 1160F RVW MEDS BY RX/DR IN RCRD: CPT | Mod: CPTII,S$GLB,, | Performed by: STUDENT IN AN ORGANIZED HEALTH CARE EDUCATION/TRAINING PROGRAM

## 2024-10-30 PROCEDURE — 3075F SYST BP GE 130 - 139MM HG: CPT | Mod: CPTII,S$GLB,, | Performed by: STUDENT IN AN ORGANIZED HEALTH CARE EDUCATION/TRAINING PROGRAM

## 2024-10-30 PROCEDURE — 99396 PREV VISIT EST AGE 40-64: CPT | Mod: S$GLB,,, | Performed by: STUDENT IN AN ORGANIZED HEALTH CARE EDUCATION/TRAINING PROGRAM

## 2024-10-30 PROCEDURE — 99999 PR PBB SHADOW E&M-EST. PATIENT-LVL III: CPT | Mod: PBBFAC,,, | Performed by: STUDENT IN AN ORGANIZED HEALTH CARE EDUCATION/TRAINING PROGRAM

## 2024-10-30 PROCEDURE — 3008F BODY MASS INDEX DOCD: CPT | Mod: CPTII,S$GLB,, | Performed by: STUDENT IN AN ORGANIZED HEALTH CARE EDUCATION/TRAINING PROGRAM

## 2024-10-30 RX ORDER — ESTRADIOL 1 MG/1
1 TABLET ORAL DAILY
Qty: 90 TABLET | Refills: 3 | Status: SHIPPED | OUTPATIENT
Start: 2024-10-30

## 2024-10-30 NOTE — PROGRESS NOTES
Chief Complaint: Well Woman Exam     HPI:      Padmini Pollock is a 59 y.o.  who presents for annual exam.  Pt had supracervical hysterectomy. Does well with estradiol, desires to continue.  Today patient's GYN complaints include: none.  Specifically, patient denies vaginal bleeding, discharge, pelvic pain, urinary problems.    Ms. Pollock is currently sexually active with a single male partner.    Previous Pap: NILM, HPV negative (3/28/2022)  Previous Mammogram: BiRads: 2  (24)    Most Recent Dexa: WNL (2022), Repeat in   Most Recent Colonoscopy: Benign polyps (), Repeat in     Past Medical History:   Diagnosis Date    COVID-19      and     Depression     Endometriosis     Family history of breast cancer in mother     Hx of migraine headaches     Hyperlipidemia     Hyperlipidemia 2021    Hypertension 2021    Menopause     Prolapsing mitral leaflet syndrome        Past Surgical History:   Procedure Laterality Date     SECTION      x 2    COLONOSCOPY  2016    Normal     HYSTERECTOMY  2011    Supra Cervical w/ Dr Jamie Mayorga (Outpt. facility possibly [facility])       Social History     Socioeconomic History    Marital status:    Tobacco Use    Smoking status: Never     Passive exposure: Never    Smokeless tobacco: Never   Substance and Sexual Activity    Alcohol use: Yes     Comment: rare - once every 6 mos    Drug use: Never    Sexual activity: Yes     Birth control/protection: Surgical     Comment: :      Supracervical Hyst         Family History   Problem Relation Name Age of Onset    Breast cancer Mother Christianne     Hypertension Mother Christianne     Heart attack Father      Heart disease Father  65    Hypertension Father      No Known Problems Sister      Breast cancer Maternal Aunt Nehal     Colon cancer Neg Hx      Ovarian cancer Neg Hx      Diabetes Neg Hx         Review of patient's allergies indicates:  No Known Allergies    OB History        "   2    Para   2    Term   2            AB        Living   2         SAB        IAB        Ectopic        Multiple        Live Births   2                 Physical Exam:      PHYSICAL EXAM:  /80 (BP Location: Left arm, Patient Position: Sitting)   Ht 5' 2" (1.575 m)   Wt 70.8 kg (156 lb 1.4 oz)   BMI 28.55 kg/m²   Body mass index is 28.55 kg/m².     APPEARANCE: Well nourished, well developed, in no acute distress.  PSYCH: Appropriate mood and affect.  SKIN: No acne or hirsutism  NECK: Neck symmetric without masses or thyromegaly  NODES: No inguinal, axillary, or supraclavicular lymph node enlargement  CHEST: Normal respiratory effort.  ABDOMEN: Soft.  No tenderness or masses.   BREASTS: Symmetrical, no visible skin lesions. No palpable masses. No nipple discharge bilaterally.  PELVIC: Normal external genitalia without lesions.  Normal hair distribution.  Adequate perineal body, normal urethral meatus.  Vagina moist and smooth. Without lesions. withoutdischarge.  Cervix pink, without lesions, discharge or tenderness.  No significant cystocele or rectocele.  Bimanual exam shows no midline or adnexal masses.  Uterus absent.    Assessment/Plan:     Encounter for gynecological examination    Menopausal state  -     estradioL (ESTRACE) 1 MG tablet; Take 1 tablet (1 mg total) by mouth once daily.  Dispense: 90 tablet; Refill: 3      - pelvic exam normal  - pap/hpv due   - MMG due 2025  - desires to continue HRT, estradiol refill to pharmacy  - RTC 1 yr or sooner prn      Counseling:     Patient was counseled today on current ASCCP pap guidelines, the recommendation for yearly physical exams, safe driving habits, breast self awareness and annual mammograms. She is to see her PCP for other health maintenance.       Use of the [facility] Patient Portal discussed and encouraged during today's visit.     "

## 2025-03-31 ENCOUNTER — PATIENT MESSAGE (OUTPATIENT)
Dept: OBSTETRICS AND GYNECOLOGY | Facility: CLINIC | Age: 60
End: 2025-03-31
Payer: COMMERCIAL

## 2025-03-31 DIAGNOSIS — Z12.31 SCREENING MAMMOGRAM FOR BREAST CANCER: Primary | ICD-10-CM

## 2025-04-04 ENCOUNTER — TELEPHONE (OUTPATIENT)
Dept: OBSTETRICS AND GYNECOLOGY | Facility: CLINIC | Age: 60
End: 2025-04-04

## 2025-04-09 DIAGNOSIS — I10 HYPERTENSION, UNSPECIFIED TYPE: ICD-10-CM

## 2025-04-09 RX ORDER — LOSARTAN POTASSIUM 25 MG/1
25 TABLET ORAL
Qty: 90 TABLET | Refills: 0 | Status: SHIPPED | OUTPATIENT
Start: 2025-04-09

## 2025-04-09 NOTE — TELEPHONE ENCOUNTER
No care due was identified.  Health [facility] Embedded Care Due Messages. Reference number: [id number].   4/09/2025 7:09:20 AM CDT

## 2025-04-09 NOTE — TELEPHONE ENCOUNTER
Refill Routing Note   Medication(s) are not appropriate for processing by [facility] for the following reason(s):        Required labs outdated    [facility] action(s):  Defer               Appointments  past 12m or future 3m with PCP    Date Provider   Last Visit   1/23/2024 Elvia Chacko MD   Next Visit   5/20/2025 Elvia Chacko MD   ED visits in past 90 days: 0        Note composed:1:03 PM 04/09/2025

## 2025-04-11 DIAGNOSIS — I10 HYPERTENSION, UNSPECIFIED TYPE: ICD-10-CM

## 2025-04-11 RX ORDER — ATENOLOL 25 MG/1
25 TABLET ORAL
Qty: 30 TABLET | Refills: 0 | Status: SHIPPED | OUTPATIENT
Start: 2025-04-11

## 2025-04-11 NOTE — TELEPHONE ENCOUNTER
No care due was identified.  [facility] Embedded Care Due Messages. Reference number: [id number].   4/11/2025 7:09:30 AM CDT

## 2025-04-11 NOTE — TELEPHONE ENCOUNTER
Refill Routing Note   Medication(s) are not appropriate for processing by [facility] for the following reason(s):        Required vitals outdated    [facility] action(s):  Defer             Appointments  past 12m or future 3m with PCP    Date Provider   Last Visit   1/23/2024 Elvia Chacko MD   Next Visit   5/20/2025 Elvia Chacko MD   ED visits in past 90 days: 0        Note composed:7:24 AM 04/11/2025

## 2025-04-16 ENCOUNTER — TELEPHONE (OUTPATIENT)
Dept: OBSTETRICS AND GYNECOLOGY | Facility: CLINIC | Age: 60
End: 2025-04-16
Payer: COMMERCIAL

## 2025-04-16 DIAGNOSIS — R92.8 ABNORMAL MAMMOGRAM OF RIGHT BREAST: Primary | ICD-10-CM

## 2025-04-17 ENCOUNTER — RESULTS FOLLOW-UP (OUTPATIENT)
Dept: OBSTETRICS AND GYNECOLOGY | Facility: CLINIC | Age: 60
End: 2025-04-17

## 2025-04-17 NOTE — TELEPHONE ENCOUNTER
I have not seen her result yet - it was just uploaded this AM. I will review and call her now. Thanks Joy

## 2025-04-17 NOTE — TELEPHONE ENCOUNTER
Called pt, confirmed identity. Discussed MMG result from [facility]. Birads-0, additional imaging recommended. Will send diagnostic MMG and US of right breast orders to [facility]. Instructed patient to call [facility] later this afternoon to schedule. Questions answered. Discussed with patient process of us obtaining [facility] reports via fax, etc. She voiced thanks and understanding.

## 2025-04-24 ENCOUNTER — RESULTS FOLLOW-UP (OUTPATIENT)
Dept: OBSTETRICS AND GYNECOLOGY | Facility: CLINIC | Age: 60
End: 2025-04-24

## 2025-05-07 DIAGNOSIS — E78.5 HYPERLIPIDEMIA, UNSPECIFIED HYPERLIPIDEMIA TYPE: ICD-10-CM

## 2025-05-07 RX ORDER — PRAVASTATIN SODIUM 20 MG/1
20 TABLET ORAL DAILY
Qty: 30 TABLET | Refills: 0 | Status: SHIPPED | OUTPATIENT
Start: 2025-05-07

## 2025-05-07 NOTE — TELEPHONE ENCOUNTER
No care due was identified.  [facility] Embedded Care Due Messages. Reference number: [id number].   5/07/2025 4:29:56 PM CDT

## 2025-05-08 ENCOUNTER — PATIENT MESSAGE (OUTPATIENT)
Dept: PRIMARY CARE CLINIC | Facility: CLINIC | Age: 60
End: 2025-05-08
Payer: COMMERCIAL

## 2025-05-13 DIAGNOSIS — I10 HYPERTENSION, UNSPECIFIED TYPE: ICD-10-CM

## 2025-05-13 RX ORDER — ATENOLOL 25 MG/1
25 TABLET ORAL
Qty: 30 TABLET | Refills: 0 | Status: SHIPPED | OUTPATIENT
Start: 2025-05-13

## 2025-05-13 NOTE — TELEPHONE ENCOUNTER
No care due was identified.  [facility] Embedded Care Due Messages. Reference number: [id number].   5/13/2025 7:10:33 AM CDT

## 2025-05-15 ENCOUNTER — PATIENT MESSAGE (OUTPATIENT)
Dept: PRIMARY CARE CLINIC | Facility: CLINIC | Age: 60
End: 2025-05-15
Payer: COMMERCIAL

## 2025-05-15 DIAGNOSIS — J45.991 COUGH VARIANT ASTHMA: ICD-10-CM

## 2025-05-16 RX ORDER — ALBUTEROL SULFATE 90 UG/1
2 INHALANT RESPIRATORY (INHALATION) EVERY 6 HOURS PRN
Qty: 18 G | Refills: 0 | Status: SHIPPED | OUTPATIENT
Start: 2025-05-16 | End: 2026-05-16

## 2025-05-16 NOTE — TELEPHONE ENCOUNTER
No care due was identified.  [facility] Embedded Care Due Messages. Reference number: [id number].   5/16/2025 12:09:17 PM CDT
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
## 2025-05-20 ENCOUNTER — OFFICE VISIT (OUTPATIENT)
Dept: PRIMARY CARE CLINIC | Facility: CLINIC | Age: 60
End: 2025-05-20
Payer: COMMERCIAL

## 2025-05-20 ENCOUNTER — PATIENT MESSAGE (OUTPATIENT)
Dept: HEPATOLOGY | Facility: CLINIC | Age: 60
End: 2025-05-20
Payer: COMMERCIAL

## 2025-05-20 ENCOUNTER — RESULTS FOLLOW-UP (OUTPATIENT)
Dept: PRIMARY CARE CLINIC | Facility: CLINIC | Age: 60
End: 2025-05-20

## 2025-05-20 ENCOUNTER — LAB VISIT (OUTPATIENT)
Dept: LAB | Facility: HOSPITAL | Age: 60
End: 2025-05-20
Attending: INTERNAL MEDICINE
Payer: COMMERCIAL

## 2025-05-20 VITALS
RESPIRATION RATE: 17 BRPM | HEART RATE: 81 BPM | TEMPERATURE: 98 F | OXYGEN SATURATION: 97 % | DIASTOLIC BLOOD PRESSURE: 84 MMHG | BODY MASS INDEX: 27.95 KG/M2 | SYSTOLIC BLOOD PRESSURE: 130 MMHG | WEIGHT: 151.88 LBS | HEIGHT: 62 IN

## 2025-05-20 DIAGNOSIS — J45.991 COUGH VARIANT ASTHMA: ICD-10-CM

## 2025-05-20 DIAGNOSIS — M54.2 CHRONIC NECK PAIN: ICD-10-CM

## 2025-05-20 DIAGNOSIS — E78.5 HYPERLIPIDEMIA, UNSPECIFIED HYPERLIPIDEMIA TYPE: ICD-10-CM

## 2025-05-20 DIAGNOSIS — G89.29 CHRONIC NECK PAIN: ICD-10-CM

## 2025-05-20 DIAGNOSIS — Z00.00 NORMAL PHYSICAL EXAM, ROUTINE: Primary | ICD-10-CM

## 2025-05-20 DIAGNOSIS — I10 HYPERTENSION, UNSPECIFIED TYPE: ICD-10-CM

## 2025-05-20 DIAGNOSIS — K76.89 LIVER CYST: ICD-10-CM

## 2025-05-20 DIAGNOSIS — K76.0 FATTY LIVER: ICD-10-CM

## 2025-05-20 DIAGNOSIS — Z00.00 NORMAL PHYSICAL EXAM, ROUTINE: ICD-10-CM

## 2025-05-20 LAB
ABSOLUTE EOSINOPHIL (OHS): 0.08 K/UL
ABSOLUTE MONOCYTE (OHS): 0.47 K/UL (ref 0.3–1)
ABSOLUTE NEUTROPHIL COUNT (OHS): 3.59 K/UL (ref 1.8–7.7)
ALBUMIN SERPL BCP-MCNC: 3.5 G/DL (ref 3.5–5.2)
ALP SERPL-CCNC: 98 UNIT/L (ref 40–150)
ALT SERPL W/O P-5'-P-CCNC: 17 UNIT/L (ref 10–44)
ANION GAP (OHS): 11 MMOL/L (ref 8–16)
AST SERPL-CCNC: 19 UNIT/L (ref 11–45)
BASOPHILS # BLD AUTO: 0.07 K/UL
BASOPHILS NFR BLD AUTO: 1.2 %
BILIRUB SERPL-MCNC: 0.5 MG/DL (ref 0.1–1)
BUN SERPL-MCNC: 13 MG/DL (ref 6–20)
CALCIUM SERPL-MCNC: 8.6 MG/DL (ref 8.7–10.5)
CHLORIDE SERPL-SCNC: 104 MMOL/L (ref 95–110)
CHOLEST SERPL-MCNC: 171 MG/DL (ref 120–199)
CHOLEST/HDLC SERPL: 3.8 {RATIO} (ref 2–5)
CO2 SERPL-SCNC: 25 MMOL/L (ref 23–29)
CREAT SERPL-MCNC: 0.9 MG/DL (ref 0.5–1.4)
EAG (OHS): 111 MG/DL (ref 68–131)
ERYTHROCYTE [DISTWIDTH] IN BLOOD BY AUTOMATED COUNT: 12.1 % (ref 11.5–14.5)
GFR SERPLBLD CREATININE-BSD FMLA CKD-EPI: >60 ML/MIN/1.73/M2
GLUCOSE SERPL-MCNC: 94 MG/DL (ref 70–110)
HBA1C MFR BLD: 5.5 % (ref 4–5.6)
HCT VFR BLD AUTO: 42.2 % (ref 37–48.5)
HDLC SERPL-MCNC: 45 MG/DL (ref 40–75)
HDLC SERPL: 26.3 % (ref 20–50)
HGB BLD-MCNC: 13.8 GM/DL (ref 12–16)
IMM GRANULOCYTES # BLD AUTO: 0.02 K/UL (ref 0–0.04)
IMM GRANULOCYTES NFR BLD AUTO: 0.3 % (ref 0–0.5)
LDLC SERPL CALC-MCNC: 99.2 MG/DL (ref 63–159)
LYMPHOCYTES # BLD AUTO: 1.83 K/UL (ref 1–4.8)
MCH RBC QN AUTO: 31.7 PG (ref 27–31)
MCHC RBC AUTO-ENTMCNC: 32.7 G/DL (ref 32–36)
MCV RBC AUTO: 97 FL (ref 82–98)
NONHDLC SERPL-MCNC: 126 MG/DL
NUCLEATED RBC (/100WBC) (OHS): 0 /100 WBC
PLATELET # BLD AUTO: 301 K/UL (ref 150–450)
PMV BLD AUTO: 11.3 FL (ref 9.2–12.9)
POTASSIUM SERPL-SCNC: 4.5 MMOL/L (ref 3.5–5.1)
PROT SERPL-MCNC: 6.8 GM/DL (ref 6–8.4)
RBC # BLD AUTO: 4.36 M/UL (ref 4–5.4)
RELATIVE EOSINOPHIL (OHS): 1.3 %
RELATIVE LYMPHOCYTE (OHS): 30.2 % (ref 18–48)
RELATIVE MONOCYTE (OHS): 7.8 % (ref 4–15)
RELATIVE NEUTROPHIL (OHS): 59.2 % (ref 38–73)
SODIUM SERPL-SCNC: 140 MMOL/L (ref 136–145)
TRIGL SERPL-MCNC: 134 MG/DL (ref 30–150)
TSH SERPL-ACNC: 1.18 UIU/ML (ref 0.4–4)
WBC # BLD AUTO: 6.06 K/UL (ref 3.9–12.7)

## 2025-05-20 PROCEDURE — 99999 PR PBB SHADOW E&M-EST. PATIENT-LVL V: CPT | Mod: PBBFAC,,, | Performed by: INTERNAL MEDICINE

## 2025-05-20 PROCEDURE — 83036 HEMOGLOBIN GLYCOSYLATED A1C: CPT

## 2025-05-20 PROCEDURE — 84443 ASSAY THYROID STIM HORMONE: CPT

## 2025-05-20 PROCEDURE — 80061 LIPID PANEL: CPT

## 2025-05-20 PROCEDURE — 1159F MED LIST DOCD IN RCRD: CPT | Mod: CPTII,S$GLB,, | Performed by: INTERNAL MEDICINE

## 2025-05-20 PROCEDURE — 3079F DIAST BP 80-89 MM HG: CPT | Mod: CPTII,S$GLB,, | Performed by: INTERNAL MEDICINE

## 2025-05-20 PROCEDURE — 3044F HG A1C LEVEL LT 7.0%: CPT | Mod: CPTII,S$GLB,, | Performed by: INTERNAL MEDICINE

## 2025-05-20 PROCEDURE — 99396 PREV VISIT EST AGE 40-64: CPT | Mod: S$GLB,,, | Performed by: INTERNAL MEDICINE

## 2025-05-20 PROCEDURE — 80053 COMPREHEN METABOLIC PANEL: CPT

## 2025-05-20 PROCEDURE — 1160F RVW MEDS BY RX/DR IN RCRD: CPT | Mod: CPTII,S$GLB,, | Performed by: INTERNAL MEDICINE

## 2025-05-20 PROCEDURE — 85025 COMPLETE CBC W/AUTO DIFF WBC: CPT

## 2025-05-20 PROCEDURE — 36415 COLL VENOUS BLD VENIPUNCTURE: CPT | Mod: PN

## 2025-05-20 PROCEDURE — 3008F BODY MASS INDEX DOCD: CPT | Mod: CPTII,S$GLB,, | Performed by: INTERNAL MEDICINE

## 2025-05-20 PROCEDURE — 3075F SYST BP GE 130 - 139MM HG: CPT | Mod: CPTII,S$GLB,, | Performed by: INTERNAL MEDICINE

## 2025-05-20 PROCEDURE — 4010F ACE/ARB THERAPY RXD/TAKEN: CPT | Mod: CPTII,S$GLB,, | Performed by: INTERNAL MEDICINE

## 2025-05-20 RX ORDER — ALBUTEROL SULFATE 90 UG/1
2 INHALANT RESPIRATORY (INHALATION) EVERY 6 HOURS PRN
Qty: 18 G | Refills: 3 | Status: SHIPPED | OUTPATIENT
Start: 2025-05-20 | End: 2026-05-20

## 2025-05-20 RX ORDER — FLUTICASONE PROPIONATE AND SALMETEROL 250; 50 UG/1; UG/1
1 POWDER RESPIRATORY (INHALATION) 2 TIMES DAILY
Qty: 60 EACH | Refills: 3 | Status: SHIPPED | OUTPATIENT
Start: 2025-05-20 | End: 2026-05-20

## 2025-05-20 RX ORDER — PRAVASTATIN SODIUM 20 MG/1
20 TABLET ORAL DAILY
Qty: 90 TABLET | Refills: 1 | Status: SHIPPED | OUTPATIENT
Start: 2025-05-20

## 2025-05-20 RX ORDER — ATENOLOL 25 MG/1
25 TABLET ORAL DAILY
Qty: 90 TABLET | Refills: 3 | Status: SHIPPED | OUTPATIENT
Start: 2025-05-20

## 2025-05-20 RX ORDER — LOSARTAN POTASSIUM 25 MG/1
25 TABLET ORAL DAILY
Qty: 90 TABLET | Refills: 3 | Status: SHIPPED | OUTPATIENT
Start: 2025-05-20

## 2025-05-21 ENCOUNTER — TELEPHONE (OUTPATIENT)
Dept: ADMINISTRATIVE | Facility: OTHER | Age: 60
End: 2025-05-21
Payer: COMMERCIAL

## 2025-05-21 NOTE — PROGRESS NOTES
I sent pt a my chart message -  I reviewed your labs.  Your Ha1c was normal at 5.5. Your thyroid functions are normal.  Your Cholesterol looked good.  Your kidney function and liver functions looked good.  Your corrected calcium was normal. You are not anemic. No further recommendations at this time.  Dr. CHAMPION

## 2025-08-21 ENCOUNTER — OFFICE VISIT (OUTPATIENT)
Dept: HEPATOLOGY | Facility: CLINIC | Age: 60
End: 2025-08-21
Payer: COMMERCIAL

## 2025-08-21 VITALS
SYSTOLIC BLOOD PRESSURE: 134 MMHG | HEART RATE: 72 BPM | WEIGHT: 150.38 LBS | HEIGHT: 62 IN | TEMPERATURE: 98 F | RESPIRATION RATE: 18 BRPM | DIASTOLIC BLOOD PRESSURE: 63 MMHG | OXYGEN SATURATION: 96 % | BODY MASS INDEX: 27.67 KG/M2

## 2025-08-21 DIAGNOSIS — K76.0 HEPATIC STEATOSIS: ICD-10-CM

## 2025-08-21 DIAGNOSIS — K76.9 LIVER LESION: Primary | ICD-10-CM

## 2025-08-21 PROCEDURE — 1159F MED LIST DOCD IN RCRD: CPT | Mod: CPTII,S$GLB,, | Performed by: STUDENT IN AN ORGANIZED HEALTH CARE EDUCATION/TRAINING PROGRAM

## 2025-08-21 PROCEDURE — 3075F SYST BP GE 130 - 139MM HG: CPT | Mod: CPTII,S$GLB,, | Performed by: STUDENT IN AN ORGANIZED HEALTH CARE EDUCATION/TRAINING PROGRAM

## 2025-08-21 PROCEDURE — 3008F BODY MASS INDEX DOCD: CPT | Mod: CPTII,S$GLB,, | Performed by: STUDENT IN AN ORGANIZED HEALTH CARE EDUCATION/TRAINING PROGRAM

## 2025-08-21 PROCEDURE — 3044F HG A1C LEVEL LT 7.0%: CPT | Mod: CPTII,S$GLB,, | Performed by: STUDENT IN AN ORGANIZED HEALTH CARE EDUCATION/TRAINING PROGRAM

## 2025-08-21 PROCEDURE — 4010F ACE/ARB THERAPY RXD/TAKEN: CPT | Mod: CPTII,S$GLB,, | Performed by: STUDENT IN AN ORGANIZED HEALTH CARE EDUCATION/TRAINING PROGRAM

## 2025-08-21 PROCEDURE — 99999 PR PBB SHADOW E&M-EST. PATIENT-LVL IV: CPT | Mod: PBBFAC,,, | Performed by: STUDENT IN AN ORGANIZED HEALTH CARE EDUCATION/TRAINING PROGRAM

## 2025-08-21 PROCEDURE — 99213 OFFICE O/P EST LOW 20 MIN: CPT | Mod: S$GLB,,, | Performed by: STUDENT IN AN ORGANIZED HEALTH CARE EDUCATION/TRAINING PROGRAM

## 2025-08-21 PROCEDURE — 1160F RVW MEDS BY RX/DR IN RCRD: CPT | Mod: CPTII,S$GLB,, | Performed by: STUDENT IN AN ORGANIZED HEALTH CARE EDUCATION/TRAINING PROGRAM

## 2025-08-21 PROCEDURE — 3078F DIAST BP <80 MM HG: CPT | Mod: CPTII,S$GLB,, | Performed by: STUDENT IN AN ORGANIZED HEALTH CARE EDUCATION/TRAINING PROGRAM
